# Patient Record
Sex: MALE | Race: WHITE | Employment: OTHER | RURAL
[De-identification: names, ages, dates, MRNs, and addresses within clinical notes are randomized per-mention and may not be internally consistent; named-entity substitution may affect disease eponyms.]

---

## 2017-01-17 ENCOUNTER — TELEPHONE (OUTPATIENT)
Dept: PRIMARY CARE CLINIC | Age: 62
End: 2017-01-17

## 2017-01-17 ENCOUNTER — HOSPITAL ENCOUNTER (OUTPATIENT)
Dept: GENERAL RADIOLOGY | Age: 62
Discharge: HOME | End: 2017-01-17
Attending: NURSE PRACTITIONER | Admitting: NURSE PRACTITIONER

## 2017-01-17 ENCOUNTER — OFFICE VISIT (OUTPATIENT)
Dept: PRIMARY CARE CLINIC | Age: 62
End: 2017-01-17
Payer: COMMERCIAL

## 2017-01-17 VITALS
HEART RATE: 85 BPM | BODY MASS INDEX: 47.47 KG/M2 | OXYGEN SATURATION: 96 % | WEIGHT: 315 LBS | DIASTOLIC BLOOD PRESSURE: 80 MMHG | SYSTOLIC BLOOD PRESSURE: 130 MMHG

## 2017-01-17 DIAGNOSIS — M25.562 PAIN IN LEFT KNEE: ICD-10-CM

## 2017-01-17 DIAGNOSIS — J06.9 UPPER RESPIRATORY TRACT INFECTION, UNSPECIFIED TYPE: ICD-10-CM

## 2017-01-17 DIAGNOSIS — M25.462 SWELLING OF LEFT KNEE JOINT: ICD-10-CM

## 2017-01-17 DIAGNOSIS — F41.9 ANXIETY: ICD-10-CM

## 2017-01-17 DIAGNOSIS — M25.562 LEFT KNEE PAIN, UNSPECIFIED CHRONICITY: ICD-10-CM

## 2017-01-17 DIAGNOSIS — M25.562 LEFT KNEE PAIN, UNSPECIFIED CHRONICITY: Primary | ICD-10-CM

## 2017-01-17 PROCEDURE — 99213 OFFICE O/P EST LOW 20 MIN: CPT | Performed by: NURSE PRACTITIONER

## 2017-01-17 RX ORDER — AZITHROMYCIN 250 MG/1
TABLET, FILM COATED ORAL
Qty: 1 PACKET | Refills: 0 | Status: SHIPPED | OUTPATIENT
Start: 2017-01-17 | End: 2017-01-24 | Stop reason: ALTCHOICE

## 2017-01-17 RX ORDER — ALPRAZOLAM 1 MG/1
1 TABLET ORAL 3 TIMES DAILY PRN
Qty: 90 TABLET | Refills: 0 | Status: SHIPPED | OUTPATIENT
Start: 2017-01-17 | End: 2017-02-17 | Stop reason: SDUPTHER

## 2017-01-17 ASSESSMENT — ENCOUNTER SYMPTOMS
GASTROINTESTINAL NEGATIVE: 1
EYES NEGATIVE: 1

## 2017-01-19 ENCOUNTER — TELEPHONE (OUTPATIENT)
Dept: PRIMARY CARE CLINIC | Age: 62
End: 2017-01-19

## 2017-01-20 ENCOUNTER — TELEPHONE (OUTPATIENT)
Dept: PRIMARY CARE CLINIC | Age: 62
End: 2017-01-20

## 2017-01-24 ENCOUNTER — OFFICE VISIT (OUTPATIENT)
Dept: PRIMARY CARE CLINIC | Age: 62
End: 2017-01-24
Payer: COMMERCIAL

## 2017-01-24 VITALS
HEIGHT: 72 IN | DIASTOLIC BLOOD PRESSURE: 80 MMHG | SYSTOLIC BLOOD PRESSURE: 142 MMHG | WEIGHT: 315 LBS | HEART RATE: 79 BPM | RESPIRATION RATE: 20 BRPM | BODY MASS INDEX: 42.66 KG/M2 | OXYGEN SATURATION: 97 %

## 2017-01-24 DIAGNOSIS — N28.9 RENAL INSUFFICIENCY: ICD-10-CM

## 2017-01-24 DIAGNOSIS — E11.9 TYPE 2 DIABETES MELLITUS WITHOUT COMPLICATION, WITH LONG-TERM CURRENT USE OF INSULIN (HCC): ICD-10-CM

## 2017-01-24 DIAGNOSIS — I10 ESSENTIAL HYPERTENSION: ICD-10-CM

## 2017-01-24 DIAGNOSIS — M25.562 LEFT KNEE PAIN, UNSPECIFIED CHRONICITY: Primary | ICD-10-CM

## 2017-01-24 DIAGNOSIS — Z79.4 TYPE 2 DIABETES MELLITUS WITHOUT COMPLICATION, WITH LONG-TERM CURRENT USE OF INSULIN (HCC): ICD-10-CM

## 2017-01-24 PROCEDURE — 99213 OFFICE O/P EST LOW 20 MIN: CPT | Performed by: NURSE PRACTITIONER

## 2017-01-24 RX ORDER — AMLODIPINE BESYLATE 5 MG/1
TABLET ORAL
Qty: 60 TABLET | Refills: 5 | Status: SHIPPED | OUTPATIENT
Start: 2017-01-24 | End: 2017-01-26 | Stop reason: SDUPTHER

## 2017-01-24 RX ORDER — GABAPENTIN 800 MG/1
TABLET ORAL
Refills: 0 | COMMUNITY
Start: 2016-12-30 | End: 2017-09-08 | Stop reason: ALTCHOICE

## 2017-01-24 ASSESSMENT — ENCOUNTER SYMPTOMS
ABDOMINAL PAIN: 0
VOMITING: 0
NAUSEA: 0
WHEEZING: 0
BACK PAIN: 0
SINUS PRESSURE: 0
EYE DISCHARGE: 0
COUGH: 0
SHORTNESS OF BREATH: 0

## 2017-01-26 RX ORDER — AMLODIPINE BESYLATE 10 MG/1
TABLET ORAL
Qty: 30 TABLET | Refills: 5 | Status: SHIPPED | OUTPATIENT
Start: 2017-01-26 | End: 2017-06-09 | Stop reason: SDUPTHER

## 2017-01-30 ASSESSMENT — ENCOUNTER SYMPTOMS
EYE PAIN: 0
COUGH: 1
ABDOMINAL PAIN: 0
SHORTNESS OF BREATH: 0
SORE THROAT: 0
NAUSEA: 0
VOMITING: 0
SINUS PRESSURE: 1

## 2017-02-03 RX ORDER — OMEPRAZOLE 40 MG/1
CAPSULE, DELAYED RELEASE ORAL
Qty: 30 CAPSULE | Refills: 5 | Status: SHIPPED | OUTPATIENT
Start: 2017-02-03 | End: 2017-11-02 | Stop reason: SDUPTHER

## 2017-02-17 RX ORDER — ALPRAZOLAM 1 MG/1
1 TABLET ORAL 3 TIMES DAILY PRN
Qty: 90 TABLET | Refills: 0 | Status: SHIPPED | OUTPATIENT
Start: 2017-02-17 | End: 2017-03-17 | Stop reason: SDUPTHER

## 2017-02-23 ENCOUNTER — TELEPHONE (OUTPATIENT)
Dept: PRIMARY CARE CLINIC | Age: 62
End: 2017-02-23

## 2017-03-02 DIAGNOSIS — E03.9 HYPOTHYROIDISM: ICD-10-CM

## 2017-03-02 RX ORDER — LEVOTHYROXINE SODIUM 0.07 MG/1
TABLET ORAL
Qty: 30 TABLET | Refills: 5 | Status: SHIPPED | OUTPATIENT
Start: 2017-03-02 | End: 2017-11-02 | Stop reason: SDUPTHER

## 2017-03-09 ENCOUNTER — HOSPITAL ENCOUNTER (OUTPATIENT)
Dept: OTHER | Age: 62
Discharge: HOME | End: 2017-03-09
Attending: NURSE PRACTITIONER | Admitting: NURSE PRACTITIONER

## 2017-03-09 ENCOUNTER — OFFICE VISIT (OUTPATIENT)
Dept: PRIMARY CARE CLINIC | Age: 62
End: 2017-03-09
Payer: COMMERCIAL

## 2017-03-09 VITALS
DIASTOLIC BLOOD PRESSURE: 80 MMHG | WEIGHT: 315 LBS | BODY MASS INDEX: 47.5 KG/M2 | SYSTOLIC BLOOD PRESSURE: 140 MMHG | OXYGEN SATURATION: 96 % | HEART RATE: 94 BPM

## 2017-03-09 DIAGNOSIS — M10.9 GOUT, UNSPECIFIED CAUSE, UNSPECIFIED CHRONICITY, UNSPECIFIED SITE: ICD-10-CM

## 2017-03-09 DIAGNOSIS — Z79.4 TYPE 2 DIABETES MELLITUS WITHOUT COMPLICATION, WITH LONG-TERM CURRENT USE OF INSULIN (HCC): ICD-10-CM

## 2017-03-09 DIAGNOSIS — E11.9 TYPE 2 DIABETES MELLITUS WITHOUT COMPLICATION, WITH LONG-TERM CURRENT USE OF INSULIN (HCC): ICD-10-CM

## 2017-03-09 DIAGNOSIS — M25.562 LEFT KNEE PAIN, UNSPECIFIED CHRONICITY: ICD-10-CM

## 2017-03-09 DIAGNOSIS — M10.9 GOUT, UNSPECIFIED CAUSE, UNSPECIFIED CHRONICITY, UNSPECIFIED SITE: Primary | ICD-10-CM

## 2017-03-09 DIAGNOSIS — I10 ESSENTIAL HYPERTENSION: ICD-10-CM

## 2017-03-09 LAB
A/G RATIO: 2 (ref 0.8–2)
ALBUMIN SERPL-MCNC: 4.5 G/DL (ref 3.4–4.8)
ALP BLD-CCNC: 120 U/L (ref 25–100)
ALT SERPL-CCNC: 19 U/L (ref 4–36)
ANION GAP SERPL CALCULATED.3IONS-SCNC: 11 MMOL/L (ref 3–16)
AST SERPL-CCNC: 17 U/L (ref 8–33)
BILIRUB SERPL-MCNC: 0.3 MG/DL (ref 0.3–1.2)
BUN BLDV-MCNC: 16 MG/DL (ref 6–20)
CALCIUM SERPL-MCNC: 9.3 MG/DL (ref 8.5–10.5)
CHLORIDE BLD-SCNC: 102 MMOL/L (ref 98–107)
CO2: 26 MMOL/L (ref 20–30)
CREAT SERPL-MCNC: 1.3 MG/DL (ref 0.4–1.2)
GFR AFRICAN AMERICAN: >59
GFR NON-AFRICAN AMERICAN: 56
GLOBULIN: 2.3 G/DL
GLUCOSE BLD-MCNC: 155 MG/DL (ref 74–106)
HBA1C MFR BLD: 8.5 %
POTASSIUM SERPL-SCNC: 4.6 MMOL/L (ref 3.4–5.1)
SODIUM BLD-SCNC: 139 MMOL/L (ref 136–145)
TOTAL PROTEIN: 6.8 G/DL (ref 6.4–8.3)
URIC ACID, SERUM: 5.5 MG/DL (ref 3.7–8.6)

## 2017-03-09 PROCEDURE — 99213 OFFICE O/P EST LOW 20 MIN: CPT | Performed by: NURSE PRACTITIONER

## 2017-03-09 RX ORDER — ALLOPURINOL 300 MG/1
300 TABLET ORAL DAILY
Qty: 30 TABLET | Refills: 5 | Status: CANCELLED | OUTPATIENT
Start: 2017-03-09 | End: 2018-03-09

## 2017-03-09 RX ORDER — LISINOPRIL 40 MG/1
40 TABLET ORAL DAILY
Qty: 30 TABLET | Refills: 5 | Status: SHIPPED | OUTPATIENT
Start: 2017-03-09 | End: 2017-11-02 | Stop reason: SDUPTHER

## 2017-03-09 ASSESSMENT — ENCOUNTER SYMPTOMS
RESPIRATORY NEGATIVE: 1
COUGH: 0
VOMITING: 0
GASTROINTESTINAL NEGATIVE: 1
SHORTNESS OF BREATH: 0
ABDOMINAL PAIN: 0
SORE THROAT: 0
NAUSEA: 0
EYES NEGATIVE: 1
EYE PAIN: 0

## 2017-03-10 ENCOUNTER — TELEPHONE (OUTPATIENT)
Dept: PRIMARY CARE CLINIC | Age: 62
End: 2017-03-10

## 2017-03-10 RX ORDER — ALLOPURINOL 100 MG/1
100 TABLET ORAL DAILY
Qty: 30 TABLET | Refills: 5 | Status: SHIPPED | OUTPATIENT
Start: 2017-03-10 | End: 2017-11-02 | Stop reason: SDUPTHER

## 2017-03-17 RX ORDER — ALPRAZOLAM 1 MG/1
1 TABLET ORAL 3 TIMES DAILY PRN
Qty: 90 TABLET | Refills: 0 | Status: SHIPPED | OUTPATIENT
Start: 2017-03-17 | End: 2017-04-18 | Stop reason: SDUPTHER

## 2017-04-01 ENCOUNTER — OFFICE VISIT (OUTPATIENT)
Dept: PRIMARY CARE CLINIC | Age: 62
End: 2017-04-01
Payer: COMMERCIAL

## 2017-04-01 VITALS
BODY MASS INDEX: 47.47 KG/M2 | WEIGHT: 315 LBS | SYSTOLIC BLOOD PRESSURE: 110 MMHG | HEART RATE: 88 BPM | OXYGEN SATURATION: 97 % | DIASTOLIC BLOOD PRESSURE: 80 MMHG | TEMPERATURE: 97.6 F

## 2017-04-01 DIAGNOSIS — R50.81 FEVER IN OTHER DISEASES: ICD-10-CM

## 2017-04-01 DIAGNOSIS — R05.9 COUGH: Primary | ICD-10-CM

## 2017-04-01 DIAGNOSIS — R52 BODY ACHES: ICD-10-CM

## 2017-04-01 DIAGNOSIS — J06.9 UPPER RESPIRATORY TRACT INFECTION, UNSPECIFIED TYPE: ICD-10-CM

## 2017-04-01 LAB
INFLUENZA A ANTIBODY: NORMAL
INFLUENZA B ANTIBODY: NORMAL

## 2017-04-01 PROCEDURE — 99213 OFFICE O/P EST LOW 20 MIN: CPT | Performed by: NURSE PRACTITIONER

## 2017-04-01 PROCEDURE — 87804 INFLUENZA ASSAY W/OPTIC: CPT | Performed by: NURSE PRACTITIONER

## 2017-04-01 RX ORDER — AZITHROMYCIN 250 MG/1
TABLET, FILM COATED ORAL
Qty: 1 PACKET | Refills: 0 | Status: SHIPPED | OUTPATIENT
Start: 2017-04-01 | End: 2017-05-16 | Stop reason: ALTCHOICE

## 2017-04-01 RX ORDER — BROMPHENIRAMINE MALEATE, PSEUDOEPHEDRINE HYDROCHLORIDE, AND DEXTROMETHORPHAN HYDROBROMIDE 2; 30; 10 MG/5ML; MG/5ML; MG/5ML
SYRUP ORAL
Qty: 120 ML | Refills: 1 | Status: SHIPPED | OUTPATIENT
Start: 2017-04-01 | End: 2017-04-01 | Stop reason: CLARIF

## 2017-04-01 RX ORDER — BROMPHENIRAMINE MALEATE, PSEUDOEPHEDRINE HYDROCHLORIDE, AND DEXTROMETHORPHAN HYDROBROMIDE 2; 30; 10 MG/5ML; MG/5ML; MG/5ML
SYRUP ORAL
Qty: 120 ML | Refills: 1 | Status: SHIPPED | OUTPATIENT
Start: 2017-04-01 | End: 2017-06-09 | Stop reason: ALTCHOICE

## 2017-04-01 RX ORDER — AZITHROMYCIN 250 MG/1
TABLET, FILM COATED ORAL
Qty: 1 PACKET | Refills: 0 | Status: SHIPPED | OUTPATIENT
Start: 2017-04-01 | End: 2017-04-01 | Stop reason: CLARIF

## 2017-04-01 ASSESSMENT — ENCOUNTER SYMPTOMS
EYES NEGATIVE: 1
COUGH: 1
GASTROINTESTINAL NEGATIVE: 1

## 2017-04-18 RX ORDER — ALPRAZOLAM 1 MG/1
1 TABLET ORAL 3 TIMES DAILY PRN
Qty: 90 TABLET | Refills: 0 | Status: SHIPPED | OUTPATIENT
Start: 2017-04-18 | End: 2017-05-18 | Stop reason: SDUPTHER

## 2017-05-18 RX ORDER — ALPRAZOLAM 1 MG/1
1 TABLET ORAL 3 TIMES DAILY PRN
Qty: 90 TABLET | Refills: 0 | Status: SHIPPED | OUTPATIENT
Start: 2017-05-18 | End: 2017-06-13 | Stop reason: SDUPTHER

## 2017-06-09 ENCOUNTER — OFFICE VISIT (OUTPATIENT)
Dept: PRIMARY CARE CLINIC | Age: 62
End: 2017-06-09
Payer: COMMERCIAL

## 2017-06-09 ENCOUNTER — HOSPITAL ENCOUNTER (OUTPATIENT)
Dept: OTHER | Age: 62
Discharge: HOME | End: 2017-06-09
Attending: NURSE PRACTITIONER | Admitting: NURSE PRACTITIONER

## 2017-06-09 VITALS
OXYGEN SATURATION: 97 % | DIASTOLIC BLOOD PRESSURE: 70 MMHG | BODY MASS INDEX: 45.62 KG/M2 | WEIGHT: 315 LBS | SYSTOLIC BLOOD PRESSURE: 120 MMHG | HEART RATE: 89 BPM

## 2017-06-09 DIAGNOSIS — Z12.11 SCREENING FOR COLON CANCER: ICD-10-CM

## 2017-06-09 DIAGNOSIS — I10 ESSENTIAL HYPERTENSION: ICD-10-CM

## 2017-06-09 DIAGNOSIS — E11.9 TYPE 2 DIABETES MELLITUS WITHOUT COMPLICATION, WITH LONG-TERM CURRENT USE OF INSULIN (HCC): ICD-10-CM

## 2017-06-09 DIAGNOSIS — E78.00 HYPERCHOLESTEREMIA: ICD-10-CM

## 2017-06-09 DIAGNOSIS — Z79.4 TYPE 2 DIABETES MELLITUS WITHOUT COMPLICATION, WITH LONG-TERM CURRENT USE OF INSULIN (HCC): ICD-10-CM

## 2017-06-09 DIAGNOSIS — I10 ESSENTIAL HYPERTENSION: Primary | ICD-10-CM

## 2017-06-09 DIAGNOSIS — F41.9 ANXIETY: ICD-10-CM

## 2017-06-09 LAB
A/G RATIO: 1.9 (ref 0.8–2)
ALBUMIN SERPL-MCNC: 4.5 G/DL (ref 3.4–4.8)
ALP BLD-CCNC: 105 U/L (ref 25–100)
ALT SERPL-CCNC: 24 U/L (ref 4–36)
ANION GAP SERPL CALCULATED.3IONS-SCNC: 15 MMOL/L (ref 3–16)
AST SERPL-CCNC: 19 U/L (ref 8–33)
BILIRUB SERPL-MCNC: 0.3 MG/DL (ref 0.3–1.2)
BUN BLDV-MCNC: 15 MG/DL (ref 6–20)
CALCIUM SERPL-MCNC: 9.4 MG/DL (ref 8.5–10.5)
CHLORIDE BLD-SCNC: 103 MMOL/L (ref 98–107)
CHOLESTEROL, TOTAL: 160 MG/DL (ref 0–200)
CO2: 23 MMOL/L (ref 20–30)
CREAT SERPL-MCNC: 1.2 MG/DL (ref 0.4–1.2)
GFR AFRICAN AMERICAN: >59
GFR NON-AFRICAN AMERICAN: >59
GLOBULIN: 2.4 G/DL
GLUCOSE BLD-MCNC: 168 MG/DL (ref 74–106)
HBA1C MFR BLD: 7.7 %
HDLC SERPL-MCNC: 33 MG/DL (ref 40–60)
LDL CHOLESTEROL CALCULATED: 101 MG/DL
POTASSIUM SERPL-SCNC: 4.7 MMOL/L (ref 3.4–5.1)
SODIUM BLD-SCNC: 141 MMOL/L (ref 136–145)
TOTAL PROTEIN: 6.9 G/DL (ref 6.4–8.3)
TRIGL SERPL-MCNC: 129 MG/DL (ref 0–249)
VLDLC SERPL CALC-MCNC: 26 MG/DL

## 2017-06-09 PROCEDURE — 99213 OFFICE O/P EST LOW 20 MIN: CPT | Performed by: NURSE PRACTITIONER

## 2017-06-09 RX ORDER — AMLODIPINE BESYLATE 10 MG/1
TABLET ORAL
Qty: 30 TABLET | Refills: 5 | Status: SHIPPED | OUTPATIENT
Start: 2017-06-09 | End: 2017-12-05 | Stop reason: SDUPTHER

## 2017-06-09 RX ORDER — PRAVASTATIN SODIUM 40 MG
TABLET ORAL
Qty: 30 TABLET | Refills: 5 | Status: SHIPPED | OUTPATIENT
Start: 2017-06-09 | End: 2017-12-05 | Stop reason: SDUPTHER

## 2017-06-09 RX ORDER — GLIPIZIDE 10 MG/1
TABLET ORAL
Qty: 120 TABLET | Refills: 5 | Status: SHIPPED | OUTPATIENT
Start: 2017-06-09 | End: 2017-12-05 | Stop reason: SDUPTHER

## 2017-06-09 ASSESSMENT — PATIENT HEALTH QUESTIONNAIRE - PHQ9
2. FEELING DOWN, DEPRESSED OR HOPELESS: 0
SUM OF ALL RESPONSES TO PHQ9 QUESTIONS 1 & 2: 0
1. LITTLE INTEREST OR PLEASURE IN DOING THINGS: 0
SUM OF ALL RESPONSES TO PHQ QUESTIONS 1-9: 0

## 2017-06-09 ASSESSMENT — ENCOUNTER SYMPTOMS
EYE PAIN: 0
VOMITING: 0
ABDOMINAL PAIN: 0
SORE THROAT: 0
NAUSEA: 0
COUGH: 0
SHORTNESS OF BREATH: 0

## 2017-06-13 ENCOUNTER — TELEPHONE (OUTPATIENT)
Dept: PRIMARY CARE CLINIC | Age: 62
End: 2017-06-13

## 2017-06-13 RX ORDER — ALPRAZOLAM 1 MG/1
1 TABLET ORAL 3 TIMES DAILY PRN
Qty: 90 TABLET | Refills: 0 | Status: SHIPPED | OUTPATIENT
Start: 2017-06-13 | End: 2017-07-18 | Stop reason: SDUPTHER

## 2017-06-13 RX ORDER — INSULIN GLARGINE 100 [IU]/ML
60 INJECTION, SOLUTION SUBCUTANEOUS NIGHTLY
Qty: 10 ML | Refills: 5
Start: 2017-06-13 | End: 2017-12-05 | Stop reason: SDUPTHER

## 2017-06-25 ASSESSMENT — ENCOUNTER SYMPTOMS: BACK PAIN: 1

## 2017-06-28 ENCOUNTER — OFFICE VISIT (OUTPATIENT)
Dept: PRIMARY CARE CLINIC | Age: 62
End: 2017-06-28
Payer: COMMERCIAL

## 2017-06-28 VITALS
OXYGEN SATURATION: 94 % | TEMPERATURE: 100.7 F | DIASTOLIC BLOOD PRESSURE: 80 MMHG | HEART RATE: 114 BPM | SYSTOLIC BLOOD PRESSURE: 140 MMHG

## 2017-06-28 DIAGNOSIS — R53.1 WEAKNESS: ICD-10-CM

## 2017-06-28 DIAGNOSIS — R50.9 FEVER, UNSPECIFIED FEVER CAUSE: ICD-10-CM

## 2017-06-28 DIAGNOSIS — R41.0 CONFUSION: Primary | ICD-10-CM

## 2017-06-28 DIAGNOSIS — R41.82 ALTERED MENTAL STATUS, UNSPECIFIED ALTERED MENTAL STATUS TYPE: ICD-10-CM

## 2017-06-28 LAB
BILIRUBIN, POC: ABNORMAL
BLOOD URINE, POC: ABNORMAL
CLARITY, POC: CLEAR
COLOR, POC: ABNORMAL
GLUCOSE URINE, POC: ABNORMAL
KETONES, POC: ABNORMAL
LEUKOCYTE EST, POC: ABNORMAL
NITRITE, POC: ABNORMAL
PH, POC: 6
PROTEIN, POC: ABNORMAL
SPECIFIC GRAVITY, POC: 1.01
UROBILINOGEN, POC: 0.2

## 2017-06-28 PROCEDURE — 99213 OFFICE O/P EST LOW 20 MIN: CPT | Performed by: NURSE PRACTITIONER

## 2017-06-28 PROCEDURE — 81002 URINALYSIS NONAUTO W/O SCOPE: CPT | Performed by: NURSE PRACTITIONER

## 2017-06-28 ASSESSMENT — ENCOUNTER SYMPTOMS
NAUSEA: 0
COUGH: 0
VOMITING: 0
EYE PAIN: 0
SHORTNESS OF BREATH: 0
ABDOMINAL PAIN: 0
SORE THROAT: 0

## 2017-06-29 PROBLEM — E11.9 TYPE 2 DIABETES MELLITUS (HCC): Status: ACTIVE | Noted: 2017-06-29

## 2017-06-29 PROBLEM — N18.30 CHRONIC KIDNEY DISEASE, STAGE III (MODERATE) (HCC): Status: ACTIVE | Noted: 2017-06-29

## 2017-06-29 PROBLEM — R41.82 MENTAL STATUS CHANGE: Status: ACTIVE | Noted: 2017-06-29

## 2017-06-29 PROBLEM — D72.829 LEUKOCYTOSIS: Status: ACTIVE | Noted: 2017-06-29

## 2017-06-29 PROBLEM — R50.9 FEVER: Status: ACTIVE | Noted: 2017-06-29

## 2017-07-05 ENCOUNTER — CARE COORDINATION (OUTPATIENT)
Dept: CARE COORDINATION | Age: 62
End: 2017-07-05

## 2017-07-12 ENCOUNTER — OFFICE VISIT (OUTPATIENT)
Dept: PRIMARY CARE CLINIC | Age: 62
End: 2017-07-12
Payer: COMMERCIAL

## 2017-07-12 ENCOUNTER — HOSPITAL ENCOUNTER (OUTPATIENT)
Dept: OTHER | Age: 62
Discharge: HOME | End: 2017-07-12
Attending: NURSE PRACTITIONER | Admitting: NURSE PRACTITIONER

## 2017-07-12 VITALS
WEIGHT: 315 LBS | DIASTOLIC BLOOD PRESSURE: 70 MMHG | OXYGEN SATURATION: 97 % | SYSTOLIC BLOOD PRESSURE: 130 MMHG | HEART RATE: 82 BPM | BODY MASS INDEX: 45.33 KG/M2

## 2017-07-12 DIAGNOSIS — J18.9 PNEUMONIA OF BOTH LOWER LOBES DUE TO INFECTIOUS ORGANISM: ICD-10-CM

## 2017-07-12 DIAGNOSIS — J18.9 PNEUMONIA OF BOTH LOWER LOBES DUE TO INFECTIOUS ORGANISM: Primary | ICD-10-CM

## 2017-07-12 DIAGNOSIS — N18.30 CHRONIC KIDNEY DISEASE, STAGE III (MODERATE) (HCC): ICD-10-CM

## 2017-07-12 LAB
A/G RATIO: 1.6 (ref 0.8–2)
ALBUMIN SERPL-MCNC: 4.7 G/DL (ref 3.4–4.8)
ALP BLD-CCNC: 128 U/L (ref 25–100)
ALT SERPL-CCNC: 40 U/L (ref 4–36)
ANION GAP SERPL CALCULATED.3IONS-SCNC: 15 MMOL/L (ref 3–16)
AST SERPL-CCNC: 19 U/L (ref 8–33)
BASOPHILS ABSOLUTE: 0.1 K/UL (ref 0–0.1)
BASOPHILS RELATIVE PERCENT: 0.9 %
BILIRUB SERPL-MCNC: 0.4 MG/DL (ref 0.3–1.2)
BUN BLDV-MCNC: 24 MG/DL (ref 6–20)
CALCIUM SERPL-MCNC: 10 MG/DL (ref 8.5–10.5)
CHLORIDE BLD-SCNC: 102 MMOL/L (ref 98–107)
CO2: 23 MMOL/L (ref 20–30)
CREAT SERPL-MCNC: 1.4 MG/DL (ref 0.4–1.2)
EOSINOPHILS ABSOLUTE: 0.7 K/UL (ref 0–0.4)
EOSINOPHILS RELATIVE PERCENT: 4.6 %
GFR AFRICAN AMERICAN: >59
GFR NON-AFRICAN AMERICAN: 51
GLOBULIN: 3 G/DL
GLUCOSE BLD-MCNC: 203 MG/DL (ref 74–106)
HCT VFR BLD CALC: 44.7 % (ref 40–54)
HEMOGLOBIN: 15.1 G/DL (ref 13–18)
LYMPHOCYTES ABSOLUTE: 4.3 K/UL (ref 1.5–4)
LYMPHOCYTES RELATIVE PERCENT: 28.3 % (ref 20–40)
MCH RBC QN AUTO: 29.2 PG (ref 27–32)
MCHC RBC AUTO-ENTMCNC: 33.8 G/DL (ref 31–35)
MCV RBC AUTO: 86.3 FL (ref 80–100)
MONOCYTES ABSOLUTE: 0.9 K/UL (ref 0.2–0.8)
MONOCYTES RELATIVE PERCENT: 6.1 % (ref 3–10)
NEUTROPHILS ABSOLUTE: 9.1 K/UL (ref 2–7.5)
NEUTROPHILS RELATIVE PERCENT: 60.1 %
PDW BLD-RTO: 13.2 % (ref 11–16)
PLATELET # BLD: 368 K/UL (ref 150–400)
PMV BLD AUTO: 10.1 FL (ref 6–10)
POTASSIUM SERPL-SCNC: 4.8 MMOL/L (ref 3.4–5.1)
RBC # BLD: 5.18 M/UL (ref 4.5–6)
SODIUM BLD-SCNC: 140 MMOL/L (ref 136–145)
TOTAL PROTEIN: 7.7 G/DL (ref 6.4–8.3)
WBC # BLD: 15 K/UL (ref 4–11)

## 2017-07-12 PROCEDURE — 99214 OFFICE O/P EST MOD 30 MIN: CPT | Performed by: NURSE PRACTITIONER

## 2017-07-12 ASSESSMENT — ENCOUNTER SYMPTOMS
SHORTNESS OF BREATH: 0
NAUSEA: 0
VOMITING: 0
EYE PAIN: 0
COUGH: 0
ABDOMINAL PAIN: 0
SORE THROAT: 0

## 2017-07-13 ENCOUNTER — TELEPHONE (OUTPATIENT)
Dept: PRIMARY CARE CLINIC | Age: 62
End: 2017-07-13

## 2017-07-13 DIAGNOSIS — D72.828 OTHER ELEVATED WHITE BLOOD CELL (WBC) COUNT: Primary | ICD-10-CM

## 2017-07-13 DIAGNOSIS — E11.22 TYPE 2 DIABETES MELLITUS WITH STAGE 3 CHRONIC KIDNEY DISEASE, WITHOUT LONG-TERM CURRENT USE OF INSULIN (HCC): ICD-10-CM

## 2017-07-13 DIAGNOSIS — N18.30 TYPE 2 DIABETES MELLITUS WITH STAGE 3 CHRONIC KIDNEY DISEASE, WITHOUT LONG-TERM CURRENT USE OF INSULIN (HCC): ICD-10-CM

## 2017-07-14 ENCOUNTER — NURSE ONLY (OUTPATIENT)
Dept: PRIMARY CARE CLINIC | Age: 62
End: 2017-07-14
Payer: COMMERCIAL

## 2017-07-14 ENCOUNTER — TELEPHONE (OUTPATIENT)
Dept: PRIMARY CARE CLINIC | Age: 62
End: 2017-07-14

## 2017-07-14 DIAGNOSIS — Z12.11 SCREENING FOR COLON CANCER: ICD-10-CM

## 2017-07-14 LAB
CONTROL: POSITIVE
HEMOCCULT STL QL: POSITIVE

## 2017-07-14 PROCEDURE — 82274 ASSAY TEST FOR BLOOD FECAL: CPT | Performed by: NURSE PRACTITIONER

## 2017-07-18 ENCOUNTER — HOSPITAL ENCOUNTER (OUTPATIENT)
Dept: OTHER | Age: 62
Discharge: HOME | End: 2017-07-18
Attending: NURSE PRACTITIONER | Admitting: NURSE PRACTITIONER

## 2017-07-19 RX ORDER — ALPRAZOLAM 1 MG/1
1 TABLET ORAL 3 TIMES DAILY PRN
Qty: 90 TABLET | Refills: 0 | Status: SHIPPED | OUTPATIENT
Start: 2017-07-19 | End: 2017-09-08 | Stop reason: ALTCHOICE

## 2017-09-08 ENCOUNTER — OFFICE VISIT (OUTPATIENT)
Dept: PRIMARY CARE CLINIC | Age: 62
End: 2017-09-08
Payer: COMMERCIAL

## 2017-09-08 VITALS
SYSTOLIC BLOOD PRESSURE: 164 MMHG | HEART RATE: 93 BPM | DIASTOLIC BLOOD PRESSURE: 90 MMHG | OXYGEN SATURATION: 98 % | BODY MASS INDEX: 44.77 KG/M2 | WEIGHT: 315 LBS

## 2017-09-08 DIAGNOSIS — R60.9 EDEMA, UNSPECIFIED TYPE: ICD-10-CM

## 2017-09-08 DIAGNOSIS — E11.9 TYPE 2 DIABETES MELLITUS WITHOUT COMPLICATION, WITHOUT LONG-TERM CURRENT USE OF INSULIN (HCC): Primary | ICD-10-CM

## 2017-09-08 DIAGNOSIS — G62.9 PERIPHERAL POLYNEUROPATHY: ICD-10-CM

## 2017-09-08 DIAGNOSIS — I10 ESSENTIAL HYPERTENSION: ICD-10-CM

## 2017-09-08 DIAGNOSIS — N18.30 CHRONIC KIDNEY DISEASE, STAGE III (MODERATE) (HCC): ICD-10-CM

## 2017-09-08 LAB — HBA1C MFR BLD: 8.6 %

## 2017-09-08 PROCEDURE — 83036 HEMOGLOBIN GLYCOSYLATED A1C: CPT | Performed by: NURSE PRACTITIONER

## 2017-09-08 PROCEDURE — 99214 OFFICE O/P EST MOD 30 MIN: CPT | Performed by: NURSE PRACTITIONER

## 2017-09-08 RX ORDER — CARVEDILOL 12.5 MG/1
12.5 TABLET ORAL 2 TIMES DAILY WITH MEALS
Qty: 60 TABLET | Refills: 5 | Status: SHIPPED | OUTPATIENT
Start: 2017-09-08 | End: 2017-09-13 | Stop reason: SDUPTHER

## 2017-09-08 RX ORDER — METOLAZONE 2.5 MG/1
2.5 TABLET ORAL EVERY OTHER DAY
Qty: 15 TABLET | Refills: 5 | Status: SHIPPED | OUTPATIENT
Start: 2017-09-08 | End: 2017-09-13 | Stop reason: SDUPTHER

## 2017-09-08 RX ORDER — AMITRIPTYLINE HYDROCHLORIDE 25 MG/1
25 TABLET, FILM COATED ORAL NIGHTLY
Qty: 30 TABLET | Refills: 5 | Status: SHIPPED | OUTPATIENT
Start: 2017-09-08 | End: 2017-09-13 | Stop reason: SDUPTHER

## 2017-09-08 ASSESSMENT — ENCOUNTER SYMPTOMS
EYE PAIN: 0
NAUSEA: 0
COUGH: 0
SORE THROAT: 0
VOMITING: 0
SHORTNESS OF BREATH: 0
ABDOMINAL PAIN: 0

## 2017-09-13 RX ORDER — METOLAZONE 2.5 MG/1
2.5 TABLET ORAL EVERY OTHER DAY
Qty: 15 TABLET | Refills: 5 | Status: SHIPPED | OUTPATIENT
Start: 2017-09-13 | End: 2019-05-07 | Stop reason: SDUPTHER

## 2017-09-13 RX ORDER — AMITRIPTYLINE HYDROCHLORIDE 25 MG/1
25 TABLET, FILM COATED ORAL NIGHTLY
Qty: 30 TABLET | Refills: 5 | Status: SHIPPED | OUTPATIENT
Start: 2017-09-13 | End: 2017-10-05 | Stop reason: SDUPTHER

## 2017-09-13 RX ORDER — CARVEDILOL 12.5 MG/1
12.5 TABLET ORAL 2 TIMES DAILY WITH MEALS
Qty: 60 TABLET | Refills: 5 | Status: SHIPPED | OUTPATIENT
Start: 2017-09-13 | End: 2019-08-08 | Stop reason: SDUPTHER

## 2017-10-05 ENCOUNTER — OFFICE VISIT (OUTPATIENT)
Dept: PRIMARY CARE CLINIC | Age: 62
End: 2017-10-05
Payer: COMMERCIAL

## 2017-10-05 ENCOUNTER — TELEPHONE (OUTPATIENT)
Dept: PRIMARY CARE CLINIC | Age: 62
End: 2017-10-05

## 2017-10-05 ENCOUNTER — HOSPITAL ENCOUNTER (OUTPATIENT)
Dept: OTHER | Age: 62
Discharge: HOME | End: 2017-10-05
Attending: NURSE PRACTITIONER | Admitting: NURSE PRACTITIONER

## 2017-10-05 VITALS
DIASTOLIC BLOOD PRESSURE: 70 MMHG | BODY MASS INDEX: 42.66 KG/M2 | HEART RATE: 62 BPM | OXYGEN SATURATION: 98 % | SYSTOLIC BLOOD PRESSURE: 124 MMHG | WEIGHT: 315 LBS | HEIGHT: 72 IN

## 2017-10-05 DIAGNOSIS — I10 ESSENTIAL HYPERTENSION: ICD-10-CM

## 2017-10-05 DIAGNOSIS — R79.89 ELEVATED SERUM CREATININE: ICD-10-CM

## 2017-10-05 DIAGNOSIS — R60.9 EDEMA, UNSPECIFIED TYPE: ICD-10-CM

## 2017-10-05 DIAGNOSIS — R79.89 ELEVATED SERUM CREATININE: Primary | ICD-10-CM

## 2017-10-05 LAB
ANION GAP SERPL CALCULATED.3IONS-SCNC: 13 MMOL/L (ref 3–16)
BUN BLDV-MCNC: 30 MG/DL (ref 6–20)
CALCIUM SERPL-MCNC: 9.5 MG/DL (ref 8.5–10.5)
CHLORIDE BLD-SCNC: 99 MMOL/L (ref 98–107)
CO2: 28 MMOL/L (ref 20–30)
CREAT SERPL-MCNC: 1.1 MG/DL (ref 0.4–1.2)
GFR AFRICAN AMERICAN: >59
GFR NON-AFRICAN AMERICAN: >59
GLUCOSE BLD-MCNC: 159 MG/DL (ref 74–106)
POTASSIUM SERPL-SCNC: 3.9 MMOL/L (ref 3.4–5.1)
SODIUM BLD-SCNC: 140 MMOL/L (ref 136–145)

## 2017-10-05 PROCEDURE — 99213 OFFICE O/P EST LOW 20 MIN: CPT | Performed by: NURSE PRACTITIONER

## 2017-10-05 RX ORDER — LEVOFLOXACIN 500 MG/1
500 TABLET, FILM COATED ORAL DAILY
Qty: 10 TABLET | Refills: 0 | Status: SHIPPED | OUTPATIENT
Start: 2017-10-05 | End: 2017-10-15

## 2017-10-05 RX ORDER — AMITRIPTYLINE HYDROCHLORIDE 50 MG/1
50-100 TABLET, FILM COATED ORAL NIGHTLY
Qty: 60 TABLET | Refills: 5 | Status: SHIPPED | OUTPATIENT
Start: 2017-10-05 | End: 2018-04-04 | Stop reason: SDUPTHER

## 2017-10-05 RX ORDER — MELOXICAM 7.5 MG/1
TABLET ORAL
Refills: 0 | COMMUNITY
Start: 2017-08-02 | End: 2019-05-07 | Stop reason: SDUPTHER

## 2017-10-05 ASSESSMENT — ENCOUNTER SYMPTOMS
SHORTNESS OF BREATH: 0
EYE ITCHING: 0
ABDOMINAL PAIN: 0
NAUSEA: 0
EYE DISCHARGE: 0
CONSTIPATION: 0
RHINORRHEA: 0
COUGH: 1
VOMITING: 0
EYE REDNESS: 0
DIARRHEA: 0
SORE THROAT: 0

## 2017-10-05 NOTE — TELEPHONE ENCOUNTER
----- Message from TANESHA Chinchilla sent at 10/5/2017  2:24 PM EDT -----  Please inform lab looks great.

## 2017-10-05 NOTE — PATIENT INSTRUCTIONS
dispose of used patches by folding them in half so that the sticky sides meet, and then flushing them down a toilet. They should not be placed in the household trash where children or pets can find them. · If you have any questions, ask your provider or pharmacist for more information. · Be sure to keep all appointments for provider visits or tests. We are committed to providing you with the best care possible. In order to help us achieve these goals please remember to bring all medications, herbal products, and over the counter supplements with you to each visit. If your provider has ordered testing for you, please be sure to follow up with our office if you have not received results within 7 days after the testing took place. *If you receive a survey after visiting one of our offices, please take time to share your experience concerning your physician office visit. These surveys are confidential and no health information about you is shared. We are eager to improve for you and we are counting on your feedback to help make that happen. ips to Help You Stop Smoking       Cigarette smoking is a preventable cause of death in the [place]. If you have thought about quitting but haven't been able to, here are some reasons why you should and some ways to do it. Here's Why   Quitting smoking now can decrease your risk of getting smoking-related illnesses like:   Heart disease   Stroke   Several types of cancer, including:   Lung   Mouth   Esophagus   Larynx   Bladder   Pancreas   Kidney   Chronic lung diseases:   Bronchitis   Emphysema   Asthma   Cataracts   Macular degeneration   Thyroid conditions   Hearing loss   Erectile dysfunction   Dementia   Osteoporosis   Here's How   Once you've decided to quit smoking, set your target quit date a few weeks away.  In the time leading up to your quit day, try some of these ideas offered by the [address] [place] [address] [place] to help you successfully quit smoking. For the best results, work with your doctor. Together, you can test your lung function and compare the results to those of a nonsmoking person. The results can be given to you as your lung age. Finding out your lung age right after having the test done may help you to stop smoking. Your doctor can also discuss with you all of your options and refer you to smoking-cessation support groups. You may wish to use nicotine replacement (gum, patches, inhaler) or one of the prescription medications that have been shown to increase quit rates and prolong abstinence from smoking. But whatever you and your doctor decide on these matters, it will still be you who decides when an how to quit. Here are some techniques:   Switch Brands   Switch to a brand you find distasteful. Change to a brand that is low in tar and nicotine a couple of weeks before your target quit date. This will help change your smoking behavior. However, do not smoke more cigarettes, inhale them more often or more deeply, or place your fingertips over the holes in the filters. All of these actions will increase your nicotine intake, and the idea is to get your body used to functioning without nicotine. Cut Down the Number of Cigarettes You Smoke   Smoke only half of each cigarette. Each day, postpone the lighting of your first cigarette by one hour. Decide you'll only smoke during odd or even hours of the day. Decide beforehand how many cigarettes you'll smoke during the day. For each additional cigarette, give a dollar to your favorite cem. Change your eating habits to help you cut down. For example, drink milk, which many people consider incompatible with smoking. End meals or snacks with something that won't lead to a cigarette. Reach for a glass of juice instead of a cigarette for a \"pick-me-up. \"   Remember: Cutting down can help you quit, but it's not a substitute for quitting.  If yourself or someone else. Estimate the cost in terms of packs of cigarettes, and put the money aside to buy these presents. Keep very busy on the big day. Go to the movies, exercise, take long walks, or go bike riding. Remind your family and friends that this is your quit date, and ask them to help you over the rough spots of the first couple of days and weeks. Buy yourself a treat or do something special to celebrate. Telephone and Internet Support   Telephone, web-, and computer-based programs can offer you the support that you need to quit and to stay smoke-free. You can find many programs online, like the American [employer] Association's [place] from Smoking . Immediately After Quitting   Develop a clean, fresh, nonsmoking environment around yourselfat work and at home. Buy yourself flowersyou may be surprised how much you can enjoy their scent now. The first few days after you quit, spend as much free time as possible in places where smoking isn't allowed, such as [address] [place] [address], museums, theaters, department stores, and churches. Drink large quantities of water and fruit juice (but avoid sodas that contain caffeine). Try to avoid alcohol, coffee, and other beverages that you associate with cigarette smoking. Strike up conversation instead of a match for a cigarette. If you miss the sensation of having a cigarette in your hand, play with something elsea pencil, a paper clip, a marble. If you miss having something in your mouth, try toothpicks or a fake cigarette.

## 2017-10-05 NOTE — MR AVS SNAPSHOT
people who are more muscular. Even a small weight loss (between 5 and 10 percent of your current weight) by decreasing your calorie intake and becoming more physically active will help lower your risk of developing or worsening diseases associated with obesity. Learn more at: [employer]          Instructions    · Keep a list of your medicines with you. List all of the prescription medicines, nonprescription medicines, supplements, natural remedies, and vitamins that you take. Tell your healthcare providers who treat you about all of the products you are taking. Your provider can provide you with a form to keep track of them. Just ask. · Follow the directions that come with your medicine, including information about food or alcohol. Make sure you know how and when to take your medicine. Do not take more or less than you are supposed to take. · Keep all medicines out of the reach of children. · Store medicines according to the directions on the label. · Monitor yourself. Learn to know how your body reacts to your new medicine and keep track of how it makes you feel before attempting (If your provider has allowed you to do so) to drive or go to work. · Seek emergency medical attention if you think you have used too much of this medicine. An overdose of any prescription medicine can be fatal. Overdose symptoms may include extreme drowsiness, muscle weakness, confusion, cold and clammy skin, pinpoint pupils, shallow breathing, slow heart rate, fainting, or coma. · Don't share prescription medicines with others, even when they seem to have the same symptoms. What may be good for you may be harmful to others. · If you are no longer taking a prescribed medication and you have pills left please take your pills out of their original containers.  Mix crushed pills with an undesirable substance, such as cat litter or used coffee grounds. Put the mixture into a disposable container with a lid, such as an empty margarine tub, or into a sealable bag. Cover up or remove any of your personal information on the empty containers by covering it with black permanent marker or duct tape. Place the sealed container with the mixture, and the empty drug containers, in the trash. · If you use a medication that is in the form of a patch, dispose of used patches by folding them in half so that the sticky sides meet, and then flushing them down a toilet. They should not be placed in the household trash where children or pets can find them. · If you have any questions, ask your provider or pharmacist for more information. · Be sure to keep all appointments for provider visits or tests. We are committed to providing you with the best care possible. In order to help us achieve these goals please remember to bring all medications, herbal products, and over the counter supplements with you to each visit. If your provider has ordered testing for you, please be sure to follow up with our office if you have not received results within 7 days after the testing took place. *If you receive a survey after visiting one of our offices, please take time to share your experience concerning your physician office visit. These surveys are confidential and no health information about you is shared. We are eager to improve for you and we are counting on your feedback to help make that happen. ips to Help You Stop Smoking       Cigarette smoking is a preventable cause of death in the [place]. If you have thought about quitting but haven't been able to, here are some reasons why you should and some ways to do it.    Here's Why   Quitting smoking now can decrease your risk of getting smoking-related illnesses like:   Heart disease   Stroke   Several types of cancer, including:   Lung   Mouth   Esophagus   Larynx   Bladder   Pancreas   Kidney

## 2017-10-10 NOTE — PROGRESS NOTES
SUBJECTIVE:    Patient ID: Tammy Stratton is a 57 y.o. male. Medical history Review  Past Medical, Family, and Social History reviewed and does not contribute to the patient presenting condition    Health Maintenance Due   Topic Date Due    Hepatitis C screen  1955    Diabetic foot exam  04/03/1963    Diabetic retinal exam  04/03/1963    HIV screen  04/03/1968    DTaP/Tdap/Td vaccine (1 - Tdap) 04/03/1972    Pneumococcal med risk (1 of 1 - PPSV23) 04/03/1972    Flu vaccine (1) 09/01/2017        HPI:   Chief Complaint   Patient presents with    Diabetes     1 month follow up       Patient's medications, allergies, past medical, surgical, social and family histories were reviewed and updated as appropriate. Review of Systems Reviewed and acurate. See MA note. OBJECTIVE:  /70 (Site: Left Arm, Position: Sitting, Cuff Size: Large Adult)   Pulse 62   Ht 6' (1.829 m)   Wt (!) 315 lb (142.9 kg)   SpO2 98%   BMI 42.72 kg/m²    Physical Exam   Constitutional: He is oriented to person, place, and time. He appears well-developed and well-nourished. No distress. HENT:   Head: Normocephalic. Right Ear: Tympanic membrane normal.   Left Ear: Tympanic membrane normal.   Mouth/Throat: No oropharyngeal exudate. Eyes: Lids are normal.   Neck: Neck supple. Cardiovascular: Normal rate, regular rhythm and normal heart sounds. Pulmonary/Chest: Effort normal and breath sounds normal.   Abdominal: Soft. Bowel sounds are normal. He exhibits no distension. There is no tenderness. Musculoskeletal: He exhibits no edema. Lymphadenopathy:     He has no cervical adenopathy. Neurological: He is alert and oriented to person, place, and time. Skin: Skin is warm and dry. Psychiatric: He has a normal mood and affect. Vitals reviewed.       Results in Past 30 Days  Result Component Current Result Ref Range Previous Result Ref Range   BUN 30 (H) (10/5/2017) 6 - 20 mg/dL Not in Time Range Calcium 9.5 (10/5/2017) 8.5 - 10.5 mg/dL Not in Time Range    Chloride 99 (10/5/2017) 98 - 107 mmol/L Not in Time Range    CO2 28 (10/5/2017) 20 - 30 mmol/L Not in Time Range    CREATININE 1.1 (10/5/2017) 0.4 - 1.2 mg/dL Not in Time Range    GFR  >59 (10/5/2017) >59 Not in Time Range    GFR Non- >59 (10/5/2017) >59 Not in Time Range    Glucose 159 (H) (10/5/2017) 74 - 106 mg/dL Not in Time Range    Potassium 3.9 (10/5/2017) 3.4 - 5.1 mmol/L Not in Time Range    Sodium 140 (10/5/2017) 136 - 145 mmol/L Not in Time Range        Hemoglobin A1C (%)   Date Value   09/08/2017 8.6     Microscopic Examination (no units)   Date Value   06/28/2017 YES     LDL Calculated (mg/dL)   Date Value   06/09/2017 101         Lab Results   Component Value Date    WBC 15.0 07/12/2017    NEUTROABS 9.1 07/12/2017    HGB 15.1 07/12/2017    HCT 44.7 07/12/2017    MCV 86.3 07/12/2017     07/12/2017       Lab Results   Component Value Date    TSH 1.98 09/09/2016       Prior to Visit Medications    Medication Sig Taking?  Authorizing Provider   meloxicam (MOBIC) 7.5 MG tablet  Yes Historical Provider, MD   levofloxacin (LEVAQUIN) 500 MG tablet Take 1 tablet by mouth daily for 10 days Yes TANESHA Horn   amitriptyline (ELAVIL) 50 MG tablet Take 1-2 tablets by mouth nightly For sleep Yes TANESHA Horn   metolazone (ZAROXOLYN) 2.5 MG tablet Take 1 tablet by mouth every other day For swelling Yes TANESHA Horn   carvedilol (COREG) 12.5 MG tablet Take 1 tablet by mouth 2 times daily (with meals) Yes TANESHA Horn   Needles & Syringes MISC 1 each by Does not apply route daily u-100 insulin syringe Yes TANESHA Horn   LANTUS 100 UNIT/ML injection vial Inject 50 Units into the skin nightly Yes TANESHA Horn   FREESTYLE LANCETS MISC TEST TWO TIMES A DAY Yes TANESHA Horn   FREESTYLE LITE strip TEST BLOOD SUGAR TWO TIMES A DAY Yes TANESHA Horn   insulin glargine (LANTUS) 100 UNIT/ML injection vial Inject 60 Units into the skin nightly Yes TANESHA Webb   amLODIPine (NORVASC) 10 MG tablet TAKE 1 TABLET BY MOUTH Once a day Yes TANESHA Webb   saxagliptin (ONGLYZA) 5 MG TABS tablet Take 1 tablet by mouth daily For sugar Yes TANESHA Webb   pravastatin (PRAVACHOL) 40 MG tablet TAKE 1 TABLET BY MOUTH EVERY EVENING Yes TANESHA Webb   glipiZIDE (GLUCOTROL) 10 MG tablet TAKE 2 TABLETS BY MOUTH TWO TIMES A DAY Yes TANESHA Webb   allopurinol (ZYLOPRIM) 100 MG tablet Take 1 tablet by mouth daily Yes TANESHA Webb   lisinopril (PRINIVIL;ZESTRIL) 40 MG tablet Take 1 tablet by mouth daily Dosage increased Yes TANESHA Webb   levothyroxine (SYNTHROID) 75 MCG tablet TAKE 1 TABLET BY MOUTH ONCE DAILY. Yes TANESHA Webb   omeprazole (PRILOSEC) 40 MG delayed release capsule Take 1 capsule by mouth daily. Yes TANESHA Webb   diclofenac sodium 1 % GEL Apply 4 g topically 4 times daily Yes TANESHA Webb   Respiratory Therapy Supplies PREMA by Does not apply route. Yes Ru Miller MD   Blood Glucose Monitoring Suppl (FREESTYLE LITE) PREMA by Does not apply route. Yes Shasta Calderon MD   glucose blood VI test strips (FREESTYLE LITE) strip As needed. Dx: DM2  TANESHA Webb       ASSESSMENT:  1. Elevated serum creatinine    2. Essential hypertension    3. Edema, unspecified type          PLAN:    Orders Placed This Encounter   Medications    levofloxacin (LEVAQUIN) 500 MG tablet     Sig: Take 1 tablet by mouth daily for 10 days     Dispense:  10 tablet     Refill:  0    amitriptyline (ELAVIL) 50 MG tablet     Sig: Take 1-2 tablets by mouth nightly For sleep     Dispense:  60 tablet     Refill:  5     Orders Placed This Encounter   Procedures    BASIC METABOLIC PANEL     Patient Instructions   · Keep a list of your medicines with you.  List all of the prescription medicines, nonprescription medicines, toilet. They should not be placed in the household trash where children or pets can find them. · If you have any questions, ask your provider or pharmacist for more information. · Be sure to keep all appointments for provider visits or tests. We are committed to providing you with the best care possible. In order to help us achieve these goals please remember to bring all medications, herbal products, and over the counter supplements with you to each visit. If your provider has ordered testing for you, please be sure to follow up with our office if you have not received results within 7 days after the testing took place. *If you receive a survey after visiting one of our offices, please take time to share your experience concerning your physician office visit. These surveys are confidential and no health information about you is shared. We are eager to improve for you and we are counting on your feedback to help make that happen. ips to Help You Stop Smoking       Cigarette smoking is a preventable cause of death in the [place]. If you have thought about quitting but haven't been able to, here are some reasons why you should and some ways to do it. Here's Why   Quitting smoking now can decrease your risk of getting smoking-related illnesses like:   Heart disease   Stroke   Several types of cancer, including:   Lung   Mouth   Esophagus   Larynx   Bladder   Pancreas   Kidney   Chronic lung diseases:   Bronchitis   Emphysema   Asthma   Cataracts   Macular degeneration   Thyroid conditions   Hearing loss   Erectile dysfunction   Dementia   Osteoporosis   Here's How   Once you've decided to quit smoking, set your target quit date a few weeks away. In the time leading up to your quit day, try some of these ideas offered by the [address] to help you successfully quit smoking. For the best results, work with your doctor.  Together, you can presents. Keep very busy on the big day. Go to the movies, exercise, take long walks, or go bike riding. Remind your family and friends that this is your quit date, and ask them to help you over the rough spots of the first couple of days and weeks. Buy yourself a treat or do something special to celebrate. Telephone and Internet Support   Telephone, web-, and computer-based programs can offer you the support that you need to quit and to stay smoke-free. You can find many programs online, like the American [employer] Association's [place] from Smoking . Immediately After Quitting   Develop a clean, fresh, nonsmoking environment around yourselfat work and at home. Buy yourself flowersyou may be surprised how much you can enjoy their scent now. The first few days after you quit, spend as much free time as possible in places where smoking isn't allowed, such as [address] [place] [address], museums, theaters, department stores, and churches. Drink large quantities of water and fruit juice (but avoid sodas that contain caffeine). Try to avoid alcohol, coffee, and other beverages that you associate with cigarette smoking. Strike up conversation instead of a match for a cigarette. If you miss the sensation of having a cigarette in your hand, play with something elsea pencil, a paper clip, a marble. If you miss having something in your mouth, try toothpicks or a fake cigarette. Return in about 2 months (around 12/5/2017).

## 2017-12-05 ENCOUNTER — OFFICE VISIT (OUTPATIENT)
Dept: PRIMARY CARE CLINIC | Age: 62
End: 2017-12-05
Payer: COMMERCIAL

## 2017-12-05 VITALS
WEIGHT: 315 LBS | BODY MASS INDEX: 43.67 KG/M2 | OXYGEN SATURATION: 98 % | HEART RATE: 99 BPM | SYSTOLIC BLOOD PRESSURE: 150 MMHG | DIASTOLIC BLOOD PRESSURE: 100 MMHG

## 2017-12-05 DIAGNOSIS — R07.9 CHEST PAIN, UNSPECIFIED TYPE: ICD-10-CM

## 2017-12-05 DIAGNOSIS — I10 ESSENTIAL HYPERTENSION: ICD-10-CM

## 2017-12-05 DIAGNOSIS — R05.9 COUGH: ICD-10-CM

## 2017-12-05 DIAGNOSIS — N18.30 CHRONIC KIDNEY DISEASE, STAGE III (MODERATE) (HCC): ICD-10-CM

## 2017-12-05 DIAGNOSIS — E03.9 HYPOTHYROIDISM, UNSPECIFIED TYPE: ICD-10-CM

## 2017-12-05 DIAGNOSIS — E11.9 TYPE 2 DIABETES MELLITUS WITHOUT COMPLICATION, UNSPECIFIED LONG TERM INSULIN USE STATUS: Primary | ICD-10-CM

## 2017-12-05 LAB — HBA1C MFR BLD: 9 %

## 2017-12-05 PROCEDURE — 99214 OFFICE O/P EST MOD 30 MIN: CPT | Performed by: NURSE PRACTITIONER

## 2017-12-05 PROCEDURE — 83036 HEMOGLOBIN GLYCOSYLATED A1C: CPT | Performed by: NURSE PRACTITIONER

## 2017-12-05 RX ORDER — PRAVASTATIN SODIUM 40 MG
TABLET ORAL
Qty: 30 TABLET | Refills: 5 | Status: SHIPPED | OUTPATIENT
Start: 2017-12-05 | End: 2018-03-08 | Stop reason: SDUPTHER

## 2017-12-05 RX ORDER — AMLODIPINE BESYLATE 10 MG/1
TABLET ORAL
Qty: 30 TABLET | Refills: 5 | Status: SHIPPED | OUTPATIENT
Start: 2017-12-05 | End: 2018-03-06 | Stop reason: ALTCHOICE

## 2017-12-05 RX ORDER — INSULIN GLARGINE 100 [IU]/ML
65 INJECTION, SOLUTION SUBCUTANEOUS NIGHTLY
Qty: 10 ML | Refills: 5 | Status: SHIPPED | OUTPATIENT
Start: 2017-12-05 | End: 2018-03-08 | Stop reason: SDUPTHER

## 2017-12-05 RX ORDER — GLIPIZIDE 10 MG/1
TABLET ORAL
Qty: 120 TABLET | Refills: 5 | Status: SHIPPED | OUTPATIENT
Start: 2017-12-05 | End: 2019-05-07 | Stop reason: SDUPTHER

## 2017-12-05 ASSESSMENT — ENCOUNTER SYMPTOMS
SORE THROAT: 0
VOMITING: 0
COUGH: 1
SHORTNESS OF BREATH: 0
NAUSEA: 0
EYE PAIN: 0
ABDOMINAL PAIN: 0

## 2017-12-05 NOTE — PROGRESS NOTES
SUBJECTIVE:    Patient ID: Kg Moseley is a 57 y.o. male. Medical history Review  Past Medical, Family, and Social History reviewed and does contribute to the patient presenting condition    Health Maintenance Due   Topic Date Due    Hepatitis C screen  1955    Diabetic foot exam  04/03/1963    Diabetic retinal exam  04/03/1963    HIV screen  04/03/1968    DTaP/Tdap/Td vaccine (1 - Tdap) 04/03/1972    Pneumococcal med risk (1 of 1 - PPSV23) 04/03/1972    Flu vaccine (1) 09/01/2017    Diabetic hemoglobin A1C test  12/08/2017        HPI:   Chief Complaint   Patient presents with    Diabetes     Patient here today for a follow up with DM. He states he has a dry cough, which is worse at night and in the heat.  Cough       Patient's medications, allergies, past medical, surgical, social and family histories were reviewed and updated as appropriate. Review of Systems Reviewed and acurate. See MA note. OBJECTIVE:  BP (!) 150/100 (Site: Right Arm, Position: Sitting, Cuff Size: Large Adult)   Pulse 99   Wt (!) 322 lb (146.1 kg)   SpO2 98%   BMI 43.67 kg/m²    Physical Exam   Constitutional: He is oriented to person, place, and time. He appears well-developed and well-nourished. No distress. HENT:   Head: Normocephalic. Right Ear: Tympanic membrane normal.   Left Ear: Tympanic membrane normal.   Mouth/Throat: No oropharyngeal exudate. Eyes: Lids are normal.   Neck: Neck supple. Cardiovascular: Normal rate, regular rhythm and normal heart sounds. Pulmonary/Chest: Effort normal and breath sounds normal.   Abdominal: Soft. Bowel sounds are normal. He exhibits no distension. There is no tenderness. Musculoskeletal: He exhibits no edema. Lymphadenopathy:     He has no cervical adenopathy. Neurological: He is alert and oriented to person, place, and time. Skin: Skin is warm and dry. Psychiatric: He has a normal mood and affect. Vitals reviewed.       No results found for requested labs within last 30 days. Hemoglobin A1C (%)   Date Value   12/05/2017 9.0     Microscopic Examination (no units)   Date Value   06/28/2017 YES     LDL Calculated (mg/dL)   Date Value   06/09/2017 101         Lab Results   Component Value Date    WBC 15.0 07/12/2017    NEUTROABS 9.1 07/12/2017    HGB 15.1 07/12/2017    HCT 44.7 07/12/2017    MCV 86.3 07/12/2017     07/12/2017       Lab Results   Component Value Date    TSH 1.98 09/09/2016       Prior to Visit Medications    Medication Sig Taking? Authorizing Provider   amLODIPine (NORVASC) 10 MG tablet TAKE 1 TABLET BY MOUTH Once a day Yes TANESHA Dejesus   saxagliptin (ONGLYZA) 5 MG TABS tablet Take 1 tablet by mouth daily For sugar Yes TANESHA Dejesus   pravastatin (PRAVACHOL) 40 MG tablet TAKE 1 TABLET BY MOUTH EVERY EVENING Yes TANESHA Dejesus   glipiZIDE (GLUCOTROL) 10 MG tablet TAKE 2 TABLETS BY MOUTH TWO TIMES A DAY Yes TANESHA Dejesus   insulin glargine (LANTUS) 100 UNIT/ML injection vial Inject 65 Units into the skin nightly Yes TANESHA Dejesus   diclofenac sodium 1 % GEL Apply 4 g topically 4 times daily Yes TANESHA Dejesus   omeprazole (PRILOSEC) 40 MG delayed release capsule Take 1 capsule by mouth daily. Yes TANESHA Dejesus   levothyroxine (SYNTHROID) 75 MCG tablet TAKE 1 TABLET BY MOUTH ONCE DAILY.  Yes TANESHA Dejesus   allopurinol (ZYLOPRIM) 100 MG tablet Take 1 tablet by mouth daily Yes TANESHA Dejesus   lisinopril (PRINIVIL;ZESTRIL) 40 MG tablet TAKE 1 TABLET BY MOUTH DAILY DOSAGE INCREASED Yes TANESHA Dejesus   meloxicam (MOBIC) 7.5 MG tablet  Yes Historical Provider, MD   amitriptyline (ELAVIL) 50 MG tablet Take 1-2 tablets by mouth nightly For sleep Yes TANESHA Dejesus   metolazone (ZAROXOLYN) 2.5 MG tablet Take 1 tablet by mouth every other day For swelling Yes TANESHA Dejesus   carvedilol (COREG) 12.5 MG tablet Take 1 tablet by mouth 2 times daily (with

## 2017-12-05 NOTE — PROGRESS NOTES
Have you seen any other physician or provider since your last visit? no    Have you had any other diagnostic tests since your last visit? no    Have you changed or stopped any medications since your last visit including any over-the-counter medicines, vitamins, or herbal medicines? no     Are you taking all your prescribed medications? Yes  If NO, why? -  N/A    BP Readings from Last 2 Encounters:   10/05/17 124/70   09/08/17 (!) 164/90     Hemoglobin A1C (%)   Date Value   09/08/2017 8.6     Microscopic Examination (no units)   Date Value   06/28/2017 YES     LDL Calculated (mg/dL)   Date Value   06/09/2017 101              Tobacco use:  Patient  reports that he has never smoked. He has never used smokeless tobacco.    If Smoker - Cessation materials given? No    Is Daily aspirin on medication list?:  No    Diabetic retinal exam done? No   If yes, document in Health Maintenance. Monofilament placed on counter? No    Shoes and socks removed? No    BP taken with correct size cuff? Yes   Repeated if > 130/80 No     Microalbumin performed if applicable? No    REVIEW OF SYSTEMS:  Review of Systems   Constitutional: Negative for chills and fever. HENT: Negative for ear pain and sore throat. Eyes: Negative for pain and visual disturbance. Respiratory: Positive for cough. Negative for shortness of breath. Cardiovascular: Negative for chest pain, palpitations and leg swelling. Gastrointestinal: Negative for abdominal pain, nausea and vomiting. Genitourinary: Negative for dysuria and hematuria. Musculoskeletal: Negative for joint swelling. Skin: Negative for rash. Neurological: Negative for dizziness and weakness. Psychiatric/Behavioral: Negative for sleep disturbance.

## 2018-02-01 ENCOUNTER — HOSPITAL ENCOUNTER (OUTPATIENT)
Dept: OTHER | Age: 63
Discharge: HOME | End: 2018-02-01
Attending: INTERNAL MEDICINE | Admitting: INTERNAL MEDICINE

## 2018-02-01 ENCOUNTER — OFFICE VISIT (OUTPATIENT)
Dept: CARDIOLOGY | Facility: CLINIC | Age: 63
End: 2018-02-01

## 2018-02-01 VITALS
DIASTOLIC BLOOD PRESSURE: 104 MMHG | WEIGHT: 315 LBS | HEART RATE: 74 BPM | SYSTOLIC BLOOD PRESSURE: 146 MMHG | BODY MASS INDEX: 42.66 KG/M2 | HEIGHT: 72 IN

## 2018-02-01 DIAGNOSIS — I10 ESSENTIAL HYPERTENSION: Primary | ICD-10-CM

## 2018-02-01 DIAGNOSIS — R60.0 LOCALIZED EDEMA: ICD-10-CM

## 2018-02-01 DIAGNOSIS — E78.2 MIXED HYPERLIPIDEMIA: ICD-10-CM

## 2018-02-01 DIAGNOSIS — G47.33 OSA (OBSTRUCTIVE SLEEP APNEA): ICD-10-CM

## 2018-02-01 PROCEDURE — 93000 ELECTROCARDIOGRAM COMPLETE: CPT | Performed by: NURSE PRACTITIONER

## 2018-02-01 PROCEDURE — 99204 OFFICE O/P NEW MOD 45 MIN: CPT | Performed by: NURSE PRACTITIONER

## 2018-02-01 RX ORDER — LEVOTHYROXINE SODIUM 0.07 MG/1
75 TABLET ORAL DAILY
Refills: 5 | COMMUNITY
Start: 2017-12-30

## 2018-02-01 RX ORDER — TERAZOSIN 5 MG/1
5 CAPSULE ORAL NIGHTLY
Qty: 30 CAPSULE | Refills: 11 | Status: SHIPPED | OUTPATIENT
Start: 2018-02-01 | End: 2018-04-19 | Stop reason: DRUGHIGH

## 2018-02-01 RX ORDER — OMEPRAZOLE 40 MG/1
40 CAPSULE, DELAYED RELEASE ORAL DAILY
Refills: 5 | COMMUNITY
Start: 2017-12-30

## 2018-02-01 RX ORDER — GLIPIZIDE 10 MG/1
2 TABLET ORAL 2 TIMES DAILY
Refills: 5 | COMMUNITY
Start: 2017-12-30

## 2018-02-01 RX ORDER — PRAVASTATIN SODIUM 40 MG
TABLET ORAL NIGHTLY
Refills: 5 | COMMUNITY
Start: 2017-12-30 | End: 2018-04-19

## 2018-02-01 RX ORDER — AMLODIPINE BESYLATE 10 MG/1
TABLET ORAL DAILY
Refills: 5 | COMMUNITY
Start: 2017-12-30 | End: 2018-02-01 | Stop reason: SINTOL

## 2018-02-01 RX ORDER — LISINOPRIL 40 MG/1
40 TABLET ORAL DAILY
Refills: 5 | COMMUNITY
Start: 2017-12-30

## 2018-02-01 RX ORDER — SAXAGLIPTIN 5 MG/1
TABLET, FILM COATED ORAL DAILY
Refills: 5 | COMMUNITY
Start: 2017-12-30 | End: 2018-04-19

## 2018-02-01 RX ORDER — AMITRIPTYLINE HYDROCHLORIDE 50 MG/1
50 TABLET, FILM COATED ORAL NIGHTLY
Refills: 5 | COMMUNITY
Start: 2017-12-30 | End: 2018-04-19

## 2018-02-01 RX ORDER — CARVEDILOL 12.5 MG/1
12.5 TABLET ORAL 2 TIMES DAILY
Refills: 5 | COMMUNITY
Start: 2017-12-30

## 2018-02-01 RX ORDER — ALLOPURINOL 100 MG/1
100 TABLET ORAL DAILY
Refills: 5 | COMMUNITY
Start: 2017-12-30

## 2018-02-01 RX ORDER — INSULIN GLARGINE 100 [IU]/ML
40-75 INJECTION, SOLUTION SUBCUTANEOUS DAILY PRN
Refills: 5 | COMMUNITY
Start: 2017-12-30

## 2018-02-01 NOTE — PROGRESS NOTES
Encounter Date:02/01/2018    Location: [facility]    Frances MorrisMANDEEP gilbert    Patient ID: Thomas Briceño is a 62 y.o. male; resident of [place], [place]    1955  Subjective:      Chief Complaint/Reason for visit:    Chief Complaint   Patient presents with   • Chest Pain   • Irregular Heart Beat   • Shortness of Breath   • Dizziness   • Edema     both legs       Problem List:  1.  Exertional Dyspnea with fatigue and intermittent chest discomfort   A.  Echocardiogram 11/12/04:  No valvular abnormalities with normal EF   B.  Reportedly normal stress test 2005, Dr Johns' office  2.  Hypertension  3.  Hyperlipidemia  4.  Diabetes  5.  Obesity  6.  Family history of premature coronary artery disease  7.  Shock of sleep apnea/no CPAP  8.  Osteoarthritis  9.  Gout  10.  Surgical history: None    HPI:  Patient is a pleasant 62-year-old white male with the above-noted medical history who presents today for further evaluation and to reestablish care regarding his hypertension.  Since he was last seen, newly 10 years ago, he continues to have the same complaints as he did that time.  His blood pressure is still difficult to manage and he is not wearing a CPAP mask for his sleep apnea.  He also has dyspnea on exertion and two-pillow orthopnea.  He denies having any significant chest discomfort with physical activities and states that he is able to manage his daily routine at his own pace.  He states that he does not have any regular routine exercise and does very little activity.  He states that he is under great stress with health issues of his wife and is dealing with some difficult issues regarding his children at this time.  He states that when he gets anxious from these things he will notice some intermittent palpitations and worsening shortness of breath and these symptoms are self-limiting.  He states that he does try to monitor his blood pressures at home but has a difficult time finding finding a cuff that will fit his  arm.  His systolic and diastolic today are definitely higher than he should be therefore medication adjustments will be made accordingly.  He denies having any current chest pain, fatigue dizziness and syncope.  He does have mild bilateral lower extremity edema and this could be related to his amlodipine.  He admits to having little additional salt added to his food but does eat a lot of processed meals.  We discussed in detail the importance of avoiding things with sodium content to help prevent further exacerbations of his edema.  In lieu of his sleep apnea, we would like to gain better control of his hypertension first.  He did play needs to pursue reevaluation with the sleep study and mask fitting as there are many new options for mask that may be more tolerable for him.  We discussed the risks of having uncontrolled sleep apnea and he verbalized understanding.    Cardiac ROS:  Positive for shortness of breath, dyspnea on exertion, orthopnea, edema, palpitations, snoring/ BARRINGTON.  Negative for chest pain, PND, fatigue, dizziness, pre-syncope/ syncope,    Cardiac Risk Factors: advanced age (older than 55 for men, 65 for women), dyslipidemia, hypertension, male gender, obesity (BMI >= 30 kg/m2), sedentary lifestyle and smoking/ tobacco exposure  Social History     Social History   • Marital status:      Spouse name: N/A   • Number of children: N/A   • Years of education: N/A     Occupational History   • Not on file.     Social History Main Topics   • Smoking status: Never Smoker   • Smokeless tobacco: Never Used   • Alcohol use No   • Drug use: No   • Sexual activity: Defer     Other Topics Concern   • Not on file     Social History Narrative   • No narrative on file       family history includes Breast cancer in his sister; Colon cancer in his father; Heart attack in his brother and brother; Hypertension in his father.     has a past medical history of Acid reflux; Anxiety; Arthritis; Chronic kidney disease,  stage 3 (moderate); Depression; Diabetes; Gout; Hyperlipidemia; Hypertension; Hypothyroidism; and Kidney disorder.    No Known Allergies      Current Outpatient Prescriptions:   •  allopurinol (ZYLOPRIM) 100 MG tablet, Daily., Disp: , Rfl: 5  •  amitriptyline (ELAVIL) 50 MG tablet, Every Night., Disp: , Rfl: 5  •  carvedilol (COREG) 12.5 MG tablet, 2 (Two) Times a Day., Disp: , Rfl: 5  •  diclofenac (VOLTAREN) 1 % gel gel, As Needed., Disp: , Rfl: 5  •  glipiZIDE (GLUCOTROL) 10 MG tablet, 2 tablets 2 (Two) Times a Day., Disp: , Rfl: 5  •  LANTUS 100 UNIT/ML injection, 65 Units Daily., Disp: , Rfl: 5  •  levothyroxine (SYNTHROID, LEVOTHROID) 75 MCG tablet, Daily., Disp: , Rfl: 5  •  lisinopril (PRINIVIL,ZESTRIL) 40 MG tablet, Daily., Disp: , Rfl: 5  •  omeprazole (priLOSEC) 40 MG capsule, Daily., Disp: , Rfl: 5  •  ONGLYZA 5 MG tablet, Daily., Disp: , Rfl: 5  •  pravastatin (PRAVACHOL) 40 MG tablet, Every Night., Disp: , Rfl: 5  •  terazosin (HYTRIN) 5 MG capsule, Take 1 capsule by mouth Every Night., Disp: 30 capsule, Rfl: 11    Review of Systems   Constitution: Positive for weakness and weight gain.   HENT: Positive for hearing loss and tinnitus.    Eyes: Negative.         Wears glasses   Cardiovascular: Positive for dyspnea on exertion, leg swelling, orthopnea (2 pillow- chronic) and palpitations. Negative for chest pain, irregular heartbeat, near-syncope, paroxysmal nocturnal dyspnea and syncope.   Respiratory: Positive for cough, shortness of breath, sleep disturbances due to breathing and snoring (no CPAP). Negative for hemoptysis, sputum production and wheezing.    Endocrine: Negative.    Hematologic/Lymphatic: Negative.    Skin: Negative.    Musculoskeletal: Negative.    Gastrointestinal: Positive for heartburn.   Genitourinary: Positive for bladder incontinence and frequency.   Neurological: Positive for headaches.   Psychiatric/Behavioral: Positive for depression. The patient is nervous/anxious.     Allergic/Immunologic: Negative.        Vitals:    02/01/18 1420   BP: (!) 146/104   Pulse: 74          Objective:       Physical Exam   Constitutional: He is oriented to person, place, and time. He appears well-developed and well-nourished.   Morbidly obese   HENT:   Head: Normocephalic and atraumatic.   Eyes: Pupils are equal, round, and reactive to light. Right eye exhibits no discharge. Left eye exhibits no discharge.   Neck: Normal range of motion. Neck supple. No JVD present.   Cardiovascular: Normal rate, regular rhythm, normal heart sounds and intact distal pulses.  Exam reveals no gallop and no friction rub.    No murmur heard.  Pulmonary/Chest: Effort normal and breath sounds normal. He has no wheezes.   Abdominal: Soft. Bowel sounds are normal. There is no tenderness.   Musculoskeletal: Normal range of motion. He exhibits edema.   Neurological: He is alert and oriented to person, place, and time.   Skin: Skin is warm and dry.   Psychiatric: He has a normal mood and affect. His behavior is normal.       Data Review:     ECG 12 Lead  Date/Time: 2/1/2018 1:50 PM  Performed by: DANICA CAN  Authorized by: DANICA CAN   Comparison: not compared with previous ECG   Previous ECG: no previous ECG available  Rhythm: sinus rhythm  Rate: normal  BPM: 74  QRS axis: normal  Clinical impression: normal ECG        Assessment:      ICD-10-CM ICD-9-CM   1. Essential hypertension I10 401.9   2. Mixed hyperlipidemia E78.2 272.2   3. Localized edema R60.0 782.3   4. BARRINGTON (obstructive sleep apnea) G47.33 327.23       Plan:  We will work on obtaining better control of his hypertension; Stop Amlodipine and Start Terazosin 5mg daily  We'll further address plan of care when he returns in 2 months.  He definitely needs a reevaluation for his sleep apnea with mask fitting.   F/up in 2 months or sooner if needed.        Scribed for Sophia Caballero MD by MANDEEP Woo. 2/6/2018  2:07 PM     LAKHWINDER Caballero MD  personally performed the services described in this documentation as scribed by the above individual in my presence, and it is both accurate and complete.    Sophia Caballero MD, FACC

## 2018-02-06 PROBLEM — E78.2 MIXED HYPERLIPIDEMIA: Status: ACTIVE | Noted: 2018-02-06

## 2018-02-06 PROBLEM — G47.33 OSA (OBSTRUCTIVE SLEEP APNEA): Status: ACTIVE | Noted: 2018-02-06

## 2018-02-06 PROBLEM — I10 ESSENTIAL HYPERTENSION: Status: ACTIVE | Noted: 2018-02-06

## 2018-02-06 PROBLEM — R60.0 LOCALIZED EDEMA: Status: ACTIVE | Noted: 2018-02-06

## 2018-03-06 ENCOUNTER — OFFICE VISIT (OUTPATIENT)
Dept: PRIMARY CARE CLINIC | Age: 63
End: 2018-03-06
Payer: COMMERCIAL

## 2018-03-06 ENCOUNTER — HOSPITAL ENCOUNTER (OUTPATIENT)
Dept: OTHER | Age: 63
Discharge: HOME | End: 2018-03-06
Attending: NURSE PRACTITIONER | Admitting: NURSE PRACTITIONER

## 2018-03-06 VITALS
BODY MASS INDEX: 43.97 KG/M2 | WEIGHT: 315 LBS | HEART RATE: 90 BPM | SYSTOLIC BLOOD PRESSURE: 130 MMHG | OXYGEN SATURATION: 97 % | DIASTOLIC BLOOD PRESSURE: 70 MMHG

## 2018-03-06 DIAGNOSIS — E78.00 HYPERCHOLESTEREMIA: ICD-10-CM

## 2018-03-06 DIAGNOSIS — E11.9 TYPE 2 DIABETES MELLITUS WITHOUT COMPLICATION, UNSPECIFIED LONG TERM INSULIN USE STATUS: ICD-10-CM

## 2018-03-06 DIAGNOSIS — E03.9 HYPOTHYROIDISM, UNSPECIFIED TYPE: ICD-10-CM

## 2018-03-06 DIAGNOSIS — R35.1 NOCTURIA: ICD-10-CM

## 2018-03-06 DIAGNOSIS — E11.9 TYPE 2 DIABETES MELLITUS WITHOUT COMPLICATION, UNSPECIFIED LONG TERM INSULIN USE STATUS: Primary | ICD-10-CM

## 2018-03-06 DIAGNOSIS — G47.30 SLEEP APNEA, UNSPECIFIED TYPE: ICD-10-CM

## 2018-03-06 DIAGNOSIS — I10 ESSENTIAL HYPERTENSION: ICD-10-CM

## 2018-03-06 LAB
A/G RATIO: 2 (ref 0.8–2)
ALBUMIN SERPL-MCNC: 4.6 G/DL (ref 3.4–4.8)
ALP BLD-CCNC: 128 U/L (ref 25–100)
ALT SERPL-CCNC: 26 U/L (ref 4–36)
ANION GAP SERPL CALCULATED.3IONS-SCNC: 15 MMOL/L (ref 3–16)
AST SERPL-CCNC: 14 U/L (ref 8–33)
BASOPHILS ABSOLUTE: 0.1 K/UL (ref 0–0.1)
BASOPHILS RELATIVE PERCENT: 1.3 %
BILIRUB SERPL-MCNC: 0.4 MG/DL (ref 0.3–1.2)
BUN BLDV-MCNC: 11 MG/DL (ref 6–20)
CALCIUM SERPL-MCNC: 9.1 MG/DL (ref 8.5–10.5)
CHLORIDE BLD-SCNC: 100 MMOL/L (ref 98–107)
CHOLESTEROL, TOTAL: 222 MG/DL (ref 0–200)
CO2: 22 MMOL/L (ref 20–30)
CREAT SERPL-MCNC: 1.2 MG/DL (ref 0.4–1.2)
EOSINOPHILS ABSOLUTE: 2.1 K/UL (ref 0–0.4)
EOSINOPHILS RELATIVE PERCENT: 20.9 %
GFR AFRICAN AMERICAN: >59
GFR NON-AFRICAN AMERICAN: >59
GLOBULIN: 2.3 G/DL
GLUCOSE BLD-MCNC: 312 MG/DL (ref 74–106)
HBA1C MFR BLD: 11.9 %
HCT VFR BLD CALC: 47.6 % (ref 40–54)
HDLC SERPL-MCNC: 32 MG/DL (ref 40–60)
HEMOGLOBIN: 15.9 G/DL (ref 13–18)
IMMATURE GRANULOCYTES #: 0 K/UL
IMMATURE GRANULOCYTES %: 0.2 % (ref 0–5)
LDL CHOLESTEROL CALCULATED: 147 MG/DL
LYMPHOCYTES ABSOLUTE: 2.6 K/UL (ref 1.5–4)
LYMPHOCYTES RELATIVE PERCENT: 26.5 %
MCH RBC QN AUTO: 29.4 PG (ref 27–32)
MCHC RBC AUTO-ENTMCNC: 33.4 G/DL (ref 31–35)
MCV RBC AUTO: 88 FL (ref 80–100)
MONOCYTES ABSOLUTE: 0.5 K/UL (ref 0.2–0.8)
MONOCYTES RELATIVE PERCENT: 4.6 %
NEUTROPHILS ABSOLUTE: 4.6 K/UL (ref 2–7.5)
NEUTROPHILS RELATIVE PERCENT: 46.5 %
PDW BLD-RTO: 12.5 % (ref 11–16)
PLATELET # BLD: 247 K/UL (ref 150–400)
PMV BLD AUTO: 11.1 FL (ref 6–10)
POTASSIUM SERPL-SCNC: 4.8 MMOL/L (ref 3.4–5.1)
PROSTATE SPECIFIC ANTIGEN: 3.12 NG/ML (ref 0–4)
RBC # BLD: 5.41 M/UL (ref 4.5–6)
SODIUM BLD-SCNC: 137 MMOL/L (ref 136–145)
TOTAL PROTEIN: 6.9 G/DL (ref 6.4–8.3)
TRIGL SERPL-MCNC: 216 MG/DL (ref 0–249)
TSH SERPL DL<=0.05 MIU/L-ACNC: 2.86 UIU/ML (ref 0.35–5.5)
VLDLC SERPL CALC-MCNC: 43 MG/DL
WBC # BLD: 9.9 K/UL (ref 4–11)

## 2018-03-06 PROCEDURE — 99214 OFFICE O/P EST MOD 30 MIN: CPT | Performed by: NURSE PRACTITIONER

## 2018-03-06 RX ORDER — TERAZOSIN 5 MG/1
CAPSULE ORAL
Refills: 11 | COMMUNITY
Start: 2018-02-02 | End: 2019-05-07 | Stop reason: SDUPTHER

## 2018-03-06 ASSESSMENT — ENCOUNTER SYMPTOMS
EYE PAIN: 0
NAUSEA: 0
ABDOMINAL PAIN: 0
SORE THROAT: 0
SHORTNESS OF BREATH: 0
VOMITING: 0
COUGH: 0

## 2018-03-08 ENCOUNTER — TELEPHONE (OUTPATIENT)
Dept: PRIMARY CARE CLINIC | Age: 63
End: 2018-03-08

## 2018-03-08 RX ORDER — INSULIN GLARGINE 100 [IU]/ML
75 INJECTION, SOLUTION SUBCUTANEOUS NIGHTLY
Qty: 10 ML | Refills: 5 | Status: SHIPPED | OUTPATIENT
Start: 2018-03-08 | End: 2019-05-07 | Stop reason: SDUPTHER

## 2018-03-08 RX ORDER — PRAVASTATIN SODIUM 80 MG/1
TABLET ORAL
Qty: 30 TABLET | Refills: 3 | Status: SHIPPED | OUTPATIENT
Start: 2018-03-08 | End: 2019-05-07 | Stop reason: SDUPTHER

## 2018-03-15 ENCOUNTER — TELEPHONE (OUTPATIENT)
Dept: PRIMARY CARE CLINIC | Age: 63
End: 2018-03-15

## 2018-03-21 ENCOUNTER — TELEPHONE (OUTPATIENT)
Dept: PRIMARY CARE CLINIC | Age: 63
End: 2018-03-21

## 2018-04-04 RX ORDER — CARVEDILOL 12.5 MG/1
12.5 TABLET ORAL 2 TIMES DAILY WITH MEALS
Qty: 60 TABLET | Refills: 5 | Status: SHIPPED | OUTPATIENT
Start: 2018-04-04 | End: 2019-05-07 | Stop reason: SDUPTHER

## 2018-04-04 RX ORDER — AMITRIPTYLINE HYDROCHLORIDE 50 MG/1
TABLET, FILM COATED ORAL
Qty: 60 TABLET | Refills: 5 | Status: SHIPPED | OUTPATIENT
Start: 2018-04-04 | End: 2019-05-07 | Stop reason: SDUPTHER

## 2018-04-12 ENCOUNTER — TELEPHONE (OUTPATIENT)
Dept: PRIMARY CARE CLINIC | Age: 63
End: 2018-04-12

## 2018-04-18 NOTE — PROGRESS NOTES
Thomas Briceño  1955  [phone]      04/19/2018    MANDEEP Amaya    Chief Complaint   Patient presents with   • Chest Pain   • Shortness of Breath   • Hypertension     Problem List:  1. Exertional dyspnea  With fatigue and intermittent chest discomfort  a. Echocardiogram 11/12/2004: No valvular abnormalities with normal EF  b. Reportedly normal stress test 2005, Dr. Johns' office  2. Hypertension  3. Hyperlipidemia  4. Diabetes  5. Obesity  6. Family history of premature coronary artery disease  7. Shock of sleep apnea/CPAP  8. Osteoarthritis  9. Surgical history: None    No Known Allergies    Current Medications:      Current Outpatient Prescriptions:   •  allopurinol (ZYLOPRIM) 100 MG tablet, Take 100 mg by mouth Daily., Disp: , Rfl: 5  •  carvedilol (COREG) 12.5 MG tablet, Take 12.5 mg by mouth 2 (Two) Times a Day., Disp: , Rfl: 5  •  diclofenac (VOLTAREN) 1 % gel gel, Apply 4 g topically As Needed., Disp: , Rfl: 5  •  glipiZIDE (GLUCOTROL) 10 MG tablet, Take 2 tablets by mouth 2 (Two) Times a Day., Disp: , Rfl: 5  •  LANTUS 100 UNIT/ML injection, Inject 75 Units under the skin Every Night., Disp: , Rfl: 5  •  levothyroxine (SYNTHROID, LEVOTHROID) 75 MCG tablet, Take 75 mcg by mouth Daily., Disp: , Rfl: 5  •  lisinopril (PRINIVIL,ZESTRIL) 40 MG tablet, Take 40 mg by mouth Daily., Disp: , Rfl: 5  •  meloxicam (MOBIC) 7.5 MG tablet, Take 7.5 mg by mouth Daily., Disp: , Rfl:   •  metOLazone (ZAROXOLYN) 2.5 MG tablet, Take 2.5 mg by mouth Every Other Day., Disp: , Rfl:   •  omeprazole (priLOSEC) 40 MG capsule, Take 40 mg by mouth Daily., Disp: , Rfl: 5  •  pravastatin (PRAVACHOL) 80 MG tablet, Take 80 mg by mouth Daily., Disp: , Rfl:   •  TRADJENTA 5 MG tablet tablet, Take 5 mg by mouth Daily., Disp: , Rfl: 5  •  terazosin (HYTRIN) 10 MG capsule, Take 1 capsule by mouth Every Night., Disp: 90 capsule, Rfl: 2    HPI    Thomas Briceño presents today for 2 month follow up of exertional dyspnea, hypertension,  "and hyperlipidemia. Since last visit, patient has been complaining of chest pain. He describes the pain as sharp, and occurs and resolve spontaneously. He inquired about discoloration on his feet. Per most recent test, his blood sugar is increased. Patient denies palpitations, edema, PND, orthopnea, dizziness, and syncope. States that he can not walk more than a few feet with out getting short of breath.    The following portions of the patient's history were reviewed and updated as appropriate: allergies, current medications and problem list.    Pertinent positives as listed in the HPI.  All other systems reviewed are negative.    Vitals:    04/19/18 0905 04/19/18 0927   BP: 130/90 150/92   BP Location: Right arm Right arm   Patient Position: Sitting Sitting   Pulse: 68    Weight: (!) 147 kg (325 lb)    Height: 182.9 cm (72\")        Physical Exam:  General: Alert and oriented to person, place, and time.  Neck: Jugular venous pressure is within normal limits. Carotids have normal upstrokes without bruits.   Cardiovascular: Regular rate and rhythm without murmur gallop or rub.  Lungs: Clear without rales or wheezes. Equal expansion is noted.   Extremities: Show no edema. 1+ pulses.  Feet warm  Skin: warm and dry.  Neurologic: nonfocal    Diagnostic Data:    Procedures    Assessment:      ICD-10-CM ICD-9-CM   1. Chest pain, unspecified type R07.9 786.50   2. Dyspnea on exertion R06.09 786.09       Plan:  1. Echocardiogram  2. Schedule chemical stress test and echo to assess chest pain and dyspnea anginal episodes  3. Increase terazosin 5 mg to 10 mg nightly.  4. Continue current medications.  5. F/up in 1 months or sooner if needed.    Scribed for Sophia Caballero MD by Danny Florence. 4/19/2018  9:34 AM     I Sophia Caballero MD personally performed the services described in this documentation as scribed by the above individual in my presence, and it is both accurate and complete.    Sophia Caballero MD, " FACC

## 2018-04-19 ENCOUNTER — OFFICE VISIT (OUTPATIENT)
Dept: CARDIOLOGY | Facility: CLINIC | Age: 63
End: 2018-04-19

## 2018-04-19 VITALS
HEIGHT: 72 IN | DIASTOLIC BLOOD PRESSURE: 92 MMHG | HEART RATE: 68 BPM | SYSTOLIC BLOOD PRESSURE: 150 MMHG | WEIGHT: 315 LBS | BODY MASS INDEX: 42.66 KG/M2

## 2018-04-19 DIAGNOSIS — R06.09 DYSPNEA ON EXERTION: ICD-10-CM

## 2018-04-19 DIAGNOSIS — R07.9 CHEST PAIN, UNSPECIFIED TYPE: Primary | ICD-10-CM

## 2018-04-19 PROCEDURE — 99213 OFFICE O/P EST LOW 20 MIN: CPT | Performed by: INTERNAL MEDICINE

## 2018-04-19 RX ORDER — MELOXICAM 7.5 MG/1
7.5 TABLET ORAL DAILY
COMMUNITY

## 2018-04-19 RX ORDER — TERAZOSIN 10 MG/1
10 CAPSULE ORAL NIGHTLY
Qty: 90 CAPSULE | Refills: 2 | Status: SHIPPED | OUTPATIENT
Start: 2018-04-19

## 2018-04-19 RX ORDER — LINAGLIPTIN 5 MG/1
5 TABLET, FILM COATED ORAL DAILY
Refills: 5 | COMMUNITY
Start: 2018-03-21

## 2018-04-19 RX ORDER — PRAVASTATIN SODIUM 80 MG/1
80 TABLET ORAL DAILY
COMMUNITY

## 2018-04-19 RX ORDER — METOLAZONE 2.5 MG/1
2.5 TABLET ORAL EVERY OTHER DAY
COMMUNITY

## 2018-04-24 ENCOUNTER — OFFICE VISIT (OUTPATIENT)
Dept: PRIMARY CARE CLINIC | Age: 63
End: 2018-04-24
Payer: COMMERCIAL

## 2018-04-24 ENCOUNTER — OUTSIDE FACILITY SERVICE (OUTPATIENT)
Dept: CARDIOLOGY | Facility: CLINIC | Age: 63
End: 2018-04-24

## 2018-04-24 ENCOUNTER — TELEPHONE (OUTPATIENT)
Dept: PRIMARY CARE CLINIC | Age: 63
End: 2018-04-24

## 2018-04-24 ENCOUNTER — HOSPITAL ENCOUNTER (OUTPATIENT)
Dept: NON INVASIVE DIAGNOSTICS | Age: 63
Discharge: HOME | End: 2018-04-24

## 2018-04-24 VITALS
BODY MASS INDEX: 44.35 KG/M2 | SYSTOLIC BLOOD PRESSURE: 124 MMHG | WEIGHT: 315 LBS | DIASTOLIC BLOOD PRESSURE: 80 MMHG | OXYGEN SATURATION: 98 % | HEART RATE: 76 BPM

## 2018-04-24 DIAGNOSIS — G62.9 PERIPHERAL POLYNEUROPATHY: ICD-10-CM

## 2018-04-24 DIAGNOSIS — E11.9 TYPE 2 DIABETES MELLITUS WITHOUT COMPLICATION, UNSPECIFIED LONG TERM INSULIN USE STATUS: ICD-10-CM

## 2018-04-24 DIAGNOSIS — K42.9 UMBILICAL HERNIA WITHOUT OBSTRUCTION AND WITHOUT GANGRENE: Primary | ICD-10-CM

## 2018-04-24 LAB
LV EF: 58 %
LVEF MODALITY: NORMAL

## 2018-04-24 PROCEDURE — 99213 OFFICE O/P EST LOW 20 MIN: CPT | Performed by: NURSE PRACTITIONER

## 2018-04-24 PROCEDURE — 93306 TTE W/DOPPLER COMPLETE: CPT | Performed by: INTERNAL MEDICINE

## 2018-04-24 ASSESSMENT — ENCOUNTER SYMPTOMS
NAUSEA: 0
EYE PAIN: 0
SORE THROAT: 0
VOMITING: 0
COUGH: 0
SHORTNESS OF BREATH: 0

## 2018-04-26 ENCOUNTER — HOSPITAL ENCOUNTER (OUTPATIENT)
Dept: NUCLEAR MEDICINE | Age: 63
Discharge: HOME | End: 2018-04-26
Admitting: INTERNAL MEDICINE

## 2018-04-26 LAB
LV EF: 66 %
LVEF MODALITY: NORMAL

## 2018-04-30 ASSESSMENT — ENCOUNTER SYMPTOMS: ABDOMINAL PAIN: 1

## 2018-05-07 ENCOUNTER — OFFICE VISIT (OUTPATIENT)
Dept: UROLOGY | Facility: CLINIC | Age: 63
End: 2018-05-07

## 2018-05-07 VITALS
DIASTOLIC BLOOD PRESSURE: 70 MMHG | SYSTOLIC BLOOD PRESSURE: 148 MMHG | BODY MASS INDEX: 42.66 KG/M2 | TEMPERATURE: 98.5 F | RESPIRATION RATE: 18 BRPM | HEIGHT: 72 IN | WEIGHT: 315 LBS | HEART RATE: 61 BPM | OXYGEN SATURATION: 94 %

## 2018-05-07 DIAGNOSIS — N40.1 BPH WITH URINARY OBSTRUCTION: ICD-10-CM

## 2018-05-07 DIAGNOSIS — N13.8 BPH WITH URINARY OBSTRUCTION: ICD-10-CM

## 2018-05-07 DIAGNOSIS — E03.9 HYPOTHYROIDISM: ICD-10-CM

## 2018-05-07 DIAGNOSIS — G47.33 OBSTRUCTIVE SLEEP APNEA: Primary | ICD-10-CM

## 2018-05-07 PROCEDURE — 76857 US EXAM PELVIC LIMITED: CPT | Performed by: UROLOGY

## 2018-05-07 PROCEDURE — 99203 OFFICE O/P NEW LOW 30 MIN: CPT | Performed by: UROLOGY

## 2018-05-07 RX ORDER — LISINOPRIL 40 MG/1
40 TABLET ORAL DAILY
Qty: 30 TABLET | Refills: 5 | Status: SHIPPED | OUTPATIENT
Start: 2018-05-07 | End: 2019-05-07 | Stop reason: SDUPTHER

## 2018-05-07 RX ORDER — DESMOPRESSIN ACETATE 0.2 MG/1
0.2 TABLET ORAL NIGHTLY
Qty: 30 TABLET | Refills: 2 | Status: SHIPPED | OUTPATIENT
Start: 2018-05-07 | End: 2019-05-07

## 2018-05-07 RX ORDER — FINASTERIDE 5 MG/1
5 TABLET, FILM COATED ORAL DAILY
Qty: 30 TABLET | Refills: 2 | Status: SHIPPED | OUTPATIENT
Start: 2018-05-07 | End: 2019-05-07

## 2018-05-07 RX ORDER — OMEPRAZOLE 40 MG/1
CAPSULE, DELAYED RELEASE ORAL
Qty: 30 CAPSULE | Refills: 5 | Status: SHIPPED | OUTPATIENT
Start: 2018-05-07 | End: 2019-05-07 | Stop reason: SDUPTHER

## 2018-05-07 RX ORDER — ALLOPURINOL 100 MG/1
100 TABLET ORAL DAILY
Qty: 30 TABLET | Refills: 5 | Status: SHIPPED | OUTPATIENT
Start: 2018-05-07 | End: 2019-05-07 | Stop reason: SDUPTHER

## 2018-05-07 RX ORDER — LEVOTHYROXINE SODIUM 0.07 MG/1
TABLET ORAL
Qty: 30 TABLET | Refills: 5 | Status: SHIPPED | OUTPATIENT
Start: 2018-05-07 | End: 2019-05-07 | Stop reason: SDUPTHER

## 2018-05-07 NOTE — PROGRESS NOTES
Chief Complaint  Nocturia ([facility] referred for nocturia.)        MARIANNE Briceño is a 63 y.o. male who is referred for evaluation with a chief concern of nocturia ×3-5 times per night.  He has known obstructive sleep apnea has been fitted with CPAP device admits that he uses it inconsistently.  He is also morbidly obese and has problems with fluid retention which are both causes of nocturia unrelated to his prostate.  He minimizes difficulty voiding during the day although sometimes he does have hesitancy.  He has recently Hytrin he was taking from 5-10 mg but has noticed no improvement in his voiding.    Vitals:    05/07/18 1409   BP: 148/70   Pulse: 61   Resp: 18   Temp: 98.5 °F (36.9 °C)   SpO2: 94%       Past Medical History  Past Medical History:   Diagnosis Date   • Acid reflux    • Anxiety    • Arthritis    • Chronic kidney disease, stage 3 (moderate)    • Depression    • Diabetes    • Gout    • Hyperlipidemia    • Hypertension    • Hypothyroidism    • Kidney disorder        Past Surgical History  No past surgical history on file.    Medications  has a current medication list which includes the following prescription(s): allopurinol, carvedilol, diclofenac, glipizide, lantus, levothyroxine, lisinopril, meloxicam, omeprazole, pravastatin, terazosin, tradjenta, and metolazone.      Allergies  Allergies   Allergen Reactions   • Atenolol Other (See Comments)     dizzy       Social History  Social History     Social History Narrative   • No narrative on file       Family History  He has no family history of bladder or kidney cancer  He has no family history of kidney stones      AUA Symptom Score:    20  Review of Systems  Review of Systems  Positive for feeling poorly tired discharge from his eyes itchy eyes past heart rate swollen ankles shortness of breath wheezing coughing dyspnea with exertion dyspnea with lying flat limb swelling lambert pain swollen joints painful joints muscle pain frequency  heartburn limb weakness sleep disturbance anxiety chronic pain negative and other categories.  Physical Exam  Physical Exam   Constitutional: He is oriented to person, place, and time. He appears well-developed and well-nourished.   HENT:   Head: Normocephalic and atraumatic.   Neck: Normal range of motion.   Pulmonary/Chest: Effort normal. No respiratory distress.   Abdominal: Soft. He exhibits no distension and no mass. There is no tenderness. No hernia.   Genitourinary: Rectum normal, prostate normal and penis normal.   Musculoskeletal: Normal range of motion. He exhibits edema.   Lymphadenopathy:     He has no cervical adenopathy.   Neurological: He is alert and oriented to person, place, and time.   Skin: Skin is warm and dry.   Psychiatric: He has a normal mood and affect. His behavior is normal.   Vitals reviewed.      Labs Recent and today in the office:  No results found for this or any previous visit.      Assessment & Plan  #1 nocturia: He has 4 ounces of urine in the bladder 4 hours after he voided so he doesn't seem to be having trouble emptying.  He does have a 49 g prostate so might benefit from Proscar.  I feel his main problem is obstructive sleep apnea and he's encouraged to wear the mask on a consistent basis.  He admits his wife wakes him up frequently when he doesn't wear the mask because he is not breathing.  He'll need to return in 2 weeks with BMP to rule out hyponatremia.  #2 BPH with obstruction: Currently seems to be emptying the bladder fairly well but he'll be started on Proscar to shrink his enlarged prostate.  He is already taking maximum strength Hytrin for his alpha-blocker.

## 2018-05-07 NOTE — PROGRESS NOTES
[facility]- UROLOGY  [address], [address]  [place], [place] [address]  [phone]      05/07/2018    Thomas Briceño  1955        Pelvic Ultrasound with PVR    A transabdominal pelvic ultrasound was performed using a 3.5 MHz transducer of a B-K Villafana through the suprapubic area.     The purpose of the study was to investigate  nocturia.  There was minimal bladder wall thickening noted.  There were no intravesical filling defects seen.  The post void residual of 137 ml was noted and he had not voided in 4 hours.  Prostate was homogeneous and was noted to be 49.2 grams.  There was a protrusion of the prostate into the bladder.  Ultrasound images will be scanned into the chart for reference.       CPT code 84012        Performed by Brett Berg MD

## 2018-05-07 NOTE — ADDENDUM NOTE
ANASTASIIATCB and schedule a follow up appt Addended by: MAXIMILIAN WEI on: 5/7/2018 02:46 PM     Modules accepted: Orders

## 2018-05-10 ENCOUNTER — OFFICE VISIT (OUTPATIENT)
Dept: SURGERY | Facility: CLINIC | Age: 63
End: 2018-05-10

## 2018-05-10 VITALS
OXYGEN SATURATION: 97 % | HEIGHT: 72 IN | DIASTOLIC BLOOD PRESSURE: 103 MMHG | SYSTOLIC BLOOD PRESSURE: 170 MMHG | WEIGHT: 315 LBS | TEMPERATURE: 97.6 F | HEART RATE: 70 BPM | BODY MASS INDEX: 42.66 KG/M2

## 2018-05-10 DIAGNOSIS — K42.9 UMBILICAL HERNIA WITHOUT OBSTRUCTION AND WITHOUT GANGRENE: Primary | ICD-10-CM

## 2018-05-10 PROCEDURE — 99203 OFFICE O/P NEW LOW 30 MIN: CPT | Performed by: SURGERY

## 2018-05-10 RX ORDER — AMITRIPTYLINE HYDROCHLORIDE 50 MG/1
50 TABLET, FILM COATED ORAL NIGHTLY PRN
COMMUNITY
Start: 2018-05-07

## 2018-05-10 NOTE — PROGRESS NOTES
Patient: Thomas Briceño    YOB: 1955    Date: 05/10/2018    Primary Care Provider: MANDEEP Amaya    Reason for Consultation: Hernia    Chief Complaint   Patient presents with   • Mass     Umbilical mass       Subjective .     History of present illness:  I saw the patient in the office today as a consultation for evaluation and treatment of a mass at the umbilicus that he has had for several months.  He describes it as a small knot.  He denies increase in size.  He denies pain from it.  No change in bowel habits and no significant abdominal pain.    The following portions of the patient's history were reviewed and updated as appropriate: allergies, current medications, past family history, past medical history, past social history, past surgical history and problem list.    Review of Systems   Constitutional: Negative for chills, fever and unexpected weight change.   HENT: Negative for trouble swallowing and voice change.    Eyes: Negative for visual disturbance.   Respiratory: Negative for apnea, cough, chest tightness, shortness of breath and wheezing.    Cardiovascular: Negative for chest pain, palpitations and leg swelling.   Gastrointestinal: Positive for abdominal distention. Negative for abdominal pain, anal bleeding, blood in stool, constipation, diarrhea, nausea, rectal pain and vomiting.   Endocrine: Negative for cold intolerance and heat intolerance.   Genitourinary: Negative for difficulty urinating, dysuria, flank pain, scrotal swelling and testicular pain.   Musculoskeletal: Negative for back pain, gait problem and joint swelling.   Skin: Negative for color change, rash and wound.   Neurological: Negative for dizziness, syncope, speech difficulty, weakness, numbness and headaches.   Hematological: Negative for adenopathy. Does not bruise/bleed easily.   Psychiatric/Behavioral: Negative for confusion. The patient is not nervous/anxious.        History:  Past Medical History:    Diagnosis Date   • Acid reflux    • Anxiety    • Arthritis    • Chronic kidney disease, stage 3 (moderate)    • Depression    • Diabetes    • Gout    • Hyperlipidemia    • Hypertension    • Hypothyroidism    • Kidney disorder        History reviewed. No pertinent surgical history.    Family History   Problem Relation Age of Onset   • Hypertension Father    • Colon cancer Father    • Breast cancer Sister    • Heart attack Brother    • Heart attack Brother        Social History   Substance Use Topics   • Smoking status: Never Smoker   • Smokeless tobacco: Never Used   • Alcohol use No       Allergies:  Allergies   Allergen Reactions   • Atenolol Other (See Comments)     dizzy       Medications:    Current Outpatient Prescriptions:   •  allopurinol (ZYLOPRIM) 100 MG tablet, Take 100 mg by mouth Daily., Disp: , Rfl: 5  •  amitriptyline (ELAVIL) 50 MG tablet, , Disp: , Rfl:   •  carvedilol (COREG) 12.5 MG tablet, Take 12.5 mg by mouth 2 (Two) Times a Day., Disp: , Rfl: 5  •  desmopressin (DDAVP) 0.2 MG tablet, Take 1 tablet by mouth Every Night., Disp: 30 tablet, Rfl: 2  •  diclofenac (VOLTAREN) 1 % gel gel, Apply 4 g topically As Needed., Disp: , Rfl: 5  •  finasteride (PROSCAR) 5 MG tablet, Take 1 tablet by mouth Daily., Disp: 30 tablet, Rfl: 2  •  glipiZIDE (GLUCOTROL) 10 MG tablet, Take 2 tablets by mouth 2 (Two) Times a Day., Disp: , Rfl: 5  •  LANTUS 100 UNIT/ML injection, Inject 75 Units under the skin Every Night., Disp: , Rfl: 5  •  levothyroxine (SYNTHROID, LEVOTHROID) 75 MCG tablet, Take 75 mcg by mouth Daily., Disp: , Rfl: 5  •  lisinopril (PRINIVIL,ZESTRIL) 40 MG tablet, Take 40 mg by mouth Daily., Disp: , Rfl: 5  •  meloxicam (MOBIC) 7.5 MG tablet, Take 7.5 mg by mouth Daily., Disp: , Rfl:   •  metOLazone (ZAROXOLYN) 2.5 MG tablet, Take 2.5 mg by mouth Every Other Day., Disp: , Rfl:   •  omeprazole (priLOSEC) 40 MG capsule, Take 40 mg by mouth Daily., Disp: , Rfl: 5  •  pravastatin (PRAVACHOL) 80 MG  tablet, Take 80 mg by mouth Daily., Disp: , Rfl:   •  terazosin (HYTRIN) 10 MG capsule, Take 1 capsule by mouth Every Night., Disp: 90 capsule, Rfl: 2  •  TRADJENTA 5 MG tablet tablet, Take 5 mg by mouth Daily., Disp: , Rfl: 5    Objective     Vital Signs:   Temp:  [97.6 °F (36.4 °C)] 97.6 °F (36.4 °C)  Heart Rate:  [70] 70  BP: (170)/(103) 170/103    Physical Exam:   General Appearance:    Alert, cooperative, in no acute distress   Head:    Normocephalic, without obvious abnormality, atraumatic   Eyes:            Lids and lashes normal, conjunctivae and sclerae normal, no   icterus, no pallor, corneas clear, PERRLA   Ears:    Ears appear intact with no abnormalities noted   Throat:   No oral lesions, no thrush, oral mucosa moist   Neck:   No adenopathy, supple, trachea midline, no thyromegaly, no   carotid bruit, no JVD   Lungs:     Clear to auscultation,respirations regular, even and                  unlabored    Heart:    Regular rhythm and normal rate, normal S1 and S2, no            murmur   Abdomen:     no masses, no organomegaly, soft non-tender, non-distended, no guarding, there is evidence of a Small 1.5 cm umbilical hernia, reducible and nontender.    Extremities:   Moves all extremities well, no edema, no cyanosis, no             redness   Pulses:   Pulses palpable and equal bilaterally   Skin:   No bleeding, bruising or rash   Lymph nodes:   No palpable adenopathy   Neurologic:   Cranial nerves 2 - 12 grossly intact, sensation intact        Results Review:   I reviewed the patient's new clinical results.    Review of Systems was reviewed and confirmed as accurate today.    Assessment/Plan :    1. Umbilical hernia without obstruction and without gangrene        Hernia small, has preperitoneal fat only and no bowel. Patient reassured, no need for surgery at this time. If hernia increases in size or becomes symptomatic, he will follow up to schedule repair.     I discussed the patients findings and my  recommendations with patient.    Electronically signed by Luz Hurd MD  05/10/18    Scribed for Luz Hurd MD by Michelle Milligan. 5/10/2018  9:59 AM

## 2018-05-12 ENCOUNTER — RESULTS ENCOUNTER (OUTPATIENT)
Dept: UROLOGY | Facility: CLINIC | Age: 63
End: 2018-05-12

## 2018-05-12 DIAGNOSIS — G47.33 OBSTRUCTIVE SLEEP APNEA: ICD-10-CM

## 2018-05-17 ENCOUNTER — OFFICE VISIT (OUTPATIENT)
Dept: CARDIOLOGY | Facility: CLINIC | Age: 63
End: 2018-05-17

## 2018-05-17 VITALS
WEIGHT: 315 LBS | HEIGHT: 72 IN | BODY MASS INDEX: 42.66 KG/M2 | HEART RATE: 64 BPM | SYSTOLIC BLOOD PRESSURE: 144 MMHG | DIASTOLIC BLOOD PRESSURE: 90 MMHG

## 2018-05-17 DIAGNOSIS — I10 ESSENTIAL HYPERTENSION: Primary | ICD-10-CM

## 2018-05-17 DIAGNOSIS — E78.2 MIXED HYPERLIPIDEMIA: ICD-10-CM

## 2018-05-17 DIAGNOSIS — R06.02 EXERTIONAL SHORTNESS OF BREATH: ICD-10-CM

## 2018-05-17 PROCEDURE — 99213 OFFICE O/P EST LOW 20 MIN: CPT | Performed by: NURSE PRACTITIONER

## 2018-05-17 NOTE — PROGRESS NOTES
Thomas Briceño  1955  [phone]      05/17/2018    MANDEEP Amaya    Chief Complaint   Patient presents with   • Chest Pain   • Shortness of Breath   • Hypertension       Problem List:  1. Exertional dyspnea  With fatigue and intermittent chest discomfort  a. Echocardiogram 11/12/2004: No valvular abnormalities with normal EF  b. Reportedly normal stress test 2005, Dr. Johns' office  c. Nuclear stress test 4/26/2018: EF 66%. Small area of severely decreased radiotracer. No ischemia.   2. Hypertension  3. Hyperlipidemia  4. Diabetes  5. Obesity  6. Family history of premature coronary artery disease  7. Shock of sleep apnea/CPAP  8. Osteoarthritis  9. Surgical history: None    Allergies   Allergen Reactions   • Atenolol Other (See Comments)     dizzy       Current Medications:      Current Outpatient Prescriptions:   •  allopurinol (ZYLOPRIM) 100 MG tablet, Take 100 mg by mouth Daily., Disp: , Rfl: 5  •  amitriptyline (ELAVIL) 50 MG tablet, Take 50 mg by mouth At Night As Needed. 2 tabs at night, Disp: , Rfl:   •  carvedilol (COREG) 12.5 MG tablet, Take 12.5 mg by mouth 2 (Two) Times a Day., Disp: , Rfl: 5  •  desmopressin (DDAVP) 0.2 MG tablet, Take 1 tablet by mouth Every Night., Disp: 30 tablet, Rfl: 2  •  diclofenac (VOLTAREN) 1 % gel gel, Apply 4 g topically As Needed., Disp: , Rfl: 5  •  finasteride (PROSCAR) 5 MG tablet, Take 1 tablet by mouth Daily., Disp: 30 tablet, Rfl: 2  •  glipiZIDE (GLUCOTROL) 10 MG tablet, Take 2 tablets by mouth 2 (Two) Times a Day., Disp: , Rfl: 5  •  LANTUS 100 UNIT/ML injection, Inject 40-75 Units under the skin Daily As Needed., Disp: , Rfl: 5  •  levothyroxine (SYNTHROID, LEVOTHROID) 75 MCG tablet, Take 75 mcg by mouth Daily., Disp: , Rfl: 5  •  lisinopril (PRINIVIL,ZESTRIL) 40 MG tablet, Take 40 mg by mouth Daily., Disp: , Rfl: 5  •  meloxicam (MOBIC) 7.5 MG tablet, Take 7.5 mg by mouth Daily., Disp: , Rfl:   •  metOLazone (ZAROXOLYN) 2.5 MG tablet, Take 2.5 mg by  "mouth Every Other Day., Disp: , Rfl:   •  omeprazole (priLOSEC) 40 MG capsule, Take 40 mg by mouth Daily., Disp: , Rfl: 5  •  pravastatin (PRAVACHOL) 80 MG tablet, Take 80 mg by mouth Daily., Disp: , Rfl:   •  terazosin (HYTRIN) 10 MG capsule, Take 1 capsule by mouth Every Night., Disp: 90 capsule, Rfl: 2  •  TRADJENTA 5 MG tablet tablet, Take 5 mg by mouth Daily., Disp: , Rfl: 5    HPI    Thomas Briceño is a pleasant 63 year old male who presents today for 1 month follow up of shortness of breath. Since last visit, patient has undergone cardiolite stress testing that showed no evidence of ischemia.  He still complains of shortness of breath with exertion.  It seems that this may be more deconditioning issue considering that his Cardiolite was normal.  We discussed short goals of walking short distances and working his way back up.  He states that there has been a great deal of family stress that he is wearing heavy on him as well.  His blood pressures tend to run within the 130s to 140s range consistently.  However there are times where his systolic does get up to 170s during the stressful situations.  We discussed him taking an additional half tablet of his lisinopril if his systolic should be greater than 160.  He denies having any current chest pain, edema, fatigue, palpitations, dizziness and syncope.     The following portions of the patient's history were reviewed and updated as appropriate: allergies, current medications and problem list.    Pertinent positives as listed in the HPI.  All other systems reviewed are negative.    Vitals:    05/17/18 1006   BP: 144/90   BP Location: Right arm   Patient Position: Sitting   Pulse: 64   Weight: (!) 148 kg (326 lb 12.8 oz)   Height: 182.9 cm (72\")       Physical Exam:  GENERAL: well-developed, well-nourished; in no acute distress.   NECK:  There is no jugular venous distention at 30°.  Carotid upstrokes are 2+ and  symmetrical without bruits.   LUNGS: Clear to " auscultation bilaterally without wheezing, rhonchi, or rales noted.   CARDIOVASCULAR: The heart has a regular rate with a normal S1 and S2. There is no murmur, gallop, rub, or click appreciated. The PMI is nondisplaced.   ABDOMEN: Soft and nontender  NEUROLOGICAL: Nonfocal; Alert and oriented  PERIPHERAL VASCULAR:  Posterior tibial and dorsalis pedis pulses are 2+ and symmetrical. There is no peripheral edema.   MUSCULOSKELETAL:  Normal ROM  SKIN:  Warm and dry  PSYCHIATRIC: normal mood and affect; behavior appropriate    Diagnostic Data:    Procedures    Assessment:      ICD-10-CM ICD-9-CM   1. Essential hypertension I10 401.9   2. Mixed hyperlipidemia E78.2 272.2   3. Exertional shortness of breath- likely due to deconditioning R06.02 786.05       Plan:  1. Exercise regularly  2. Can take an additional half tablet of lisinopril as needed for systolic greater than 160  3. Continue current medications.  4. F/up in 12 months or sooner if needed.    Seen independently by MANDEEP Woo on . 5/20/2018  12:15 PM

## 2018-05-20 PROBLEM — R06.02 EXERTIONAL SHORTNESS OF BREATH: Status: ACTIVE | Noted: 2018-05-20

## 2018-05-23 LAB
BUN SERPL-MCNC: 15 MG/DL (ref 7–20)
BUN/CREAT SERPL: 13.6 (ref 6.3–21.9)
CALCIUM SERPL-MCNC: 9.3 MG/DL (ref 8.4–10.2)
CHLORIDE SERPL-SCNC: 103 MMOL/L (ref 98–107)
CO2 SERPL-SCNC: 24 MMOL/L (ref 26–30)
CREAT SERPL-MCNC: 1.1 MG/DL (ref 0.6–1.3)
GFR SERPLBLD CREATININE-BSD FMLA CKD-EPI: 67 ML/MIN/1.73
GFR SERPLBLD CREATININE-BSD FMLA CKD-EPI: 81 ML/MIN/1.73
GLUCOSE SERPL-MCNC: 234 MG/DL (ref 74–98)
POTASSIUM SERPL-SCNC: 4.4 MMOL/L (ref 3.5–5.1)
SODIUM SERPL-SCNC: 139 MMOL/L (ref 137–145)

## 2018-05-24 ENCOUNTER — OFFICE VISIT (OUTPATIENT)
Dept: UROLOGY | Facility: CLINIC | Age: 63
End: 2018-05-24

## 2018-05-24 VITALS
OXYGEN SATURATION: 98 % | HEART RATE: 69 BPM | DIASTOLIC BLOOD PRESSURE: 74 MMHG | HEIGHT: 72 IN | SYSTOLIC BLOOD PRESSURE: 143 MMHG | BODY MASS INDEX: 42.66 KG/M2 | WEIGHT: 315 LBS | TEMPERATURE: 98.1 F

## 2018-05-24 DIAGNOSIS — N28.9 RENAL INSUFFICIENCY: Primary | ICD-10-CM

## 2018-05-24 PROCEDURE — 99213 OFFICE O/P EST LOW 20 MIN: CPT | Performed by: UROLOGY

## 2018-05-24 NOTE — PROGRESS NOTES
Chief Complaint  Nocturia (2 week fup with bmp results.); Benign Prostatic Hypertrophy; and Sleep Apnea        HPI  Keyanna is a 63 y.o. male who returns today for follow-up of his nocturia ×5-6 stating that on DDAVP he has nocturia ×0-1.  Obviously this proves that his nocturia is the result of his obstructive sleep apnea and unrelated to the prostate.  He's (a mask but has a hard time tolerating it.  Fortunately recent blood work indicated no hyponatremia so he seems be tolerating the medication okay.    Vitals:    05/24/18 1325   BP: 143/74   Pulse: 69   Temp: 98.1 °F (36.7 °C)   SpO2: 98%       Past Medical History  Past Medical History:   Diagnosis Date   • Acid reflux    • Anxiety    • Arthritis    • Chronic kidney disease, stage 3 (moderate)    • Depression    • Diabetes    • Exertional shortness of breath 5/20/2018   • Gout    • Hyperlipidemia    • Hypertension    • Hypothyroidism    • Kidney disorder        Past Surgical History  No past surgical history on file.    Medications  has a current medication list which includes the following prescription(s): allopurinol, amitriptyline, carvedilol, desmopressin, diclofenac, finasteride, glipizide, lantus, levothyroxine, lisinopril, meloxicam, metolazone, omeprazole, pravastatin, terazosin, and tradjenta.      Allergies  Allergies   Allergen Reactions   • Atenolol Other (See Comments)     dizzy       Social History  Social History     Social History Narrative   • No narrative on file       Family History  He has no family history of bladder or kidney cancer  He has no family history of kidney stones      AUA Symptom Score:      Review of Systems  Review of Systems   Constitutional: Negative.    Genitourinary: Negative.    All other systems reviewed and are negative.      Physical Exam  Physical Exam    Labs Recent and today in the office:  Results for orders placed or performed in visit on 05/24/18   Basic Metabolic Panel   Result Value Ref Range    Glucose 234 (H)  74 - 98 mg/dL    BUN 15 7 - 20 mg/dL    Creatinine 1.10 0.60 - 1.30 mg/dL    eGFR Non African Am 67 >60 mL/min/1.73    eGFR African Am 81 >60 mL/min/1.73    BUN/Creatinine Ratio 13.6 6.3 - 21.9    Sodium 139 137 - 145 mmol/L    Potassium 4.4 3.5 - 5.1 mmol/L    Chloride 103 98 - 107 mmol/L    Total CO2 24.0 (L) 26.0 - 30.0 mmol/L    Calcium 9.3 8.4 - 10.2 mg/dL         Assessment & Plan  #1 nocturia: This is a associated with obstructive sleep apnea and has responded to DDAVP.  He is informed that it is critical he wear his CPAP device and lose some weight.

## 2019-01-16 ENCOUNTER — TELEPHONE (OUTPATIENT)
Dept: PRIMARY CARE CLINIC | Age: 64
End: 2019-01-16

## 2019-05-03 ENCOUNTER — TELEPHONE (OUTPATIENT)
Dept: PRIMARY CARE CLINIC | Age: 64
End: 2019-05-03

## 2019-05-03 NOTE — TELEPHONE ENCOUNTER
Called Yaritza concerning patients medication and him not having any insurance. She will look over list and let us know what we can get on 340B.

## 2019-05-07 DIAGNOSIS — E03.9 HYPOTHYROIDISM: ICD-10-CM

## 2019-05-07 RX ORDER — AMITRIPTYLINE HYDROCHLORIDE 50 MG/1
TABLET, FILM COATED ORAL
Qty: 100 TABLET | Refills: 3 | Status: SHIPPED | OUTPATIENT
Start: 2019-05-07 | End: 2019-08-08 | Stop reason: SDUPTHER

## 2019-05-07 RX ORDER — LEVOTHYROXINE SODIUM 0.07 MG/1
TABLET ORAL
Qty: 100 TABLET | Refills: 3 | Status: SHIPPED | OUTPATIENT
Start: 2019-05-07 | End: 2020-07-02

## 2019-05-07 RX ORDER — LISINOPRIL 40 MG/1
40 TABLET ORAL DAILY
Qty: 100 TABLET | Refills: 3 | Status: SHIPPED | OUTPATIENT
Start: 2019-05-07 | End: 2020-02-27

## 2019-05-07 RX ORDER — GLIPIZIDE 10 MG/1
TABLET ORAL
Qty: 400 TABLET | Refills: 3 | Status: SHIPPED | OUTPATIENT
Start: 2019-05-07 | End: 2020-02-27

## 2019-05-07 RX ORDER — ALLOPURINOL 100 MG/1
100 TABLET ORAL DAILY
Qty: 100 TABLET | Refills: 3 | Status: SHIPPED | OUTPATIENT
Start: 2019-05-07 | End: 2021-01-05 | Stop reason: SDUPTHER

## 2019-05-07 RX ORDER — METOLAZONE 2.5 MG/1
2.5 TABLET ORAL EVERY OTHER DAY
Qty: 100 TABLET | Refills: 3 | Status: SHIPPED | OUTPATIENT
Start: 2019-05-07 | End: 2021-01-05 | Stop reason: SDUPTHER

## 2019-05-07 RX ORDER — MELOXICAM 7.5 MG/1
7.5 TABLET ORAL DAILY
Qty: 100 TABLET | Refills: 3 | Status: SHIPPED | OUTPATIENT
Start: 2019-05-07 | End: 2020-05-18

## 2019-05-07 RX ORDER — INSULIN GLARGINE 100 [IU]/ML
75 INJECTION, SOLUTION SUBCUTANEOUS NIGHTLY
Qty: 5 VIAL | Refills: 3 | Status: SHIPPED | OUTPATIENT
Start: 2019-05-07 | End: 2019-05-09

## 2019-05-07 RX ORDER — OMEPRAZOLE 40 MG/1
CAPSULE, DELAYED RELEASE ORAL
Qty: 90 CAPSULE | Refills: 3 | Status: SHIPPED | OUTPATIENT
Start: 2019-05-07 | End: 2020-06-25 | Stop reason: SDUPTHER

## 2019-05-07 RX ORDER — TERAZOSIN 5 MG/1
5 CAPSULE ORAL NIGHTLY
Qty: 100 CAPSULE | Refills: 3 | Status: SHIPPED | OUTPATIENT
Start: 2019-05-07 | End: 2020-06-25 | Stop reason: SDUPTHER

## 2019-05-07 RX ORDER — CARVEDILOL 12.5 MG/1
12.5 TABLET ORAL 2 TIMES DAILY WITH MEALS
Qty: 200 TABLET | Refills: 3 | Status: SHIPPED | OUTPATIENT
Start: 2019-05-07 | End: 2020-02-27

## 2019-05-07 RX ORDER — PRAVASTATIN SODIUM 80 MG/1
TABLET ORAL
Qty: 90 TABLET | Refills: 3 | Status: SHIPPED | OUTPATIENT
Start: 2019-05-07 | End: 2020-02-20

## 2019-08-08 ENCOUNTER — OFFICE VISIT (OUTPATIENT)
Dept: PRIMARY CARE CLINIC | Age: 64
End: 2019-08-08

## 2019-08-08 VITALS
SYSTOLIC BLOOD PRESSURE: 140 MMHG | DIASTOLIC BLOOD PRESSURE: 80 MMHG | BODY MASS INDEX: 42.66 KG/M2 | OXYGEN SATURATION: 97 % | HEIGHT: 72 IN | HEART RATE: 77 BPM | WEIGHT: 315 LBS

## 2019-08-08 DIAGNOSIS — E03.9 HYPOTHYROIDISM, UNSPECIFIED TYPE: ICD-10-CM

## 2019-08-08 DIAGNOSIS — N18.30 CHRONIC KIDNEY DISEASE, STAGE III (MODERATE) (HCC): ICD-10-CM

## 2019-08-08 DIAGNOSIS — N18.30 TYPE 2 DIABETES MELLITUS WITH STAGE 3 CHRONIC KIDNEY DISEASE, WITHOUT LONG-TERM CURRENT USE OF INSULIN (HCC): Primary | ICD-10-CM

## 2019-08-08 DIAGNOSIS — I10 ESSENTIAL HYPERTENSION: ICD-10-CM

## 2019-08-08 DIAGNOSIS — E11.22 TYPE 2 DIABETES MELLITUS WITH STAGE 3 CHRONIC KIDNEY DISEASE, WITHOUT LONG-TERM CURRENT USE OF INSULIN (HCC): Primary | ICD-10-CM

## 2019-08-08 PROCEDURE — 99214 OFFICE O/P EST MOD 30 MIN: CPT | Performed by: NURSE PRACTITIONER

## 2019-08-08 RX ORDER — AMITRIPTYLINE HYDROCHLORIDE 50 MG/1
TABLET, FILM COATED ORAL
Qty: 100 TABLET | Refills: 3 | Status: SHIPPED | OUTPATIENT
Start: 2019-08-08 | End: 2020-02-06

## 2019-08-08 ASSESSMENT — ENCOUNTER SYMPTOMS
SHORTNESS OF BREATH: 0
NAUSEA: 0
EYE PAIN: 0
ABDOMINAL PAIN: 0
SORE THROAT: 0
COUGH: 0
VOMITING: 0

## 2019-08-08 ASSESSMENT — PATIENT HEALTH QUESTIONNAIRE - PHQ9
1. LITTLE INTEREST OR PLEASURE IN DOING THINGS: 0
SUM OF ALL RESPONSES TO PHQ QUESTIONS 1-9: 0
2. FEELING DOWN, DEPRESSED OR HOPELESS: 0
SUM OF ALL RESPONSES TO PHQ QUESTIONS 1-9: 0
SUM OF ALL RESPONSES TO PHQ9 QUESTIONS 1 & 2: 0

## 2019-08-21 NOTE — PROGRESS NOTES
Have you seen any other physician or provider since your last visit? no    Have you had any other diagnostic tests since your last visit? no    Have you changed or stopped any medications since your last visit including any over-the-counter medicines, vitamins, or herbal medicines? no     Are you taking all your prescribed medications? Yes  If NO, why? -  N/A      REVIEW OF SYSTEMS:  Review of Systems   Constitutional: Negative for chills and fever. HENT: Negative for ear pain and sore throat. Eyes: Negative for pain and visual disturbance. Respiratory: Negative for cough and shortness of breath. Cardiovascular: Negative for chest pain, palpitations and leg swelling. Gastrointestinal: Negative for abdominal pain, nausea and vomiting. Genitourinary: Negative for dysuria and hematuria. Musculoskeletal: Negative for joint swelling. Skin: Negative for rash. Neurological: Negative for dizziness and weakness. Psychiatric/Behavioral: Negative for sleep disturbance.
EVENING 340B Yes TANESHA Kilgore   terazosin (HYTRIN) 5 MG capsule Take 1 capsule by mouth nightly 340B Yes TANESHA Kilgore   glipiZIDE (GLUCOTROL) 10 MG tablet TAKE 2 TABLETS BY MOUTH TWO TIMES A DAY 340B Yes TANESHA Kilgore   diclofenac sodium 1 % GEL Apply 4 g topically 4 times daily 340B Yes TANESHA Kilgore   meloxicam (MOBIC) 7.5 MG tablet Take 1 tablet by mouth daily 340B Yes TANESHA Kilgore   metolazone (ZAROXOLYN) 2.5 MG tablet Take 1 tablet by mouth every other day For swelling 340B Yes TANESHA Kilgore   Needles & Syringes MISC 1 each by Does not apply route daily u-100 insulin syringe Yes TANESHA Kilgore   FREESTYLE LANCETS MISC TEST TWO TIMES A DAY Yes TANESHA Kilgore   FREESTYLE LITE strip TEST BLOOD SUGAR TWO TIMES A DAY Yes TANESHA Kilgore   Blood Glucose Monitoring Suppl (FREESTYLE LITE) PREMA by Does not apply route. Yes Brennen Diaz MD   Respiratory Therapy Supplies PREMA by Does not apply route. Casey Mark MD   glucose blood VI test strips (FREESTYLE LITE) strip As needed. Dx: DM2  TANESHA Kilgore       ASSESSMENT:  1. Type 2 diabetes mellitus with stage 3 chronic kidney disease, without long-term current use of insulin ([facility] [address]    2. Hypothyroidism, unspecified type    3. Chronic kidney disease, stage III (moderate) ([facility])    4. Essential hypertension          PLAN:    Orders Placed This Encounter   Medications    amitriptyline (ELAVIL) 50 MG tablet     Sig: TAKE 1 TO 2 TABLETS BY MOUTH NIGHTLY FOR SLEEP 340B     Dispense:  100 tablet     Refill:  3     Orders Placed This Encounter   Procedures    LIPID PANEL    COMPREHENSIVE METABOLIC PANEL    TSH without Reflex    HEMOGLOBIN A1C     Patient Instructions   Amitriptyline sent to [facility] to take at bedtime for sleep and pain. Return in about 3 months (around 11/8/2019). Return for fasting labs.

## 2019-11-25 RX ORDER — INSULIN GLARGINE 100 [IU]/ML
INJECTION, SOLUTION SUBCUTANEOUS
Qty: 15 ML | Refills: 3 | Status: SHIPPED | OUTPATIENT
Start: 2019-11-25 | End: 2020-02-07

## 2019-11-26 RX ORDER — PEN NEEDLE, DIABETIC
NEEDLE, DISPOSABLE MISCELLANEOUS
Qty: 100 EACH | Refills: 5 | Status: SHIPPED | OUTPATIENT
Start: 2019-11-26 | End: 2020-09-09

## 2020-02-06 ENCOUNTER — OFFICE VISIT (OUTPATIENT)
Dept: PRIMARY CARE CLINIC | Age: 65
End: 2020-02-06
Payer: MEDICARE

## 2020-02-06 ENCOUNTER — HOSPITAL ENCOUNTER (OUTPATIENT)
Facility: HOSPITAL | Age: 65
Discharge: HOME | End: 2020-02-06

## 2020-02-06 VITALS
DIASTOLIC BLOOD PRESSURE: 100 MMHG | OXYGEN SATURATION: 96 % | HEART RATE: 102 BPM | BODY MASS INDEX: 52.22 KG/M2 | SYSTOLIC BLOOD PRESSURE: 140 MMHG | WEIGHT: 315 LBS

## 2020-02-06 LAB
A/G RATIO: 1.7 (ref 0.8–2)
ALBUMIN SERPL-MCNC: 4.5 G/DL (ref 3.4–4.8)
ALP BLD-CCNC: 96 U/L (ref 25–100)
ALT SERPL-CCNC: 21 U/L (ref 4–36)
ANION GAP SERPL CALCULATED.3IONS-SCNC: 10 MMOL/L (ref 3–16)
AST SERPL-CCNC: 16 U/L (ref 8–33)
BILIRUB SERPL-MCNC: 0.5 MG/DL (ref 0.3–1.2)
BUN BLDV-MCNC: 17 MG/DL (ref 6–20)
CALCIUM SERPL-MCNC: 9.5 MG/DL (ref 8.5–10.5)
CHLORIDE BLD-SCNC: 102 MMOL/L (ref 98–107)
CHOLESTEROL, TOTAL: 144 MG/DL (ref 0–200)
CO2: 25 MMOL/L (ref 20–30)
CREAT SERPL-MCNC: 1.3 MG/DL (ref 0.4–1.2)
GFR AFRICAN AMERICAN: >59
GFR NON-AFRICAN AMERICAN: 55
GLOBULIN: 2.7 G/DL
GLUCOSE BLD-MCNC: 199 MG/DL (ref 74–106)
HBA1C MFR BLD: 8.2 %
HDLC SERPL-MCNC: 34 MG/DL (ref 40–60)
LDL CHOLESTEROL CALCULATED: 81 MG/DL
POTASSIUM SERPL-SCNC: 4.8 MMOL/L (ref 3.4–5.1)
SODIUM BLD-SCNC: 137 MMOL/L (ref 136–145)
TOTAL PROTEIN: 7.2 G/DL (ref 6.4–8.3)
TRIGL SERPL-MCNC: 144 MG/DL (ref 0–249)
TSH SERPL DL<=0.05 MIU/L-ACNC: 3.83 UIU/ML (ref 0.35–5.5)
VLDLC SERPL CALC-MCNC: 29 MG/DL

## 2020-02-06 PROCEDURE — 80061 LIPID PANEL: CPT

## 2020-02-06 PROCEDURE — G8417 CALC BMI ABV UP PARAM F/U: HCPCS | Performed by: NURSE PRACTITIONER

## 2020-02-06 PROCEDURE — 3052F HG A1C>EQUAL 8.0%<EQUAL 9.0%: CPT | Performed by: NURSE PRACTITIONER

## 2020-02-06 PROCEDURE — 84443 ASSAY THYROID STIM HORMONE: CPT

## 2020-02-06 PROCEDURE — 4040F PNEUMOC VAC/ADMIN/RCVD: CPT | Performed by: NURSE PRACTITIONER

## 2020-02-06 PROCEDURE — 1123F ACP DISCUSS/DSCN MKR DOCD: CPT | Performed by: NURSE PRACTITIONER

## 2020-02-06 PROCEDURE — 80053 COMPREHEN METABOLIC PANEL: CPT

## 2020-02-06 PROCEDURE — G8484 FLU IMMUNIZE NO ADMIN: HCPCS | Performed by: NURSE PRACTITIONER

## 2020-02-06 PROCEDURE — G8427 DOCREV CUR MEDS BY ELIG CLIN: HCPCS | Performed by: NURSE PRACTITIONER

## 2020-02-06 PROCEDURE — 83036 HEMOGLOBIN GLYCOSYLATED A1C: CPT

## 2020-02-06 PROCEDURE — 2022F DILAT RTA XM EVC RTNOPTHY: CPT | Performed by: NURSE PRACTITIONER

## 2020-02-06 PROCEDURE — 1036F TOBACCO NON-USER: CPT | Performed by: NURSE PRACTITIONER

## 2020-02-06 PROCEDURE — 3017F COLORECTAL CA SCREEN DOC REV: CPT | Performed by: NURSE PRACTITIONER

## 2020-02-06 PROCEDURE — 99213 OFFICE O/P EST LOW 20 MIN: CPT | Performed by: NURSE PRACTITIONER

## 2020-02-06 RX ORDER — TRAZODONE HYDROCHLORIDE 100 MG/1
100 TABLET ORAL NIGHTLY
Qty: 30 TABLET | Refills: 5 | Status: SHIPPED | OUTPATIENT
Start: 2020-02-06 | End: 2020-12-09

## 2020-02-06 ASSESSMENT — PATIENT HEALTH QUESTIONNAIRE - PHQ9
1. LITTLE INTEREST OR PLEASURE IN DOING THINGS: 0
2. FEELING DOWN, DEPRESSED OR HOPELESS: 0
SUM OF ALL RESPONSES TO PHQ QUESTIONS 1-9: 0
SUM OF ALL RESPONSES TO PHQ9 QUESTIONS 1 & 2: 0
SUM OF ALL RESPONSES TO PHQ QUESTIONS 1-9: 0

## 2020-02-06 ASSESSMENT — ENCOUNTER SYMPTOMS
SHORTNESS OF BREATH: 0
EYE PAIN: 0
SORE THROAT: 0
NAUSEA: 0
COUGH: 0
VOMITING: 0
ABDOMINAL PAIN: 0

## 2020-02-06 NOTE — PROGRESS NOTES
Chief Complaint   Patient presents with    Diabetes     Paient here today for a follow up. He would like to discuss his medications. He is fasting for lab work. Have you seen any other physician or provider since your last visit? no    Have you had any other diagnostic tests since your last visit? no    Have you changed or stopped any medications since your last visit including any over-the-counter medicines, vitamins, or herbal medicines? no     Are you taking all your prescribed medications? Yes  If NO, why? -  N/A    I have recommended that this patient have a flu shot,pneumonia, colonoscopy but he declines at this time. I have discussed the risks and benefits of this examination with him. The patient verbalizes understanding. REVIEW OF SYSTEMS:  Review of Systems   Constitutional: Negative for chills and fever. HENT: Negative for ear pain and sore throat. Eyes: Negative for pain and visual disturbance. Respiratory: Negative for cough and shortness of breath. Cardiovascular: Negative for chest pain, palpitations and leg swelling. Gastrointestinal: Negative for abdominal pain, nausea and vomiting. Genitourinary: Negative for dysuria and hematuria. Musculoskeletal: Negative for joint swelling. Skin: Negative for rash. Neurological: Negative for dizziness and weakness. Psychiatric/Behavioral: Negative for sleep disturbance.

## 2020-02-07 ENCOUNTER — TELEPHONE (OUTPATIENT)
Dept: PRIMARY CARE CLINIC | Age: 65
End: 2020-02-07

## 2020-02-07 NOTE — TELEPHONE ENCOUNTER
----- Message from TANESHA Maguire sent at 2/6/2020  4:43 PM EST -----  Also, I want him to stop by the office next week to have his BP checked.

## 2020-02-07 NOTE — LETTER
[facility]  [address] [place] [place] [address]  Phone: [phone]  Fax: [address], APRN        February 7, 2020    [address] [facility]      Dear Amol Washington:    We have received your recent lab results. Sugar is up. Kidney test is slightly elevated. Increase Lantus to 80u and watch diet. Also, stop by the office next week to have your blood pressure checked. If you have any questions or concerns, please don't hesitate to call.     Sincerely,        TANESHA Hutchins

## 2020-02-17 NOTE — PROGRESS NOTES
SUBJECTIVE:    Patient ID: Ced Valdez is a 75 y.o. male. Medical History Review  Past Medical, Family, and Social History reviewed and does contribute to the patient presenting condition    Health Maintenance Due   Topic Date Due    Hepatitis C screen  1955    Diabetic foot exam  04/03/1963    Diabetic retinal exam  04/03/1963    DTaP/Tdap/Td vaccine (1 - Tdap) 04/03/1964    Hepatitis B vaccine (1 of 3 - Risk 3-dose series) 04/03/1972    Shingles Vaccine (1 of 2) 04/03/2003    Pneumococcal 65+ years Vaccine (1 of 1 - PPSV23) 04/03/2018    Colon Cancer Screen FIT/FOBT  07/14/2018    Flu vaccine (1) 09/01/2019    Annual Wellness Visit (AWV)  02/11/2020       HPI:   Chief Complaint   Patient presents with    Diabetes     Paient here today for a follow up. He would like to discuss his medications. He is fasting for lab work. He has been feeling well, just having trouble sleeping. Patient's medications, allergies, past medical, surgical, social and family histories were reviewed and updated as appropriate. Review of Systems Reviewed and acurate. See MA note. OBJECTIVE:  BP (!) 140/100   Pulse 102   Wt (!) 385 lb (174.6 kg)   SpO2 96%   BMI 52.22 kg/m²    Physical Exam  Vitals signs reviewed. Constitutional:       General: He is not in acute distress. Appearance: He is well-developed. HENT:      Head: Normocephalic. Right Ear: Tympanic membrane normal.      Left Ear: Tympanic membrane normal.      Mouth/Throat:      Pharynx: No oropharyngeal exudate. Eyes:      General: Lids are normal.   Neck:      Musculoskeletal: Neck supple. Cardiovascular:      Rate and Rhythm: Normal rate and regular rhythm. Heart sounds: Normal heart sounds. Pulmonary:      Effort: Pulmonary effort is normal.      Breath sounds: Normal breath sounds. Abdominal:      General: Bowel sounds are normal. There is no distension. Palpations: Abdomen is soft. Tenderness:  There is no abdominal tenderness. Lymphadenopathy:      Cervical: No cervical adenopathy. Skin:     General: Skin is warm and dry. Neurological:      Mental Status: He is alert and oriented to person, place, and time. Results in Past 30 Days  Result Component Current Result Ref Range Previous Result Ref Range   Alb 4.5 (2/6/2020) 3.4 - 4.8 g/dL Not in Time Range    Albumin/Globulin Ratio 1.7 (2/6/2020) 0.8 - 2.0 Not in Time Range    Alkaline Phosphatase 96 (2/6/2020) 25 - 100 U/L Not in Time Range    ALT 21 (2/6/2020) 4 - 36 U/L Not in Time Range    AST 16 (2/6/2020) 8 - 33 U/L Not in Time Range    BUN 17 (2/6/2020) 6 - 20 mg/dL Not in Time Range    Calcium 9.5 (2/6/2020) 8.5 - 10.5 mg/dL Not in Time Range    Chloride 102 (2/6/2020) 98 - 107 mmol/L Not in Time Range    CO2 25 (2/6/2020) 20 - 30 mmol/L Not in Time Range    CREATININE 1.3 (H) (2/6/2020) 0.4 - 1.2 mg/dL Not in Time Range    GFR  >59 (2/6/2020) >59 Not in Time Range    GFR Non- 55 (L) (2/6/2020) >59 Not in Time Range    Globulin 2.7 (2/6/2020) g/dL Not in Time Range    Glucose 199 (H) (2/6/2020) 74 - 106 mg/dL Not in Time Range    Potassium 4.8 (2/6/2020) 3.4 - 5.1 mmol/L Not in Time Range    Sodium 137 (2/6/2020) 136 - 145 mmol/L Not in Time Range    Total Bilirubin 0.5 (2/6/2020) 0.3 - 1.2 mg/dL Not in Time Range    Total Protein 7.2 (2/6/2020) 6.4 - 8.3 g/dL Not in Time Range      Hemoglobin A1C (%)   Date Value   02/06/2020 8.2 (H)     Microscopic Examination (no units)   Date Value   06/28/2017 YES     LDL Calculated (mg/dL)   Date Value   02/06/2020 81       Lab Results   Component Value Date    WBC 9.9 03/06/2018    NEUTROABS 4.6 03/06/2018    HGB 15.9 03/06/2018    HCT 47.6 03/06/2018    MCV 88.0 03/06/2018     03/06/2018     Lab Results   Component Value Date    TSH 3.83 02/06/2020       Prior to Visit Medications    Medication Sig Taking?  Authorizing Provider   traZODone (DESYREL) 100 MG tablet Take 1 tablet by mouth nightly 340B Yes TANESHA Agarwal   UNIFINE PENTIPS 31G X 6 MM MISC USE TO INJECT INSULIN EVERY DAY Yes TANESHA Agarwal   omeprazole (PRILOSEC) 40 MG delayed release capsule Take 1 capsule by mouth daily. 340B Yes TANESHA Agarwal   lisinopril (PRINIVIL;ZESTRIL) 40 MG tablet Take 1 tablet by mouth daily 340B Yes TANESHA Agarwal   allopurinol (ZYLOPRIM) 100 MG tablet Take 1 tablet by mouth daily 340B Yes TANESHA Agarwal   levothyroxine (SYNTHROID) 75 MCG tablet TAKE 1 TABLET BY MOUTH ONCE DAILY. 340B Yes TANESHA Agarwal   linagliptin (TRADJENTA) 5 MG tablet Take 1 tablet by mouth daily 340B Yes TANESHA Agarwal   carvedilol (COREG) 12.5 MG tablet Take 1 tablet by mouth 2 times daily (with meals) 340B Yes TANESHA Agarwal   pravastatin (PRAVACHOL) 80 MG tablet TAKE 1 TABLET BY MOUTH EVERY EVENING 340B Yes TANESHA Agarwal   terazosin (HYTRIN) 5 MG capsule Take 1 capsule by mouth nightly 340B Yes TANESHA Agarwal   glipiZIDE (GLUCOTROL) 10 MG tablet TAKE 2 TABLETS BY MOUTH TWO TIMES A DAY 340B Yes TANESHA Agarwal   diclofenac sodium 1 % GEL Apply 4 g topically 4 times daily 340B Yes TANESHA Agarwal   meloxicam (MOBIC) 7.5 MG tablet Take 1 tablet by mouth daily 340B Yes TANESHA Agarwal   metolazone (ZAROXOLYN) 2.5 MG tablet Take 1 tablet by mouth every other day For swelling 340B Yes TANESHA Agarwal   Needles & Syringes MISC 1 each by Does not apply route daily u-100 insulin syringe Yes TANESHA Agarwal   FREESTYLE LANCETS MISC TEST TWO TIMES A DAY Yes TANESHA Agarwal   FREESTYLE LITE strip TEST BLOOD SUGAR TWO TIMES A DAY Yes TANESHA Agarwal   Respiratory Therapy Supplies PREMA by Does not apply route. Yes Brigitte Clements MD   Blood Glucose Monitoring Suppl (FREESTYLE LITE) PREMA by Does not apply route.  Yes Veena Anderson MD   insulin glargine (LANTUS SOLOSTAR) 100 UNIT/ML injection pen Inject 80 Units into the skin nightly Liseth Peterson, APRN   glucose blood VI test strips (FREESTYLE LITE) strip As needed. Dx: DM2  Liseth Peterson, APRN       ASSESSMENT:  1. Type 2 diabetes mellitus with stage 3 chronic kidney disease, without long-term current use of insulin ([facility] [address]    2. Hypothyroidism    3. Essential hypertension    4. Chronic kidney disease, stage III (moderate) ([facility])    5. Insomnia, unspecified type        PLAN:  Orders Placed This Encounter   Medications    traZODone (DESYREL) 100 MG tablet     Sig: Take 1 tablet by mouth nightly 340B     Dispense:  30 tablet     Refill:  5       Patient Instructions   Switch Amitriptyline to Trazodone for sleep. Return in about 3 months (around 5/6/2020).

## 2020-02-20 RX ORDER — PRAVASTATIN SODIUM 80 MG/1
TABLET ORAL
Qty: 90 TABLET | Refills: 3 | Status: SHIPPED | OUTPATIENT
Start: 2020-02-20 | End: 2021-01-05 | Stop reason: SDUPTHER

## 2020-02-27 RX ORDER — LISINOPRIL 40 MG/1
TABLET ORAL
Qty: 100 TABLET | Refills: 3 | Status: SHIPPED | OUTPATIENT
Start: 2020-02-27 | End: 2021-01-05 | Stop reason: SDUPTHER

## 2020-02-27 RX ORDER — GLIPIZIDE 10 MG/1
TABLET ORAL
Qty: 400 TABLET | Refills: 3 | Status: SHIPPED | OUTPATIENT
Start: 2020-02-27 | End: 2021-01-05 | Stop reason: SDUPTHER

## 2020-02-27 RX ORDER — CARVEDILOL 12.5 MG/1
TABLET ORAL
Qty: 200 TABLET | Refills: 3 | Status: SHIPPED | OUTPATIENT
Start: 2020-02-27 | End: 2020-08-20 | Stop reason: SDUPTHER

## 2020-02-27 RX ORDER — MELOXICAM 7.5 MG/1
TABLET ORAL
Qty: 100 TABLET | Refills: 3 | OUTPATIENT
Start: 2020-02-27

## 2020-02-27 NOTE — TELEPHONE ENCOUNTER
Called patient per [facility] and told his wife Amaya Morris to be sure he knows not to take Maloxicam. She verbally understood.

## 2020-03-27 ENCOUNTER — TELEPHONE (OUTPATIENT)
Dept: PRIMARY CARE CLINIC | Age: 65
End: 2020-03-27

## 2020-03-27 RX ORDER — CEFDINIR 300 MG/1
300 CAPSULE ORAL 2 TIMES DAILY
Qty: 20 CAPSULE | Refills: 0 | Status: SHIPPED | OUTPATIENT
Start: 2020-03-27 | End: 2020-04-06

## 2020-03-27 RX ORDER — BENZONATATE 200 MG/1
200 CAPSULE ORAL 3 TIMES DAILY PRN
Qty: 30 CAPSULE | Refills: 0 | Status: SHIPPED | OUTPATIENT
Start: 2020-03-27 | End: 2020-04-03

## 2020-03-27 NOTE — TELEPHONE ENCOUNTER
Patient called states he has a hacking cough, he is wanting something for it. He wanted a zpack told him they probably wouldn't give him that he would like anything but liquid.

## 2020-05-06 ENCOUNTER — VIRTUAL VISIT (OUTPATIENT)
Dept: PRIMARY CARE CLINIC | Age: 65
End: 2020-05-06
Payer: MEDICARE

## 2020-05-06 PROCEDURE — G2025 DIS SITE TELE SVCS RHC/FQHC: HCPCS | Performed by: NURSE PRACTITIONER

## 2020-05-13 RX ORDER — LINAGLIPTIN 5 MG/1
TABLET, FILM COATED ORAL
Qty: 30 TABLET | Refills: 5 | Status: SHIPPED | OUTPATIENT
Start: 2020-05-13 | End: 2020-05-18

## 2020-05-18 ENCOUNTER — HOSPITAL ENCOUNTER (EMERGENCY)
Facility: HOSPITAL | Age: 65
Discharge: HOME | End: 2020-05-18
Attending: HOSPITALIST

## 2020-05-18 VITALS
DIASTOLIC BLOOD PRESSURE: 105 MMHG | OXYGEN SATURATION: 93 % | RESPIRATION RATE: 18 BRPM | SYSTOLIC BLOOD PRESSURE: 174 MMHG | HEIGHT: 72 IN | HEART RATE: 92 BPM | BODY MASS INDEX: 42.66 KG/M2 | WEIGHT: 315 LBS | TEMPERATURE: 98.2 F

## 2020-05-18 LAB
BILIRUBIN URINE: NEGATIVE
BLOOD, URINE: ABNORMAL
CLARITY: CLEAR
COLOR: YELLOW
EPITHELIAL CELLS, UA: ABNORMAL /HPF (ref 0–5)
GLUCOSE URINE: 500 MG/DL
KETONES, URINE: NEGATIVE MG/DL
LEUKOCYTE ESTERASE, URINE: ABNORMAL
MICROSCOPIC EXAMINATION: YES
MUCUS: ABNORMAL /LPF
NITRITE, URINE: NEGATIVE
PH UA: 5 (ref 5–8)
PROTEIN UA: 100 MG/DL
RBC UA: ABNORMAL /HPF (ref 0–4)
SPECIFIC GRAVITY UA: 1.02 (ref 1–1.03)
URINE REFLEX TO CULTURE: ABNORMAL
URINE TYPE: ABNORMAL
UROBILINOGEN, URINE: 0.2 E.U./DL
WBC UA: ABNORMAL /HPF (ref 0–5)

## 2020-05-18 PROCEDURE — 81001 URINALYSIS AUTO W/SCOPE: CPT

## 2020-05-18 PROCEDURE — 99283 EMERGENCY DEPT VISIT LOW MDM: CPT

## 2020-05-18 RX ORDER — LINAGLIPTIN 5 MG/1
TABLET, FILM COATED ORAL
Qty: 90 TABLET | Refills: 3 | Status: SHIPPED | OUTPATIENT
Start: 2020-05-18 | End: 2021-01-05 | Stop reason: SDUPTHER

## 2020-05-18 RX ORDER — CYCLOBENZAPRINE HCL 10 MG
10 TABLET ORAL 3 TIMES DAILY PRN
Qty: 20 TABLET | Refills: 0 | Status: SHIPPED | OUTPATIENT
Start: 2020-05-18 | End: 2020-05-28

## 2020-05-18 ASSESSMENT — PAIN SCALES - GENERAL: PAINLEVEL_OUTOF10: 3

## 2020-05-18 ASSESSMENT — PAIN DESCRIPTION - PAIN TYPE: TYPE: ACUTE PAIN

## 2020-05-18 ASSESSMENT — PAIN DESCRIPTION - LOCATION: LOCATION: BACK

## 2020-05-18 ASSESSMENT — PAIN DESCRIPTION - ORIENTATION: ORIENTATION: LEFT;RIGHT

## 2020-05-18 NOTE — ED NOTES
Pt given a urinal and call light. Advised on how to call out when finished voiding.       Klaus Talley RN  05/18/20 [id number]

## 2020-05-18 NOTE — ED PROVIDER NOTES
breath, no cough, no wheezing  Gastrointestinal:  No pain, no nausea, no vomiting, no diarrhea  Musculoskeletal:  +low back pain, no joint pain  Skin:  No rash, no easy bruising, +forehead abrasion/laceration  Neurologic:  No speech problems, no headache, no extremity weakness  Psychiatric:  No anxiety  Genitourinary:  No dysuria, no hematuria    Except as noted above the remainder of the review of systems was reviewed and negative. PAST MEDICAL HISTORY     Past Medical History:   Diagnosis Date    Chronic back pain     feet,hands,legs,knees    Chronic kidney disease     Stage III    DDD (degenerative disc disease), lumbar     GERD (gastroesophageal reflux disease)     Hypercholesteremia     Hypertension     Left lumbar radiculopathy     Obesity     Type II or unspecified type diabetes mellitus without mention of complication, not stated as uncontrolled 2011    Unspecified sleep apnea          SURGICAL HISTORY     History reviewed. No pertinent surgical history. CURRENT MEDICATIONS       Previous Medications    ALLOPURINOL (ZYLOPRIM) 100 MG TABLET    Take 1 tablet by mouth daily 340B    BLOOD GLUCOSE MONITORING SUPPL (FREESTYLE LITE) PREMA    by Does not apply route. CARVEDILOL (COREG) 12.5 MG TABLET    TAKE ONE TABLET BY MOUTH TWO TIMES A DAY    DICLOFENAC SODIUM 1 % GEL    Apply 4 g topically 4 times daily 340B    FREESTYLE LANCETS MISC    TEST TWO TIMES A DAY    FREESTYLE LITE STRIP    TEST BLOOD SUGAR TWO TIMES A DAY    GLIPIZIDE (GLUCOTROL) 10 MG TABLET    TAKE TWO TABLETS BY MOUTH TWO TIMES A DAY    GLUCOSE BLOOD VI TEST STRIPS (FREESTYLE LITE) STRIP    As needed. Dx: DM2    INSULIN GLARGINE (LANTUS SOLOSTAR) 100 UNIT/ML INJECTION PEN    Inject 80 Units into the skin nightly    LEVOTHYROXINE (SYNTHROID) 75 MCG TABLET    TAKE 1 TABLET BY MOUTH ONCE DAILY.  340B    LISINOPRIL (PRINIVIL;ZESTRIL) 40 MG TABLET    TAKE 1 TABLET BY MOUTH DAILY    METOLAZONE (ZAROXOLYN) 2.5 MG TABLET    Take 1 None   Social History Narrative    None         PHYSICAL EXAM    (up to 7 for level 4, 8 or more for level 5)     ED Triage Vitals [05/18/20 1328]   BP Temp Temp src Pulse Resp SpO2 Height Weight   (!) 180/110 98.2 °F (36.8 °C) -- 95 18 92 % 6' (1.829 m) (!) 325 lb (147.4 kg)       Physical Exam  General :Patient is awake, alert, oriented, in no acute distress, nontoxic appearing  HEENT: Pupils are equally round and reactive to light, EOMI, conjunctivae clear. Oral mucosa is moist, no exudate. Uvula is midline  Neck: Neck is supple, full range of motion, trachea midline, no midline bony tenderness  Cardiac: Heart regular rate, rhythm, no murmurs, rubs, or gallops  Lungs: Lungs are clear to auscultation, there is no wheezing, rhonchi, or rales. There is no use of accessory muscles. Chest wall: There is no tenderness to palpation over the chest wall or over ribs  Abdomen: Abdomen is soft, nontender, nondistended. There is no firm or pulsatile masses, no rebound rigidity or guarding. Musculoskeletal: 5 out of 5 strength in all 4 extremities. No focal muscle deficits are appreciated  Neuro: Motor intact, sensory intact, level of consciousness is normal, cerebellar function is normal, reflexes are grossly normal. No evidence of incontinence or loss of bowel or bladder function, no saddle anesthesia noted   Dermatology: Skin is warm and dry, small abrasion/superficial laceration to the forehead measuring approximately 2.5 cm bleeding controlled no closure required. Psych: Mentation is grossly normal, cognition is grossly normal. Affect is appropriate. BACK: There is not thoracic or lumbar midline tenderness to palpation or step-offs. Paraspinal tenderness to palpation is  present in the bilateral lumbar region. No overlying rashes. LE strength is 5/5. LE light touch is intact. LE DTR's are 2+ in the patellas and achilles. Straight leg test is negative on the RIGHT, negative on the LEFT.       DIAGNOSTIC RESULTS patient about the upcoming plan, treatment possible disposition which she was agreeable to the time of this dictation. Patient advised that since he did not have any loss of consciousness secondary to the roth of the lawnmower hitting him across the forehead causing a slight superficial laceration/abrasion which does not require any closure with bleeding controlled upon arrival we do not need to CT scan his head. Patient states he is not on any blood thinners. He is alert rate and oriented he has had no signs or symptoms of concussion. Patient complaining of some low back pain but did not have any palpable tenderness on the vertebrae themselves. Advised that since his pain is more paraspinal we do not need to perform any radiograph or CT scan of the low back since he is not have any bony tenderness and he is agreeable to this. Advised that we would check a urine just to make sure that he does not have a large amount or gross hematuria of blood which would prompt us to CT scan his abdomen for possible renal pathology. Patient's agreeable to this plan. He was offered something for pain but states he does not need it he only rates it is a 3 out of 10. Patient's final disposition be determined once his diagnostic studies been performed reviewed. UA trace lysed blood, small leukoesterase, 6-9 white cells, 2-5 epithelial cells and 3-4 red cells. Patient is not have any signs or symptoms of urinary tract infection so we will not treat at this time did not meet criteria for culture. Patient will be discharged home in stable condition. Advised that we would write him a prescription for some Flexeril muscle relaxer for the aches and pain that he is probably going to have tomorrow. Advised to drink plenty of fluids. Advised use heating pad to the area to see if this helps with some of his discomfort which he is going to develop over the next several hours after the incident.   Patient does state his understanding is agreeable this. Advised use Tylenol or Motrin for pain. Patient be discharged home in stable condition. Patient's blood pressure is elevated here in the department but he states that he has been following that and writing it down and sees Dr. José Jiang for this. He states he is actually supposed to contact her tomorrow since she is not in the office today to discuss his blood pressure with her. No medications or intervention was made here since he is a symptomatic with his a blood pressure. The patient advised that he does need to follow-up with his regular family physician within the next 1 to 2 days reevaluation. Patient was also given instructions that if symptoms worsens or new symptoms arise he should return back to the emergency department for further evaluation work-up. CONSULTS:  None    PROCEDURES:  Procedures    CRITICAL CARE TIME    Total Critical Care time was 0 minutes, excluding separately reportable procedures. There was a high probability of clinically significant/life threatening deterioration in the patient's condition which required my urgent intervention. FINAL IMPRESSION      1. Accident caused by powered , initial encounter    2. Acute bilateral low back pain without sciatica    3. Superficial laceration of face          DISPOSITION/PLAN   DISPOSITION        PATIENT REFERRED TO:  Andrew Keller, APRUBALDO  [address]  [phone]    In 2 days      [facility]  [facility] [address]   [facility]  [phone]    As needed, If symptoms worsen      DISCHARGE MEDICATIONS:  New Prescriptions    CYCLOBENZAPRINE (FLEXERIL) 10 MG TABLET    Take 1 tablet by mouth 3 times daily as needed for Muscle spasms       Comment: Please note this report has been produced using speech recognition software and may contain errorsrelated to that system including errors in grammar, punctuation, and spelling, as well as

## 2020-05-18 NOTE — ED NOTES
DC instruction reviewed with Pt , verbalized understanding. Pt  denies any further questions or needs at this time. Prescription(s) and Written instruction given to Pt. No distress noted on DC. Pt refused WC, Pt ambulated without difficulty out of ED, with  belongings.         Lori Baker RN  05/18/20 8638

## 2020-05-25 ASSESSMENT — ENCOUNTER SYMPTOMS
SHORTNESS OF BREATH: 0
NAUSEA: 0
EYE PAIN: 0
VOMITING: 0
SORE THROAT: 0
ABDOMINAL PAIN: 0
COUGH: 0

## 2020-05-26 NOTE — PROGRESS NOTES
MG capsule Take 1 capsule by mouth nightly 340B  TANESHA Quinteros   diclofenac sodium 1 % GEL Apply 4 g topically 4 times daily 340B  TANESHA Quinteros   metolazone (ZAROXOLYN) 2.5 MG tablet Take 1 tablet by mouth every other day For swelling 340B  TANESHA Quinteros   [address] 1 each by Does not apply route daily u-100 insulin syringe  TANESHA Quinteros   FREESTYLE LANCETS MISC TEST TWO TIMES A DAY  TANESHA Quinteros   FREESTYLE LITE strip TEST BLOOD SUGAR TWO TIMES A DAY  TANESHA Quinteros   Respiratory Therapy Supplies PREMA by Does not apply route. Edison Soto MD   glucose blood VI test strips (FREESTYLE LITE) strip As needed. Dx: DM2  TANESHA Quinteros   Blood Glucose Monitoring Suppl (FREESTYLE LITE) PREMA by Does not apply route. Daniela Osuna MD       ASSESSMENT:  1. Type 2 diabetes mellitus with stage 3 chronic kidney disease, without long-term current use of insulin ([facility] [address]    2. Essential hypertension    3. Chronic kidney disease, stage III (moderate) (HCC)    4. Localized edema        PLAN:  Monitor BP QD. Call if >140 consistently. Fasting labs prior to f/u. Return in about 3 months (around 8/6/2020). I affirm this is a Patient Initiated Episode with an Established Patient who has not had a related appointment within my department in the past 7 days or scheduled within the next 24 hours.     Total Time: minutes: 5-10 minutes    Note: not billable if this call serves to triage the patient into an appointment for the relevant concern      Jamil Brown

## 2020-05-27 RX ORDER — INSULIN GLARGINE 100 [IU]/ML
INJECTION, SOLUTION SUBCUTANEOUS
Qty: 30 ML | Refills: 3 | Status: SHIPPED | OUTPATIENT
Start: 2020-05-27 | End: 2020-08-20 | Stop reason: SDUPTHER

## 2020-06-11 RX ORDER — AMITRIPTYLINE HYDROCHLORIDE 50 MG/1
TABLET, FILM COATED ORAL
Qty: 100 TABLET | Refills: 3 | OUTPATIENT
Start: 2020-06-11

## 2020-06-25 RX ORDER — TERAZOSIN 5 MG/1
5 CAPSULE ORAL NIGHTLY
Qty: 100 CAPSULE | Refills: 3 | Status: SHIPPED | OUTPATIENT
Start: 2020-06-25 | End: 2021-01-05 | Stop reason: SDUPTHER

## 2020-06-25 RX ORDER — OMEPRAZOLE 40 MG/1
CAPSULE, DELAYED RELEASE ORAL
Qty: 90 CAPSULE | Refills: 3 | Status: SHIPPED | OUTPATIENT
Start: 2020-06-25 | End: 2021-01-05 | Stop reason: SDUPTHER

## 2020-08-19 NOTE — PATIENT INSTRUCTIONS
· Keep a list of your medicines with you. List all of the prescription medicines, nonprescription medicines, supplements, natural remedies, and vitamins that you take. Tell your healthcare providers who treat you about all of the products you are taking. Your provider can provide you with a form to keep track of them. Just ask. · Follow the directions that come with your medicine, including information about food or alcohol. Make sure you know how and when to take your medicine. Do not take more or less than you are supposed to take. · Keep all medicines out of the reach of children. · Store medicines according to the directions on the label. · Monitor yourself. Learn to know how your body reacts to your new medicine and keep track of how it makes you feel before attempting (If your provider has allowed you to do so) to drive or go to work. · Seek emergency medical attention if you think you have used too much of this medicine. An overdose of any prescription medicine can be fatal. Overdose symptoms may include extreme drowsiness, muscle weakness, confusion, cold and clammy skin, pinpoint pupils, shallow breathing, slow heart rate, fainting, or coma. · Don't share prescription medicines with others, even when they seem to have the same symptoms. What may be good for you may be harmful to others. · If you are no longer taking a prescribed medication and you have pills left please take your pills out of their original containers. Mix crushed pills with an undesirable substance, such as cat litter or used coffee grounds. Put the mixture into a disposable container with a lid, such as an empty margarine tub, or into a sealable bag. Cover up or remove any of your personal information on the empty containers by covering it with black permanent marker or duct tape. Place the sealed container with the mixture, and the empty drug containers, in the trash.    · If you use a medication that is in the form of a patch, dispose of used patches by folding them in half so that the sticky sides meet, and then flushing them down a toilet. They should not be placed in the household trash where children or pets can find them. · If you have any questions, ask your provider or pharmacist for more information. · Be sure to keep all appointments for provider visits or tests. We are committed to providing you with the best care possible. In order to help us achieve these goals please remember to bring all medications, herbal products, and over the counter supplements with you to each visit. If your provider has ordered testing for you, please be sure to follow up with our office if you have not received results within 7 days after the testing took place. *If you receive a survey after visiting one of our offices, please take time to share your experience concerning your physician office visit. These surveys are confidential and no health information about you is shared. We are eager to improve for you and we are counting on your feedback to help make that happen. Thank you for requesting your Continuity of Care Document (CCD) electronically. Please follow the instructions below to securely access your online medical record. [employer] allows you to send messages to your doctor, view your test results, renew your prescriptions, schedule appointments, and more. How Do I Access my CCD? In your Internet browser, go to [email or web address] Enter your user name and password   Click on My medical Record  --> Download Summary --> Enter Password --> Download --> Save or Open Document    Additional Information  If you have questions, please contact your physician practice where you receive care. Remember, [employer] is NOT to be used for urgent needs. For medical emergencies, dial 911. Increase Cavedilol to 25mg twice a day. Change insulin shots to 45u twice a day.

## 2020-08-19 NOTE — PROGRESS NOTES
Chief Complaint   Patient presents with    Diabetes    Gastroesophageal Reflux    Hypertension    Pain       Have you seen any other physician or provider since your last visit yes - [facility]    Have you had any other diagnostic tests since your last visit? yes -     Have you changed or stopped any medications since your last visit? no     Review of Systems   Constitutional: Negative for chills, fatigue and fever. HENT: Negative for congestion, ear pain, rhinorrhea and sore throat. Eyes: Negative for discharge, redness and itching. Respiratory: Negative for cough and shortness of breath. Cardiovascular: Negative for chest pain, palpitations and leg swelling. Gastrointestinal: Negative for abdominal pain, constipation, diarrhea, nausea and vomiting. Endocrine: Negative for cold intolerance and heat intolerance. Genitourinary: Negative for dysuria. Musculoskeletal: Negative for arthralgias and joint swelling. Skin: Negative for rash and wound. Neurological: Negative for weakness and headaches. Hematological: Negative for adenopathy. Psychiatric/Behavioral: Negative for dysphoric mood and sleep disturbance. The patient is not nervous/anxious. Health Maintenance Due This Visit   Colonoscopy Yes- cologuard   Mammogram No   Annual Wellness Visit No   Microalbumin No   HgbA1C No   Diabetic Eye Exam Yes- no eye exam. Will sched to [facility]. [employer] Due This Visit   WEN No   UDS No   Contract No          Pneumococcal Information Sheet, \"Prevnar 13/Pneumovax 23\" given to [facility]. Patient/Legal guardian of [employer] verbalized understanding. Patient Responses    Have you had a pneumonia shot before? No  If so, when? Have you ever had an allergic reaction to a vaccine? No  Do you feel ill or have a fever today? No  Are you currently taking an antibiotic? No    Pneumococcal vaccine given per order. Please see immunization tab.     Risks and benefits explained. Current VIS given. Immunizations Administered     Name Date Dose Route    Pneumococcal Polysaccharide ([id number]) 8/20/2020 0.5 mL Intramuscular    Site: Deltoid- Left    Lot: [id number]    NDC: 5433-6666-03        Pt stayed in exam room x 20 mins after imm.

## 2020-08-20 ENCOUNTER — OFFICE VISIT (OUTPATIENT)
Dept: PRIMARY CARE CLINIC | Age: 65
End: 2020-08-20
Payer: MEDICARE

## 2020-08-20 VITALS
OXYGEN SATURATION: 98 % | WEIGHT: 315 LBS | HEART RATE: 73 BPM | RESPIRATION RATE: 16 BRPM | DIASTOLIC BLOOD PRESSURE: 90 MMHG | HEIGHT: 72 IN | SYSTOLIC BLOOD PRESSURE: 156 MMHG | TEMPERATURE: 99 F | BODY MASS INDEX: 42.66 KG/M2

## 2020-08-20 LAB — HBA1C MFR BLD: 11.3 %

## 2020-08-20 PROCEDURE — 2022F DILAT RTA XM EVC RTNOPTHY: CPT | Performed by: NURSE PRACTITIONER

## 2020-08-20 PROCEDURE — 90732 PPSV23 VACC 2 YRS+ SUBQ/IM: CPT | Performed by: NURSE PRACTITIONER

## 2020-08-20 PROCEDURE — G0009 ADMIN PNEUMOCOCCAL VACCINE: HCPCS | Performed by: NURSE PRACTITIONER

## 2020-08-20 PROCEDURE — 83036 HEMOGLOBIN GLYCOSYLATED A1C: CPT | Performed by: NURSE PRACTITIONER

## 2020-08-20 PROCEDURE — 1123F ACP DISCUSS/DSCN MKR DOCD: CPT | Performed by: NURSE PRACTITIONER

## 2020-08-20 PROCEDURE — 4040F PNEUMOC VAC/ADMIN/RCVD: CPT | Performed by: NURSE PRACTITIONER

## 2020-08-20 PROCEDURE — G8427 DOCREV CUR MEDS BY ELIG CLIN: HCPCS | Performed by: NURSE PRACTITIONER

## 2020-08-20 PROCEDURE — G8417 CALC BMI ABV UP PARAM F/U: HCPCS | Performed by: NURSE PRACTITIONER

## 2020-08-20 PROCEDURE — 99214 OFFICE O/P EST MOD 30 MIN: CPT | Performed by: NURSE PRACTITIONER

## 2020-08-20 PROCEDURE — 3046F HEMOGLOBIN A1C LEVEL >9.0%: CPT | Performed by: NURSE PRACTITIONER

## 2020-08-20 PROCEDURE — 1036F TOBACCO NON-USER: CPT | Performed by: NURSE PRACTITIONER

## 2020-08-20 PROCEDURE — 3017F COLORECTAL CA SCREEN DOC REV: CPT | Performed by: NURSE PRACTITIONER

## 2020-08-20 RX ORDER — CARVEDILOL 25 MG/1
25 TABLET ORAL 2 TIMES DAILY
Qty: 180 TABLET | Refills: 3 | Status: SHIPPED | OUTPATIENT
Start: 2020-08-20 | End: 2021-01-05 | Stop reason: SDUPTHER

## 2020-08-20 RX ORDER — INSULIN GLARGINE 100 [IU]/ML
45 INJECTION, SOLUTION SUBCUTANEOUS 2 TIMES DAILY
Qty: 30 ML | Refills: 3 | Status: SHIPPED | OUTPATIENT
Start: 2020-08-20 | End: 2020-10-27 | Stop reason: DRUGHIGH

## 2020-08-20 ASSESSMENT — ENCOUNTER SYMPTOMS
NAUSEA: 0
EYE DISCHARGE: 0
ABDOMINAL PAIN: 0
CONSTIPATION: 0
EYE ITCHING: 0
VOMITING: 0
SHORTNESS OF BREATH: 0
SORE THROAT: 0
EYE REDNESS: 0
DIARRHEA: 0
RHINORRHEA: 0
COUGH: 0

## 2020-08-20 NOTE — PROGRESS NOTES
SUBJECTIVE:    Patient ID: Maye Armando is a 77 y.o. male. Medical History Review  Past Medical, Family, and Social History reviewed and does contribute to the patient presenting condition    Health Maintenance Due   Topic Date Due    Hepatitis C screen  1955    Diabetic foot exam  04/03/1963    Diabetic retinal exam  04/03/1963    DTaP/Tdap/Td vaccine (1 - Tdap) 04/03/1972    Shingles Vaccine (1 of 2) 04/03/2003    Colon Cancer Screen FIT/FOBT  07/14/2018    Annual Wellness Visit (AWV)  02/11/2020    Flu vaccine (1) 09/01/2020       HPI:   Chief Complaint   Patient presents with    Diabetes    Gastroesophageal Reflux    Hypertension    Pain   His BP has been high. His sugar has been below 200. He is taking 80u of insulin nightly. He has been feeling fine. Patient's medications, allergies, past medical, surgical, social and family histories were reviewed and updated as appropriate. Review of Systems Reviewed and acurate. See MA note. OBJECTIVE:  BP (!) 156/90 (Site: Right Lower Arm, Position: Sitting)   Pulse 73   Temp 99 °F (37.2 °C) (Temporal)   Resp 16   Ht 6' (1.829 m)   Wt (!) 376 lb (170.6 kg)   SpO2 98% Comment: room air  BMI 50.99 kg/m²    Physical Exam  Vitals signs reviewed. Constitutional:       General: He is not in acute distress. Appearance: He is well-developed. HENT:      Head: Normocephalic. Right Ear: Tympanic membrane normal.      Left Ear: Tympanic membrane normal.      Mouth/Throat:      Pharynx: No oropharyngeal exudate. Eyes:      General: Lids are normal.   Neck:      Musculoskeletal: Neck supple. Cardiovascular:      Rate and Rhythm: Normal rate and regular rhythm. Heart sounds: Normal heart sounds. Pulmonary:      Effort: Pulmonary effort is normal.      Breath sounds: Normal breath sounds. Abdominal:      General: Bowel sounds are normal. There is no distension. Palpations: Abdomen is soft. Tenderness:  There is no abdominal tenderness. Lymphadenopathy:      Cervical: No cervical adenopathy. Skin:     General: Skin is warm and dry. Neurological:      Mental Status: He is alert and oriented to person, place, and time. No results found for requested labs within last 30 days. Hemoglobin A1C (%)   Date Value   08/20/2020 11.3     Microscopic Examination (no units)   Date Value   05/18/2020 YES     LDL Calculated (mg/dL)   Date Value   02/06/2020 81       Lab Results   Component Value Date    WBC 9.9 03/06/2018    NEUTROABS 4.6 03/06/2018    HGB 15.9 03/06/2018    HCT 47.6 03/06/2018    MCV 88.0 03/06/2018     03/06/2018     Lab Results   Component Value Date    TSH 3.83 02/06/2020       Prior to Visit Medications    Medication Sig Taking? Authorizing Provider   carvedilol (COREG) 25 MG tablet Take 1 tablet by mouth 2 times daily Yes TANESHA Wilkerson   insulin glargine (LANTUS SOLOSTAR) 100 UNIT/ML injection pen Inject 45 Units into the skin 2 times daily Yes KimballTANESHA Cross   levothyroxine (SYNTHROID) 75 MCG tablet TAKE ONE TABLET BY MOUTH EVERY DAY Yes Ashli TANESHA Montgomery   terazosin (HYTRIN) 5 MG capsule Take 1 capsule by mouth nightly 340B Yes Kimball TANESHA Montgomery   omeprazole (PRILOSEC) 40 MG delayed release capsule Take 1 capsule by mouth daily.  340B Yes Ashli TANESHA Montgomery   TRADJENTA 5 MG tablet TAKE 1 TABLET BY MOUTH DAILY Yes Kimball TANESHA Montgomery   glipiZIDE (GLUCOTROL) 10 MG tablet TAKE TWO TABLETS BY MOUTH TWO TIMES A DAY Yes Ashli TANESHA Montgomery   lisinopril (PRINIVIL;ZESTRIL) 40 MG tablet TAKE 1 TABLET BY MOUTH DAILY Yes Kimball TANESHA Montgomery   pravastatin (PRAVACHOL) 80 MG tablet TAKE 1 TABLET BY MOUTH EVERY EVENING Yes KimballTANESHA Cross   traZODone (DESYREL) 100 MG tablet Take 1 tablet by mouth nightly 340B Yes Ashli TANESHA Montgomery   UNIFINE PENTIPS 31G X 6 MM MISC USE TO INJECT INSULIN EVERY DAY Yes Kimball TANESHA Montgomery   allopurinol (ZYLOPRIM) 100 MG tablet Take 1 tablet by mouth daily 340B Yes TANESHA Brown   diclofenac sodium 1 % GEL Apply 4 g topically 4 times daily 340B Yes TANESHA Brown   metolazone (ZAROXOLYN) 2.5 MG tablet Take 1 tablet by mouth every other day For swelling 340B Yes TANESHA Brown   Needles & Syringes MISC 1 each by Does not apply route daily u-100 insulin syringe Yes [place]TANESHA Khalil   FREESTYLE LANCETS MISC TEST TWO TIMES A DAY Yes [place]ANESHA Khalil   FREESTYLE LITE strip TEST BLOOD SUGAR TWO TIMES A DAY Yes TANESHA Brown   Respiratory Therapy Supplies PREMA by Does not apply route. Yes Myriam Sanford MD   glucose blood VI test strips (FREESTYLE LITE) strip As needed. Dx: DM2 Yes TANESHA Brown   Blood Glucose Monitoring Suppl (FREESTYLE LITE) PREMA by Does not apply route. Yes Benji Bragg MD       ASSESSMENT:  1. Type 2 diabetes mellitus with stage 3 chronic kidney disease, without long-term current use of insulin ([facility] [address]    2. Hypothyroidism, unspecified type    3. Essential hypertension    4. Chronic kidney disease, stage III (moderate) (HCC)    5. Need for pneumococcal vaccination    6. Colon cancer screening        PLAN:  Orders Placed This Encounter   Medications    carvedilol (COREG) 25 MG tablet     Sig: Take 1 tablet by mouth 2 times daily     Dispense:  180 tablet     Refill:  3    insulin glargine (LANTUS SOLOSTAR) 100 UNIT/ML injection pen     Sig: Inject 45 Units into the skin 2 times daily     Dispense:  30 mL     Refill:  3     Orders Placed This Encounter   Procedures    Cologuard    Pneumococcal polysaccharide vaccine 23-valent greater than or equal to 1yo subcutaneous/IM    POCT glycosylated hemoglobin (Hb A1C)     Patient Instructions   · Keep a list of your medicines with you. List all of the prescription medicines, nonprescription medicines, supplements, natural remedies, and vitamins that you take. Tell your healthcare providers who treat you about all of the products you are taking.  Your provider can provide you with a form to keep track of them. Just ask. · Follow the directions that come with your medicine, including information about food or alcohol. Make sure you know how and when to take your medicine. Do not take more or less than you are supposed to take. · Keep all medicines out of the reach of children. · Store medicines according to the directions on the label. · Monitor yourself. Learn to know how your body reacts to your new medicine and keep track of how it makes you feel before attempting (If your provider has allowed you to do so) to drive or go to work. · Seek emergency medical attention if you think you have used too much of this medicine. An overdose of any prescription medicine can be fatal. Overdose symptoms may include extreme drowsiness, muscle weakness, confusion, cold and clammy skin, pinpoint pupils, shallow breathing, slow heart rate, fainting, or coma. · Don't share prescription medicines with others, even when they seem to have the same symptoms. What may be good for you may be harmful to others. · If you are no longer taking a prescribed medication and you have pills left please take your pills out of their original containers. Mix crushed pills with an undesirable substance, such as cat litter or used coffee grounds. Put the mixture into a disposable container with a lid, such as an empty margarine tub, or into a sealable bag. Cover up or remove any of your personal information on the empty containers by covering it with black permanent marker or duct tape. Place the sealed container with the mixture, and the empty drug containers, in the trash. · If you use a medication that is in the form of a patch, dispose of used patches by folding them in half so that the sticky sides meet, and then flushing them down a toilet. They should not be placed in the household trash where children or pets can find them.   · If you have any questions, ask your provider or pharmacist for more information. · Be sure to keep all appointments for provider visits or tests. We are committed to providing you with the best care possible. In order to help us achieve these goals please remember to bring all medications, herbal products, and over the counter supplements with you to each visit. If your provider has ordered testing for you, please be sure to follow up with our office if you have not received results within 7 days after the testing took place. *If you receive a survey after visiting one of our offices, please take time to share your experience concerning your physician office visit. These surveys are confidential and no health information about you is shared. We are eager to improve for you and we are counting on your feedback to help make that happen. Thank you for requesting your Continuity of Care Document (CCD) electronically. Please follow the instructions below to securely access your online medical record. [employer] allows you to send messages to your doctor, view your test results, renew your prescriptions, schedule appointments, and more. How Do I Access my CCD? In your Internet browser, go to [email or web address] Enter your user name and password   Click on My medical Record  --> Download Summary --> Enter Password --> Download --> Save or Open Document    Additional Information  If you have questions, please contact your physician practice where you receive care. Remember, [employer] is NOT to be used for urgent needs. For medical emergencies, dial 911. Increase Cavedilol to 25mg twice a day. Change insulin shots to 45u twice a day. F/U 3 months.

## 2020-09-09 RX ORDER — PEN NEEDLE, DIABETIC
NEEDLE, DISPOSABLE MISCELLANEOUS
Qty: 100 EACH | Refills: 5 | Status: SHIPPED | OUTPATIENT
Start: 2020-09-09 | End: 2021-01-05 | Stop reason: SDUPTHER

## 2020-09-22 RX ORDER — AMITRIPTYLINE HYDROCHLORIDE 50 MG/1
TABLET, FILM COATED ORAL
Qty: 100 TABLET | Refills: 3 | Status: SHIPPED | OUTPATIENT
Start: 2020-09-22 | End: 2021-01-05

## 2020-09-24 ENCOUNTER — OFFICE VISIT (OUTPATIENT)
Dept: PRIMARY CARE CLINIC | Age: 65
End: 2020-09-24
Payer: MEDICARE

## 2020-09-24 VITALS
DIASTOLIC BLOOD PRESSURE: 80 MMHG | SYSTOLIC BLOOD PRESSURE: 130 MMHG | TEMPERATURE: 98.4 F | HEART RATE: 78 BPM | OXYGEN SATURATION: 97 %

## 2020-09-24 LAB
BILIRUBIN, POC: ABNORMAL
BLOOD URINE, POC: ABNORMAL
CLARITY, POC: ABNORMAL
COLOR, POC: ABNORMAL
GLUCOSE URINE, POC: ABNORMAL
KETONES, POC: ABNORMAL
LEUKOCYTE EST, POC: ABNORMAL
NITRITE, POC: ABNORMAL
PH, POC: 5
PROTEIN, POC: ABNORMAL
SPECIFIC GRAVITY, POC: 1.01
UROBILINOGEN, POC: 0.2

## 2020-09-24 PROCEDURE — G0008 ADMIN INFLUENZA VIRUS VAC: HCPCS | Performed by: NURSE PRACTITIONER

## 2020-09-24 PROCEDURE — 90688 IIV4 VACCINE SPLT 0.5 ML IM: CPT | Performed by: NURSE PRACTITIONER

## 2020-09-24 PROCEDURE — 81002 URINALYSIS NONAUTO W/O SCOPE: CPT | Performed by: NURSE PRACTITIONER

## 2020-09-24 PROCEDURE — 1123F ACP DISCUSS/DSCN MKR DOCD: CPT | Performed by: NURSE PRACTITIONER

## 2020-09-24 PROCEDURE — 3017F COLORECTAL CA SCREEN DOC REV: CPT | Performed by: NURSE PRACTITIONER

## 2020-09-24 PROCEDURE — G0438 PPPS, INITIAL VISIT: HCPCS | Performed by: NURSE PRACTITIONER

## 2020-09-24 PROCEDURE — 4040F PNEUMOC VAC/ADMIN/RCVD: CPT | Performed by: NURSE PRACTITIONER

## 2020-09-24 ASSESSMENT — LIFESTYLE VARIABLES: HOW OFTEN DO YOU HAVE A DRINK CONTAINING ALCOHOL: 0

## 2020-09-24 NOTE — PATIENT INSTRUCTIONS
· Keep a list of your medicines with you. List all of the prescription medicines, nonprescription medicines, supplements, natural remedies, and vitamins that you take. Tell your healthcare providers who treat you about all of the products you are taking. Your provider can provide you with a form to keep track of them. Just ask. · Follow the directions that come with your medicine, including information about food or alcohol. Make sure you know how and when to take your medicine. Do not take more or less than you are supposed to take. · Keep all medicines out of the reach of children. · Store medicines according to the directions on the label. · Monitor yourself. Learn to know how your body reacts to your new medicine and keep track of how it makes you feel before attempting (If your provider has allowed you to do so) to drive or go to work. · Seek emergency medical attention if you think you have used too much of this medicine. An overdose of any prescription medicine can be fatal. Overdose symptoms may include extreme drowsiness, muscle weakness, confusion, cold and clammy skin, pinpoint pupils, shallow breathing, slow heart rate, fainting, or coma. · Don't share prescription medicines with others, even when they seem to have the same symptoms. What may be good for you may be harmful to others. · If you are no longer taking a prescribed medication and you have pills left please take your pills out of their original containers. Mix crushed pills with an undesirable substance, such as cat litter or used coffee grounds. Put the mixture into a disposable container with a lid, such as an empty margarine tub, or into a sealable bag. Cover up or remove any of your personal information on the empty containers by covering it with black permanent marker or duct tape. Place the sealed container with the mixture, and the empty drug containers, in the trash.    · If you use a medication that is in the form of a patch, dispose of used patches by folding them in half so that the sticky sides meet, and then flushing them down a toilet. They should not be placed in the household trash where children or pets can find them. · If you have any questions, ask your provider or pharmacist for more information. · Be sure to keep all appointments for provider visits or tests. We are committed to providing you with the best care possible. In order to help us achieve these goals please remember to bring all medications, herbal products, and over the counter supplements with you to each visit. If your provider has ordered testing for you, please be sure to follow up with our office if you have not received results within 7 days after the testing took place. *If you receive a survey after visiting one of our offices, please take time to share your experience concerning your physician office visit. These surveys are confidential and no health information about you is shared. We are eager to improve for you and we are counting on your feedback to help make that happen. Personalized Preventive Plan for Loly Liu - 9/24/2020  Medicare offers a range of preventive health benefits. Some of the tests and screenings are paid in full while other may be subject to a deductible, co-insurance, and/or copay. Some of these benefits include a comprehensive review of your medical history including lifestyle, illnesses that may run in your family, and various assessments and screenings as appropriate. After reviewing your medical record and screening and assessments performed today your provider may have ordered immunizations, labs, imaging, and/or referrals for you. A list of these orders (if applicable) as well as your Preventive Care list are included within your After Visit Summary for your review.     Other Preventive Recommendations:    A preventive eye exam performed by an eye specialist is recommended every 1-2 years to screen for glaucoma; cataracts, macular degeneration, and other eye disorders. A preventive dental visit is recommended every 6 months. Try to get at least 150 minutes of exercise per week or 10,000 steps per day on a pedometer . Order or download the FREE \"Exercise & Physical Activity: Your Everyday Guide\" from [employer]. Call 6-863.965.5301 or search [employer] online. You need 4993-2734 mg of calcium and 3736-8765 IU of vitamin D per day. It is possible to meet your calcium requirement with diet alone, but a vitamin D supplement is usually necessary to meet this goal.  When exposed to the sun, use a sunscreen that protects against both UVA and UVB radiation with an SPF of 30 or greater. Reapply every 2 to 3 hours or after sweating, drying off with a towel, or swimming. Always wear a seat belt when traveling in a car. Always wear a helmet when riding a bicycle or motorcycle.

## 2020-09-24 NOTE — PROGRESS NOTES
Medicare Annual Wellness Visit  Name: Shannan Yanez Date: 2020   MRN: [id number] Sex: Male   Age: 77 y.o. Ethnicity: Non-/Non    : 1955 Race: Kathi Green is here for Medicare AWV  He has been feeling well. Sugar has been pretty good. Screenings for behavioral, psychosocial and functional/safety risks, and cognitive dysfunction are all negative except as indicated below. These results, as well as other patient data from the [address] form, are documented in Flowsheets linked to this Encounter. Allergies   Allergen Reactions    Atenolol Other (See Comments)     dizzy         Prior to Visit Medications    Medication Sig Taking? Authorizing Provider   diclofenac sodium (VOLTAREN) 1 % GEL Apply 4 g topically 4 times daily 340B Yes TANESHA Luna   amitriptyline (ELAVIL) 50 MG tablet TAKE 1 TO 2 TABLETS BY MOUTH NIGHTLY FOR SLEEP 340B Yes TANESHA Luna   UNIFINE PENTIPS 31G X 6 MM MISC USE TO INJECT INSULIN EVERY DAY Yes TANESHA Luna   carvedilol (COREG) 25 MG tablet Take 1 tablet by mouth 2 times daily Yes TANESHA Luna   insulin glargine (LANTUS SOLOSTAR) 100 UNIT/ML injection pen Inject 45 Units into the skin 2 times daily Yes TANESHA Luna   levothyroxine (SYNTHROID) 75 MCG tablet TAKE ONE TABLET BY MOUTH EVERY DAY Yes TANESHA Luna   terazosin (HYTRIN) 5 MG capsule Take 1 capsule by mouth nightly 340B Yes TANESHA Luna   omeprazole (PRILOSEC) 40 MG delayed release capsule Take 1 capsule by mouth daily.  340B Yes TANESHA Luna   TRADJENTA 5 MG tablet TAKE 1 TABLET BY MOUTH DAILY Yes TANESHA Luna   glipiZIDE (GLUCOTROL) 10 MG tablet TAKE TWO TABLETS BY MOUTH TWO TIMES A DAY Yes TANESHA Luna   lisinopril (PRINIVIL;ZESTRIL) 40 MG tablet TAKE 1 TABLET BY MOUTH DAILY Yes TANESHA Luna   pravastatin (PRAVACHOL) 80 MG tablet TAKE 1 TABLET BY MOUTH EVERY EVENING Yes Jsu Peterson TANESHA   traZODone (DESYREL) 100 MG tablet Take 1 tablet by mouth nightly 340B Yes TANESHA Carson   metolazone (ZAROXOLYN) 2.5 MG tablet Take 1 tablet by mouth every other day For swelling 340B Yes TANESHA Carson   Needles & Syringes MISC 1 each by Does not apply route daily u-100 insulin syringe Yes TANESHA Carson   FREESTYLE LANCETS MISC TEST TWO TIMES A DAY Yes TANESHA Carson   Respiratory Therapy Supplies PREMA by Does not apply route. Yes Evette Boykin MD   Blood Glucose Monitoring Suppl (FREESTYLE LITE) PREMA by Does not apply route. Yes Floyd Cummings MD   allopurinol (ZYLOPRIM) 100 MG tablet Take 1 tablet by mouth daily 340B  Patient not taking: Reported on 9/24/2020  TANESHA Carson   FREESTYLE LITE strip TEST BLOOD SUGAR TWO TIMES A DAY  TANESHA Carson   glucose blood VI test strips (FREESTYLE LITE) strip As needed. Dx: DM2  TANESHA Carson         Past Medical History:   Diagnosis Date    Chronic back pain     feet,hands,legs,knees    Chronic kidney disease     Stage III    DDD (degenerative disc disease), lumbar     GERD (gastroesophageal reflux disease)     Hypercholesteremia     Hypertension     Left lumbar radiculopathy     Obesity     Type II or unspecified type diabetes mellitus without mention of complication, not stated as uncontrolled 2011    Unspecified sleep apnea        History reviewed. No pertinent surgical history.       Family History   Problem Relation Age of Onset    Diabetes Mother     Cancer Father         lung       CareTeam (Including outside providers/suppliers regularly involved in providing care):   Patient Care Team:  TANESHA Carson as PCP - General (Nurse Practitioner Family)  TANESHA Carson [facility] - [facility] Empaneled Provider    Wt Readings from Last 3 Encounters:   08/20/20 (!) 376 lb (170.6 kg)   05/18/20 (!) 325 lb (147.4 kg)   02/06/20 (!) 385 lb (174.6 kg)     Vitals:    09/24/20 1528   BP: 130/80   Pulse: 78   Temp: 98.4 °F (36.9 °C)   SpO2: 97%     There is no height or weight on file to calculate BMI. Based upon direct observation of the patient, evaluation of cognition reveals recent and remote memory intact. General Appearance: alert and oriented to person, place and time, well developed and well- nourished, in no acute distress  Skin: warm and dry, no rash or erythema  Eyes: pupils equal, round, and reactive to light, extraocular eye movements intact, conjunctivae normal  ENT: tympanic membrane, external ear and ear canal normal bilaterally, nose without deformity, nasal mucosa and turbinates normal without polyps  Neck: supple and non-tender without mass, no thyromegaly or thyroid nodules, no cervical lymphadenopathy  Pulmonary/Chest: clear to auscultation bilaterally- no wheezes, rales or rhonchi, normal air movement, no respiratory distress  Cardiovascular: normal rate, regular rhythm, normal S1 and S2, no murmurs, rubs, clicks, or gallops, distal pulses intact, no carotid bruits  Abdomen: soft, non-tender, non-distended, normal bowel sounds, no masses or organomegaly  Extremities: no cyanosis, clubbing or edema  Musculoskeletal: normal range of motion, no joint swelling, deformity or tenderness  Neurologic: reflexes normal and symmetric, no cranial nerve deficit, gait, coordination and speech normal    Patient's complete Health Risk Assessment and screening values have been reviewed and are found in Flowsheets. The following problems were reviewed today and where indicated follow up appointments were made and/or referrals ordered. Positive Risk Factor Screenings with Interventions:     General Health:  General  In general, how would you say your health is?: Good  In the past 7 days, have you experienced any of the following?  New or Increased Pain, New or Increased Fatigue, Loneliness, Social Isolation, Stress or Anger?: None of These  Do you get the social and emotional support that you need?: Yes  Do you have a Living Will?: (!) No  General Health Risk Interventions:  · none    Health Habits/Nutrition:  Health Habits/Nutrition  Do you exercise for at least 20 minutes 2-3 times per week?: Yes  Have you lost any weight without trying in the past 3 months?: No  Do you eat fewer than 2 meals per day?: No  Have you seen a dentist within the past year?: (!) No(no teeth)  There is no height or weight on file to calculate BMI. Health Habits/Nutrition Interventions:  · none    Hearing/Vision:  No exam data present  Hearing/Vision  Do you or your family notice any trouble with your hearing?: No  Do you have difficulty driving, watching TV, or doing any of your daily activities because of your eyesight?: No  Have you had an eye exam within the past year?: (!) No  Hearing/Vision Interventions:  · none    Safety:  Safety  Do you have working smoke detectors?: Yes  Have all throw rugs been removed or fastened?: (!) No  Do you have non-slip mats or surfaces in all bathtubs/showers?: Yes  Do all of your stairways have a railing or banister?: Yes  Are your doorways, halls and stairs free of clutter?: Yes  Do you always fasten your seatbelt when you are in a car?: Yes  Safety Interventions:  · Home safety tips provided    ADL:  ADLs  In the past 7 days, did you need help from others to perform any of the following everyday activities? Eating, dressing, grooming, bathing, toileting, or walking/balance?: (!) Dressing  In the past 7 days, did you need help from others to take care of any of the following?  Laundry, housekeeping, banking/finances, shopping, telephone use, food preparation, transportation, or taking medications?: None  ADL Interventions:  · Patient declines any further evaluation/treatment for this issue    Personalized Preventive Plan   Current Health Maintenance Status  Immunization History   Administered Date(s) Administered    Influenza, Quadv, IM, (6 mo and older Fluzone, Flulaval, Fluarix and 3 yrs and older Afluria) 09/24/2020    Pneumococcal Polysaccharide ([id number]) 08/20/2020        Health Maintenance   Topic Date Due    Hepatitis C screen  1955    Diabetic foot exam  04/03/1963    Diabetic retinal exam  04/03/1963    DTaP/Tdap/Td vaccine (1 - Tdap) 04/03/1972    Shingles Vaccine (1 of 2) 04/03/2003    Colon Cancer Screen FIT/FOBT  07/14/2018    Annual Wellness Visit (AWV)  02/11/2020    A1C test (Diabetic or Prediabetic)  11/20/2020    Lipid screen  02/06/2021    TSH testing  02/06/2021    Potassium monitoring  02/06/2021    Creatinine monitoring  02/06/2021    Statin Therapy  02/20/2021    Flu vaccine  Completed    Pneumococcal 65+ years Vaccine  Completed    Hepatitis A vaccine  Aged Out    Hib vaccine  Aged Out    Meningococcal (ACWY) vaccine  Aged Out     Recommendations for [employer] Due: see orders and patient instructions/AVS.  . Recommended screening schedule for the next 5-10 years is provided to the patient in written form: see Patient Instructions/AVS.    Enoch Frank was seen today for medicare awv. Diagnoses and all orders for this visit:    Encounter for abdominal aortic aneurysm (AAA) screening  -     US Abdominal Aorta Limited; Future    Routine general medical examination at a health care facility  -     POCT Urinalysis no Micro    Encounter for hepatitis C screening test for low risk patient  -     HEPATITIS C ANTIBODY; Future    Prostate cancer screening  -     PSA, Prostatic Specific Antigen; Future    Encounter for screening for malignant neoplasm of prostate  -     PSA, Prostatic Specific Antigen; Future    Nocturia   -     PSA, Prostatic Specific Antigen; Future    Need for influenza vaccination  -     INFLUENZA, QUADV, 3 YRS AND OLDER, IM, MDV, 0.5ML (Loyce Art)    Medicare annual wellness visit, initial    Other orders  -     diclofenac sodium (VOLTAREN) 1 % GEL; Apply 4 g topically 4 times daily 340B          F/U 3 months.

## 2020-09-24 NOTE — PROGRESS NOTES
Vaccine Information Sheet, \"Influenza - Inactivated\"  given to Ron Pathak, or parent/legal guardian of  Ron Pathak and verbalized understanding. Patient responses:    Have you ever had a reaction to a flu vaccine? No  Do you have any current illness? No  Have you ever had [place]illian [place] Syndrome? No  Do you have a serious allergy to any of the follow: Neomycin, Polymyxin, Thimerosal, eggs or egg products? No    Flu vaccine given per order. Please see immunization tab. Risks and benefits explained. Current VIS given. Pt stayed in exam room x 20 mins after imm.     Immunizations Administered     Name Date Dose Route    Influenza, Quadv, IM, (6 mo and older Fluzone, Flulaval, Fluarix and 3 yrs and older Afluria) 9/24/2020 0.5 mL Intramuscular    Site: Deltoid- Right    Lot: [id number]    NDC: 40860-215-69

## 2020-09-24 NOTE — PROGRESS NOTES
Chief Complaint   Patient presents with    Medicare AWV       Have you seen any other physician or provider since your last visit ER for  wreck    Have you had any other diagnostic tests since your last visit? no    Have you changed or stopped any medications since your last visit? no     Patient has cologard at home and plans to do it soon.

## 2020-09-29 ENCOUNTER — TELEPHONE (OUTPATIENT)
Dept: PRIMARY CARE CLINIC | Age: 65
End: 2020-09-29

## 2020-09-29 NOTE — TELEPHONE ENCOUNTER
----- Message from TANESHA Davidson sent at 9/28/2020  3:59 AM EDT -----  I referred him for AAA screening. I am afraid his abdomen may be too large. Can you check on this?

## 2020-10-19 ENCOUNTER — HOSPITAL ENCOUNTER (OUTPATIENT)
Facility: HOSPITAL | Age: 65
Discharge: HOME | End: 2020-10-19
Payer: MEDICARE

## 2020-10-19 ENCOUNTER — HOSPITAL ENCOUNTER (OUTPATIENT)
Dept: ULTRASOUND IMAGING | Facility: HOSPITAL | Age: 65
Discharge: HOME | End: 2020-10-19
Payer: MEDICARE

## 2020-10-19 PROCEDURE — 76775 US EXAM ABDO BACK WALL LIM: CPT

## 2020-10-19 PROCEDURE — 36415 COLL VENOUS BLD VENIPUNCTURE: CPT

## 2020-10-22 DIAGNOSIS — Z12.11 COLON CANCER SCREENING: ICD-10-CM

## 2020-10-23 ENCOUNTER — TELEPHONE (OUTPATIENT)
Dept: PRIMARY CARE CLINIC | Age: 65
End: 2020-10-23

## 2020-10-23 RX ORDER — LEVOTHYROXINE SODIUM 88 UG/1
88 TABLET ORAL DAILY
Qty: 90 TABLET | Refills: 1 | Status: SHIPPED | OUTPATIENT
Start: 2020-10-23 | End: 2021-01-05 | Stop reason: SDUPTHER

## 2020-10-23 NOTE — TELEPHONE ENCOUNTER
----- Message from TANESHA Patel sent at 10/21/2020 10:02 PM EDT -----  Sugar is better but still too high. Increase insulin to 50u BID. Call if it continues to run above 150 after 2 weeks. Thyroid is a little underactive. Increase Synthroid to 88mcg QD. Recheck TSH at f/u.

## 2020-10-27 RX ORDER — INSULIN GLARGINE 100 [IU]/ML
50 INJECTION, SOLUTION SUBCUTANEOUS 2 TIMES DAILY
Qty: 30 ML | Refills: 3 | Status: SHIPPED
Start: 2020-10-27 | End: 2020-12-09 | Stop reason: SDUPTHER

## 2020-11-03 PROBLEM — E11.9 TYPE 2 DIABETES MELLITUS (HCC): Status: RESOLVED | Noted: 2017-06-29 | Resolved: 2020-11-03

## 2020-11-22 ENCOUNTER — HOSPITAL ENCOUNTER (EMERGENCY)
Facility: HOSPITAL | Age: 65
Discharge: HOME | End: 2020-11-23
Attending: EMERGENCY MEDICINE
Payer: MEDICARE

## 2020-11-22 VITALS
HEART RATE: 97 BPM | HEIGHT: 72 IN | TEMPERATURE: 99.2 F | WEIGHT: 315 LBS | OXYGEN SATURATION: 94 % | SYSTOLIC BLOOD PRESSURE: 174 MMHG | RESPIRATION RATE: 18 BRPM | DIASTOLIC BLOOD PRESSURE: 106 MMHG | BODY MASS INDEX: 42.66 KG/M2

## 2020-11-22 PROCEDURE — 99282 EMERGENCY DEPT VISIT SF MDM: CPT

## 2020-11-22 RX ORDER — AZITHROMYCIN 250 MG/1
500 TABLET, FILM COATED ORAL DAILY
Status: DISCONTINUED | OUTPATIENT
Start: 2020-11-23 | End: 2020-11-23

## 2020-11-22 RX ORDER — AZITHROMYCIN 250 MG/1
TABLET, FILM COATED ORAL
Status: COMPLETED
Start: 2020-11-22 | End: 2020-11-23

## 2020-11-23 PROCEDURE — U0003 INFECTIOUS AGENT DETECTION BY NUCLEIC ACID (DNA OR RNA); SEVERE ACUTE RESPIRATORY SYNDROME CORONAVIRUS 2 (SARS-COV-2) (CORONAVIRUS DISEASE [COVID-19]), AMPLIFIED PROBE TECHNIQUE, MAKING USE OF HIGH THROUGHPUT TECHNOLOGIES AS DESCRIBED BY CMS-2020-01-R: HCPCS

## 2020-11-23 PROCEDURE — 6370000000 HC RX 637 (ALT 250 FOR IP)

## 2020-11-23 RX ORDER — AZITHROMYCIN 250 MG/1
250 TABLET, FILM COATED ORAL DAILY
Qty: 6 TABLET | Refills: 0 | Status: SHIPPED | OUTPATIENT
Start: 2020-11-23 | End: 2020-11-29

## 2020-11-23 RX ORDER — AZITHROMYCIN 250 MG/1
500 TABLET, FILM COATED ORAL DAILY
Status: DISCONTINUED | OUTPATIENT
Start: 2020-11-23 | End: 2020-11-23 | Stop reason: HOSPADM

## 2020-11-23 RX ADMIN — AZITHROMYCIN 500 MG: 250 TABLET, FILM COATED ORAL at 00:11

## 2020-11-23 RX ADMIN — AZITHROMYCIN MONOHYDRATE 500 MG: 250 TABLET ORAL at 00:11

## 2020-11-23 NOTE — ED PROVIDER NOTES
[address]  eMERGENCY dEPARTMENT eNCOUnter      Pt Name: Janel Yap  MRN: [id number]  YOB: 1955  Date of evaluation: 20/17/8916  Provider: Bryon Morales MD    CHIEF COMPLAINT       Chief Complaint   Patient presents with    Cough         HISTORY OF PRESENT ILLNESS  (Location/Symptom, Timing/Onset, Context/Setting, Quality, Duration,Modifying Factors, Severity.)   Janel Yap is a 77 y.o. male who presents to the emergency department with a cough x 2 days with yellow sputum. No shortness of breath    He is not a smoker. His grand-daughter was in the ER last week and diagnosed with bronchitis. He denies any known COVID exposure but has been to the [facility]. Nursing notes were reviewed. REVIEW OF SYSTEMS    (2-9 systems for level 4, 10 or more for level 5)   ROS:  General:  No fevers, no chills, no weakness  Cardiovascular:  No chest pain, no palpitations  Respiratory:  No shortness of breath, + cough, nowheezing  Gastrointestinal:  No pain, no nausea, no vomiting, no diarrhea  Musculoskeletal:  No muscle pain, no joint pain  Skin:  No rash, no easy bruising  Neurologic:  No speech problems, no headache, no extremity numbness, no extremity  tingling, no extremity weakness  Psychiatric:  No anxiety  Genitourinary:  No dysuria, no hematuria    Except as noted above the remainder of the review of systems was reviewed and negative.        PAST MEDICAL HISTORY     Past Medical History:   Diagnosis Date    Chronic back pain     feet,hands,legs,knees    Chronic kidney disease     Stage III    DDD (degenerative disc disease), lumbar     GERD (gastroesophageal reflux disease)     Hypercholesteremia     Hypertension     Left lumbar radiculopathy     Obesity     Type II or unspecified type diabetes mellitus without mention of complication, not stated as uncontrolled 2011    Unspecified sleep apnea          SURGICAL HISTORY     No Socioeconomic History    Marital status:      Spouse name: Not on file    Number of children: Not on file    Years of education: Not on file    Highest education level: Not on file   Occupational History    Not on file   Social Needs    Financial resource strain: Not on file    Food insecurity     Worry: Not on file     Inability: Not on file    Transportation needs     Medical: Not on file     Non-medical: Not on file   Tobacco Use    Smoking status: Never Smoker    Smokeless tobacco: Never Used   Substance and Sexual Activity    Alcohol use: No    Drug use: Not on file    Sexual activity: Not on file   Lifestyle    Physical activity     Days per week: Not on file     Minutes per session: Not on file    Stress: Not on file   Relationships    Social connections     Talks on phone: Not on file     Gets together: Not on file     Attends Sabianism service: Not on file     Active member of club or organization: Not on file     Attends meetings of clubs or organizations: Not on file     Relationship status: Not on file    Intimate partner violence     Fear of current or ex partner: Not on file     Emotionally abused: Not on file     Physically abused: Not on file     Forced sexual activity: Not on file   Other Topics Concern    Not on file   Social History Narrative    Not on file         PHYSICAL EXAM    (up to 7 for level 4, 8 or more for level 5)     ED Triage Vitals   BP Temp Temp src Pulse Resp SpO2 Height Weight   -- -- -- -- -- -- -- --       Physical Exam  General :Patient is awake, alert, oriented, in no acute distress, nontoxic appearing  HEENT: Pupils are equally round and reactive to light, EOMI. Oral mucosa is moist, no exudate. No pharyngeal erythema. Neck: Neck is supple, full range of motion  Cardiac: Heart regular rate, rhythm, no murmurs, rubs, or gallops  Lungs: Lungs are clear to auscultation, there is no wheezing, rhonchi, or rales. Chest wall:  There is no tenderness to palpation over the chest wall or over ribs  Abdomen: Abdomen is soft, nontender, nondistended. There is no firm or pulsatile masses, no rebound, rigidity or guarding. Musculoskeletal: 5 out of 5 strength in all 4 extremities. No focal muscle deficits are appreciated  Back: No midline or bony tenderness. No CVAT. Neuro: Motor intact, sensory intact, level of consciousness is normal.  Dermatology: Skin is warm and dry  Psych: Mentation is grossly normal,cognition is grossly normal. Affect is appropriate. DIAGNOSTIC RESULTS     EKG: All [facility]'s [facility] who either signs or Co-signs this chart in the absence of a cardiologist.      RADIOLOGY:   Non-plain film images such as CT, Ultrasound and MRI are read by the radiologist. Plain radiographic images are visualized and preliminarily interpreted by the emergency physician with the below findings:      []Radiologist's Report Reviewed:  No orders to display         ED BEDSIDE ULTRASOUND:   Performed by ED Physician - none    LABS:  Labs Reviewed   COVID-19   COVID-19   COVID-19   COVID-19   COVID-19       I have reviewed and interpreted all ofthe currently available lab results from this visit (if applicable):  No results found for this visit on 11/22/20. All other labs were within normal range or not returned as of this dictation. EMERGENCY DEPARTMENT COURSE and DIFFERENTIAL DIAGNOSIS/MDM:   Vitals:  Vitals:    11/22/20 2338   BP: (!) 174/106   Pulse: 97   Resp: 18   Temp: 99.2 °F (37.3 °C)   TempSrc: Oral   SpO2: 94%   Weight: (!) 330 lb (149.7 kg)   Height: 6' (1.829 m)       Patient has elevated BP. He says that he has not taken his BP meds for tonight. He assures us that he will. The patient will follow-up with their PCP in 1-2 days for reevaluation. If the patient or family members have any further concerns or any worsening symptoms they will return to the ED for reevaluation. CONSULTS:  None    PROCEDURES:  Procedures    CRITICAL CARE TIME    Total Critical Care time was 0 minutes, excluding separately reportable procedures. There was a high probability of clinically significant/life threatening deterioration in the patient's condition which required my urgent intervention. FINAL IMPRESSION      1. Cough    2. Encounter for laboratory testing for COVID-19 virus          DISPOSITION/PLAN   DISPOSITION        PATIENT REFERRED TO:  Neris Marrufo APRUBALDO  [facility]  [phone]    Schedule an appointment as soon as possible for a visit in 2 days  As needed      DISCHARGE MEDICATIONS:  New Prescriptions    AZITHROMYCIN (ZITHROMAX) 250 MG TABLET    Take 1 tablet by mouth daily for 6 doses Take 2 tabs on Day #1  Then 1 tab on Days #2-5       Comment: Please note this report has been produced using speech recognition software and may contain errorsrelated to that system including errors in grammar, punctuation, and spelling, as well as words and phrases that may be inappropriate. If there are any questions or concerns please feel free to contact the dictating providerfor clarification.     Edinson Gonzalez MD  Attending Emergency Physician                Edinson Gonzalez MD  11/23/20 2713

## 2020-11-24 ENCOUNTER — CARE COORDINATION (OUTPATIENT)
Dept: CARE COORDINATION | Age: 65
End: 2020-11-24

## 2020-11-24 LAB — SARS-COV-2: NOT DETECTED

## 2020-11-25 ENCOUNTER — CARE COORDINATION (OUTPATIENT)
Dept: CARE COORDINATION | Age: 65
End: 2020-11-25

## 2020-11-25 NOTE — CARE COORDINATION
ACM called multiple phone numbers to reach patient for CT call re: ED visit on 11/23/20. Unable to speak to patient or leave a message. Will close CT Episode at this time. If patient should call, I will complete the CT protocol for ER follow up.   Electronically signed by Frances Diaz RN on 11/25/2020 at 3:23 PM

## 2020-12-07 RX ORDER — INSULIN GLARGINE 100 [IU]/ML
INJECTION, SOLUTION SUBCUTANEOUS
Qty: 45 ML | Refills: 3 | OUTPATIENT
Start: 2020-12-07

## 2020-12-09 ENCOUNTER — CARE COORDINATION (OUTPATIENT)
Dept: CARE COORDINATION | Age: 65
End: 2020-12-09

## 2020-12-09 RX ORDER — TRAZODONE HYDROCHLORIDE 100 MG/1
TABLET ORAL
Qty: 30 TABLET | Refills: 2 | Status: SHIPPED | OUTPATIENT
Start: 2020-12-09 | End: 2021-01-05 | Stop reason: SDUPTHER

## 2020-12-09 RX ORDER — INSULIN GLARGINE 100 [IU]/ML
INJECTION, SOLUTION SUBCUTANEOUS
Qty: 45 ML | Refills: 3 | Status: SHIPPED | OUTPATIENT
Start: 2020-12-09 | End: 2021-01-05

## 2020-12-09 RX ORDER — INSULIN GLARGINE 100 [IU]/ML
50 INJECTION, SOLUTION SUBCUTANEOUS 2 TIMES DAILY
Qty: 30 ML | Refills: 3 | Status: SHIPPED | OUTPATIENT
Start: 2020-12-09 | End: 2021-01-05 | Stop reason: SDUPTHER

## 2020-12-09 NOTE — CARE COORDINATION
Patient contacted regarding recent discharge and COVID-19 risk. Discussed COVID-19 related testing which was available at this time. Test results were negative. Patient informed of results, if available? Yes     Care Transition Nurse/ Ambulatory Care Manager contacted the patient by telephone to perform post discharge assessment. Verified name and  with patient as identifiers. Patient has following risk factors of: diabetes and chronic kidney disease. CTN/ACM reviewed discharge instructions, medical action plan and red flags related to discharge diagnosis. Reviewed and educated them on any new and changed medications related to discharge diagnosis. Advised obtaining a 90-day supply of all daily and as-needed medications. Education provided regarding infection prevention, and signs and symptoms of COVID-19 and when to seek medical attention with patient who verbalized understanding. Discussed exposure protocols and quarantine from [address] you at higher risk for severe illness  and given an opportunity for questions and concerns. The patient agrees to contact the COVID-19 hotline [phone] or PCP office for questions related to their healthcare. CTN/ACM provided contact information for future reference. Pt called back to [facility] today, saw my cell # on his wife's phone. Pt stated he took his medication for URI and is feeling fine. He did not have COVID 19. Pt has been able to stay healthy during the pandemic, has practiced consistent COVID precautions as below. He has his routine medications and has no further complaints or concerns. Pt has my cell # for call back if needed. From [facility] [place] [facility] [place]: Are you at higher risk for severe illness?  Wash your hands often.  Avoid close contact (6 feet, which is about two arm lengths) with people who are sick.  Put distance between yourself and other people if COVID-19 is spreading in your community.    Clean and disinfect frequently touched surfaces.  Avoid all cruise travel and non-essential air travel.  Call your healthcare professional if you have concerns about COVID-19 and your underlying condition or if you are sick. For more information on steps you can take to protect yourself, see CDC's How to Protect Yourself    No further follow-up needed.

## 2021-01-05 ENCOUNTER — HOSPITAL ENCOUNTER (OUTPATIENT)
Facility: HOSPITAL | Age: 66
Discharge: HOME | End: 2021-01-05
Payer: MEDICARE

## 2021-01-05 ENCOUNTER — OFFICE VISIT (OUTPATIENT)
Dept: PRIMARY CARE CLINIC | Age: 66
End: 2021-01-05
Payer: MEDICARE

## 2021-01-05 VITALS
BODY MASS INDEX: 42.66 KG/M2 | HEIGHT: 72 IN | TEMPERATURE: 98.3 F | SYSTOLIC BLOOD PRESSURE: 168 MMHG | RESPIRATION RATE: 16 BRPM | WEIGHT: 315 LBS | DIASTOLIC BLOOD PRESSURE: 98 MMHG

## 2021-01-05 DIAGNOSIS — I10 ESSENTIAL HYPERTENSION: ICD-10-CM

## 2021-01-05 DIAGNOSIS — M10.9 GOUT, UNSPECIFIED CAUSE, UNSPECIFIED CHRONICITY, UNSPECIFIED SITE: ICD-10-CM

## 2021-01-05 DIAGNOSIS — N18.30 STAGE 3 CHRONIC KIDNEY DISEASE, UNSPECIFIED WHETHER STAGE 3A OR 3B CKD (HCC): ICD-10-CM

## 2021-01-05 DIAGNOSIS — G47.00 INSOMNIA, UNSPECIFIED TYPE: ICD-10-CM

## 2021-01-05 DIAGNOSIS — E03.9 HYPOTHYROIDISM, UNSPECIFIED TYPE: ICD-10-CM

## 2021-01-05 DIAGNOSIS — E11.65 UNCONTROLLED TYPE 2 DIABETES MELLITUS WITH HYPERGLYCEMIA (HCC): Primary | ICD-10-CM

## 2021-01-05 PROCEDURE — G8482 FLU IMMUNIZE ORDER/ADMIN: HCPCS | Performed by: NURSE PRACTITIONER

## 2021-01-05 PROCEDURE — 2022F DILAT RTA XM EVC RTNOPTHY: CPT | Performed by: NURSE PRACTITIONER

## 2021-01-05 PROCEDURE — 99214 OFFICE O/P EST MOD 30 MIN: CPT | Performed by: NURSE PRACTITIONER

## 2021-01-05 PROCEDURE — 3046F HEMOGLOBIN A1C LEVEL >9.0%: CPT | Performed by: NURSE PRACTITIONER

## 2021-01-05 PROCEDURE — 4040F PNEUMOC VAC/ADMIN/RCVD: CPT | Performed by: NURSE PRACTITIONER

## 2021-01-05 PROCEDURE — 1123F ACP DISCUSS/DSCN MKR DOCD: CPT | Performed by: NURSE PRACTITIONER

## 2021-01-05 PROCEDURE — G8417 CALC BMI ABV UP PARAM F/U: HCPCS | Performed by: NURSE PRACTITIONER

## 2021-01-05 PROCEDURE — 3017F COLORECTAL CA SCREEN DOC REV: CPT | Performed by: NURSE PRACTITIONER

## 2021-01-05 PROCEDURE — 36415 COLL VENOUS BLD VENIPUNCTURE: CPT

## 2021-01-05 PROCEDURE — G8427 DOCREV CUR MEDS BY ELIG CLIN: HCPCS | Performed by: NURSE PRACTITIONER

## 2021-01-05 PROCEDURE — 1036F TOBACCO NON-USER: CPT | Performed by: NURSE PRACTITIONER

## 2021-01-05 RX ORDER — PEN NEEDLE, DIABETIC
NEEDLE, DISPOSABLE MISCELLANEOUS
Qty: 100 EACH | Refills: 5 | Status: SHIPPED | OUTPATIENT
Start: 2021-01-05 | End: 2021-01-05 | Stop reason: SDUPTHER

## 2021-01-05 RX ORDER — CARVEDILOL 25 MG/1
25 TABLET ORAL 2 TIMES DAILY
Qty: 180 TABLET | Refills: 3 | Status: SHIPPED | OUTPATIENT
Start: 2021-01-05 | End: 2021-01-05 | Stop reason: SDUPTHER

## 2021-01-05 RX ORDER — OMEPRAZOLE 40 MG/1
CAPSULE, DELAYED RELEASE ORAL
Qty: 90 CAPSULE | Refills: 3 | Status: SHIPPED | OUTPATIENT
Start: 2021-01-05

## 2021-01-05 RX ORDER — PRAVASTATIN SODIUM 80 MG/1
TABLET ORAL
Qty: 90 TABLET | Refills: 3 | Status: SHIPPED | OUTPATIENT
Start: 2021-01-05

## 2021-01-05 RX ORDER — OMEPRAZOLE 40 MG/1
CAPSULE, DELAYED RELEASE ORAL
Qty: 90 CAPSULE | Refills: 3 | Status: SHIPPED | OUTPATIENT
Start: 2021-01-05 | End: 2021-01-05 | Stop reason: SDUPTHER

## 2021-01-05 RX ORDER — TRAZODONE HYDROCHLORIDE 100 MG/1
TABLET ORAL
Qty: 90 TABLET | Refills: 3 | Status: SHIPPED | OUTPATIENT
Start: 2021-01-05

## 2021-01-05 RX ORDER — INSULIN GLARGINE 100 [IU]/ML
50 INJECTION, SOLUTION SUBCUTANEOUS 2 TIMES DAILY
Qty: 30 ML | Refills: 3 | Status: SHIPPED | OUTPATIENT
Start: 2021-01-05 | End: 2021-05-27

## 2021-01-05 RX ORDER — ALLOPURINOL 100 MG/1
100 TABLET ORAL DAILY
Qty: 90 TABLET | Refills: 3 | Status: SHIPPED | OUTPATIENT
Start: 2021-01-05 | End: 2021-01-05 | Stop reason: SDUPTHER

## 2021-01-05 RX ORDER — TRAZODONE HYDROCHLORIDE 100 MG/1
TABLET ORAL
Qty: 90 TABLET | Refills: 3 | Status: SHIPPED | OUTPATIENT
Start: 2021-01-05 | End: 2021-01-05 | Stop reason: SDUPTHER

## 2021-01-05 RX ORDER — METOLAZONE 2.5 MG/1
2.5 TABLET ORAL EVERY OTHER DAY
Qty: 45 TABLET | Refills: 3 | Status: SHIPPED | OUTPATIENT
Start: 2021-01-05

## 2021-01-05 RX ORDER — GLIPIZIDE 10 MG/1
TABLET ORAL
Qty: 360 TABLET | Refills: 3 | Status: SHIPPED | OUTPATIENT
Start: 2021-01-05

## 2021-01-05 RX ORDER — PEN NEEDLE, DIABETIC
NEEDLE, DISPOSABLE MISCELLANEOUS
Qty: 100 EACH | Refills: 5 | Status: SHIPPED | OUTPATIENT
Start: 2021-01-05

## 2021-01-05 RX ORDER — GLIPIZIDE 10 MG/1
TABLET ORAL
Qty: 360 TABLET | Refills: 3 | Status: SHIPPED | OUTPATIENT
Start: 2021-01-05 | End: 2021-01-05 | Stop reason: SDUPTHER

## 2021-01-05 RX ORDER — LEVOTHYROXINE SODIUM 88 UG/1
88 TABLET ORAL DAILY
Qty: 90 TABLET | Refills: 3 | Status: SHIPPED | OUTPATIENT
Start: 2021-01-05 | End: 2021-01-05 | Stop reason: SDUPTHER

## 2021-01-05 RX ORDER — LISINOPRIL 40 MG/1
TABLET ORAL
Qty: 90 TABLET | Refills: 3 | Status: SHIPPED | OUTPATIENT
Start: 2021-01-05 | End: 2021-01-05 | Stop reason: SDUPTHER

## 2021-01-05 RX ORDER — ALLOPURINOL 100 MG/1
100 TABLET ORAL DAILY
Qty: 90 TABLET | Refills: 3 | Status: SHIPPED | OUTPATIENT
Start: 2021-01-05 | End: 2022-09-11

## 2021-01-05 RX ORDER — LISINOPRIL 40 MG/1
TABLET ORAL
Qty: 90 TABLET | Refills: 3 | Status: SHIPPED | OUTPATIENT
Start: 2021-01-05

## 2021-01-05 RX ORDER — LEVOTHYROXINE SODIUM 88 UG/1
88 TABLET ORAL DAILY
Qty: 90 TABLET | Refills: 3 | Status: SHIPPED | OUTPATIENT
Start: 2021-01-05

## 2021-01-05 RX ORDER — CARVEDILOL 25 MG/1
25 TABLET ORAL 2 TIMES DAILY
Qty: 180 TABLET | Refills: 3 | Status: SHIPPED | OUTPATIENT
Start: 2021-01-05

## 2021-01-05 RX ORDER — TERAZOSIN 5 MG/1
5 CAPSULE ORAL NIGHTLY
Qty: 90 CAPSULE | Refills: 3 | Status: SHIPPED | OUTPATIENT
Start: 2021-01-05 | End: 2021-01-05 | Stop reason: SDUPTHER

## 2021-01-05 RX ORDER — TERAZOSIN 5 MG/1
5 CAPSULE ORAL NIGHTLY
Qty: 90 CAPSULE | Refills: 3 | Status: SHIPPED | OUTPATIENT
Start: 2021-01-05

## 2021-01-05 RX ORDER — METOLAZONE 2.5 MG/1
2.5 TABLET ORAL EVERY OTHER DAY
Qty: 45 TABLET | Refills: 3 | Status: SHIPPED | OUTPATIENT
Start: 2021-01-05 | End: 2021-01-05 | Stop reason: SDUPTHER

## 2021-01-05 RX ORDER — PRAVASTATIN SODIUM 80 MG/1
TABLET ORAL
Qty: 90 TABLET | Refills: 3 | Status: SHIPPED | OUTPATIENT
Start: 2021-01-05 | End: 2021-01-05 | Stop reason: SDUPTHER

## 2021-01-05 ASSESSMENT — ENCOUNTER SYMPTOMS
NAUSEA: 0
RHINORRHEA: 0
CONSTIPATION: 0
VOMITING: 0
EYE REDNESS: 0
SORE THROAT: 0
COUGH: 0
EYE DISCHARGE: 0
ABDOMINAL PAIN: 0
SHORTNESS OF BREATH: 0
DIARRHEA: 0
EYE ITCHING: 0

## 2021-01-05 ASSESSMENT — PATIENT HEALTH QUESTIONNAIRE - PHQ9
1. LITTLE INTEREST OR PLEASURE IN DOING THINGS: 0
SUM OF ALL RESPONSES TO PHQ9 QUESTIONS 1 & 2: 0
SUM OF ALL RESPONSES TO PHQ QUESTIONS 1-9: 0
SUM OF ALL RESPONSES TO PHQ QUESTIONS 1-9: 0

## 2021-01-05 NOTE — PROGRESS NOTES
Have you seen any other physician or provider since your last visit? No    Have you had any other diagnostic tests since your last visit? No    Have you changed or stopped any medications since your last visit? No     Eye: will make own appt. Colon: mailed one in a few days ago. SUBJECTIVE:    Patient ID: Amadou Glaser is a 77 y.o. male. Medical History Review  Past Medical, Family, and Social History reviewed and does contribute to the patient presenting condition    Health Maintenance Due   Topic Date Due    Diabetic foot exam  1963    Diabetic retinal exam  1963    DTaP/Tdap/Td vaccine (1 - Tdap) 1972    Shingles Vaccine (1 of 2) 2003    Colon Cancer Screen FIT/FOBT  2018    A1C test (Diabetic or Prediabetic)  2021       HPI:   Chief Complaint   Patient presents with    Diabetes    Hypertension    Hypothyroidism     Pt f/u on DM, htn and hyperlipidemia. Sugar fastin-130       Patient's medications, allergies, past medical, surgical, social and family histories were reviewed and updated as appropriate. Review of Systems   Constitutional: Negative for chills, fatigue and fever. HENT: Negative for congestion, ear pain, rhinorrhea and sore throat. Eyes: Negative for discharge, redness and itching. Respiratory: Negative for cough and shortness of breath. Cardiovascular: Negative for chest pain, palpitations and leg swelling. Gastrointestinal: Negative for abdominal pain, constipation, diarrhea, nausea and vomiting. Endocrine: Negative for cold intolerance and heat intolerance. Genitourinary: Negative for dysuria. Musculoskeletal: Negative for arthralgias and joint swelling. Skin: Negative for rash and wound. Neurological: Negative for weakness and headaches. Hematological: Negative for adenopathy. Psychiatric/Behavioral: Negative for dysphoric mood and sleep disturbance. The patient is not nervous/anxious.      Reviewed and acurate. See MA note. OBJECTIVE:  BP (!) 168/98 (Site: Right Lower Arm, Position: Sitting)   Temp 98.3 °F (36.8 °C) (Temporal)   Resp 16   Ht 6' (1.829 m)   Wt (!) 384 lb (174.2 kg)   BMI 52.08 kg/m²    Physical Exam  Constitutional:       Appearance: He is well-developed. HENT:      Head: Normocephalic and atraumatic. Right Ear: External ear normal.      Left Ear: External ear normal.   Eyes:      Conjunctiva/sclera: Conjunctivae normal.      Pupils: Pupils are equal, round, and reactive to light. Neck:      Musculoskeletal: Neck supple. Thyroid: No thyromegaly. Vascular: No JVD. Cardiovascular:      Rate and Rhythm: Normal rate and regular rhythm. Heart sounds: Normal heart sounds. No murmur. No friction rub. No gallop. Pulmonary:      Effort: Pulmonary effort is normal. No respiratory distress. Breath sounds: Normal breath sounds. Abdominal:      General: Bowel sounds are normal. There is no distension. Palpations: Abdomen is soft. Tenderness: There is no abdominal tenderness. Musculoskeletal: Normal range of motion. Lymphadenopathy:      Cervical: No cervical adenopathy. Skin:     General: Skin is warm and dry. Neurological:      Mental Status: He is alert and oriented to person, place, and time. Cranial Nerves: No cranial nerve deficit. No results found for requested labs within last 30 days. Hemoglobin A1C (%)   Date Value   10/19/2020 9.6 (H)     Microscopic Examination (no units)   Date Value   05/18/2020 YES     LDL Calculated (mg/dL)   Date Value   10/19/2020 68       Lab Results   Component Value Date    WBC 9.9 03/06/2018    NEUTROABS 4.6 03/06/2018    HGB 15.9 03/06/2018    HCT 47.6 03/06/2018    MCV 88.0 03/06/2018     03/06/2018     Lab Results   Component Value Date    TSH 4.620 (H) 10/19/2020       Prior to Visit Medications    Medication Sig Taking?  Authorizing Provider   diclofenac sodium (VOLTAREN) 1 % GEL Apply 4 g topically 4 times daily 340B Yes TANESHA Dockery   Needles & Syringes MISC 1 each by Does not apply route daily u-100 insulin syringe Yes TANESHA Dockery   FREESTYLE LANCETS MISC TEST TWO TIMES A DAY Yes TANESHA Dockery   FREESTYLE LITE strip TEST BLOOD SUGAR TWO TIMES A DAY Yes TANESHA Dockery   Respiratory Therapy Supplies PREMA by Does not apply route. Yes Mirella Geronimo MD   traZODone (DESYREL) 100 MG tablet TAKE ONE TABLET BY MOUTH NIGHTLY for sleep  TANESHA Dockery   levothyroxine (SYNTHROID) 88 MCG tablet Take 1 tablet by mouth daily thyroid  TANESHA Dockery   Insulin Pen Needle (UNIFINE PENTIPS) 31G X 6 MM MISC USE TO INJECT INSULIN EVERY DAY  TANESHA Dockery   carvedilol (COREG) 25 MG tablet Take 1 tablet by mouth 2 times daily For blood pressure  Valdivia GaizzyeTANESHA   terazosin (HYTRIN) 5 MG capsule Take 1 capsule by mouth nightly  TANESHA Dockery   omeprazole (PRILOSEC) 40 MG delayed release capsule Take 1 capsule by mouth daily. For stomach  Valdivia Gauze, TANESHA   linagliptin (TRADJENTA) 5 MG tablet TAKE 1 TABLET BY MOUTH DAILY for sugar  Valdivia Gauze, APRUBALDO   glipiZIDE (GLUCOTROL) 10 MG tablet TAKE TWO TABLETS BY MOUTH TWO TIMES A DAY for sugar  Valdivia Gauze, APRUBALDO   lisinopril (PRINIVIL;ZESTRIL) 40 MG tablet TAKE 1 TABLET BY MOUTH DAILY for blood pressure  Valdivia Gaizzye APRUBALDO   pravastatin (PRAVACHOL) 80 MG tablet TAKE 1 TABLET BY MOUTH EVERY EVENING for cholesterol  Valdivia Gauze APRUBALDO   allopurinol (ZYLOPRIM) 100 MG tablet Take 1 tablet by mouth daily For gout  Valdivia Gachavez APRUBALDO   metOLazone (ZAROXOLYN) 2.5 MG tablet Take 1 tablet by mouth every other day For swelling  TANESHA Dockery   insulin glargine (LANTUS SOLOSTAR) 100 UNIT/ML injection pen Inject 50 Units into the skin 2 times daily  TANESHA Dockery       ASSESSMENT:  1.  Uncontrolled diabetes mellitus (HCC)    2. Stage 3 chronic kidney disease, unspecified whether stage 3a or 3b CKD 3. Essential hypertension    4. Hypothyroidism, unspecified type    5. Insomnia, unspecified type    6. Gout, unspecified cause, unspecified chronicity, unspecified site        PLAN:  Orders Placed This Encounter   Medications    DISCONTD: traZODone (DESYREL) 100 MG tablet     Sig: TAKE ONE TABLET BY MOUTH NIGHTLY for sleep     Dispense:  90 tablet     Refill:  3    DISCONTD: levothyroxine (SYNTHROID) 88 MCG tablet     Sig: Take 1 tablet by mouth daily thyroid     Dispense:  90 tablet     Refill:  3    DISCONTD: Insulin Pen Needle (UNIFINE PENTIPS) 31G X 6 MM MISC     Sig: USE TO INJECT INSULIN EVERY DAY     Dispense:  100 each     Refill:  5    DISCONTD: carvedilol (COREG) 25 MG tablet     Sig: Take 1 tablet by mouth 2 times daily For blood pressure     Dispense:  180 tablet     Refill:  3    DISCONTD: terazosin (HYTRIN) 5 MG capsule     Sig: Take 1 capsule by mouth nightly     Dispense:  90 capsule     Refill:  3    DISCONTD: omeprazole (PRILOSEC) 40 MG delayed release capsule     Sig: Take 1 capsule by mouth daily.  For stomach     Dispense:  90 capsule     Refill:  3    DISCONTD: linagliptin (TRADJENTA) 5 MG tablet     Sig: TAKE 1 TABLET BY MOUTH DAILY for sugar     Dispense:  90 tablet     Refill:  3    DISCONTD: glipiZIDE (GLUCOTROL) 10 MG tablet     Sig: TAKE TWO TABLETS BY MOUTH TWO TIMES A DAY for sugar     Dispense:  360 tablet     Refill:  3    DISCONTD: lisinopril (PRINIVIL;ZESTRIL) 40 MG tablet     Sig: TAKE 1 TABLET BY MOUTH DAILY for blood pressure     Dispense:  90 tablet     Refill:  3    DISCONTD: pravastatin (PRAVACHOL) 80 MG tablet     Sig: TAKE 1 TABLET BY MOUTH EVERY EVENING for cholesterol     Dispense:  90 tablet     Refill:  3    DISCONTD: allopurinol (ZYLOPRIM) 100 MG tablet     Sig: Take 1 tablet by mouth daily For gout     Dispense:  90 tablet     Refill:  3    DISCONTD: metOLazone (ZAROXOLYN) 2.5 MG tablet     Sig: Take 1 tablet by mouth every other day For swelling Dispense:  45 tablet     Refill:  3       Patient Instructions   Stop Amitriptyline. Continue Trazodone. Return in about 3 months (around 4/5/2021). Janine Solano CMA am scribing for and in the presence of TANESHA Hayes on 1/6/2021 at 11:09 PM.      Waleska ALMENDAREZ, personally performed the services described in the documentation as scribed by Naheed Hilario CMA, in my presence and it is both accurate and complete.

## 2021-01-06 RX ORDER — INSULIN GLARGINE 100 [IU]/ML
INJECTION, SOLUTION SUBCUTANEOUS
Qty: 45 ML | Refills: 3 | OUTPATIENT
Start: 2021-01-06

## 2021-04-07 ENCOUNTER — OFFICE VISIT (OUTPATIENT)
Dept: PRIMARY CARE CLINIC | Age: 66
End: 2021-04-07
Payer: MEDICARE

## 2021-04-07 VITALS
DIASTOLIC BLOOD PRESSURE: 80 MMHG | RESPIRATION RATE: 16 BRPM | HEART RATE: 73 BPM | OXYGEN SATURATION: 98 % | BODY MASS INDEX: 42.66 KG/M2 | SYSTOLIC BLOOD PRESSURE: 136 MMHG | WEIGHT: 315 LBS | TEMPERATURE: 97.8 F | HEIGHT: 72 IN

## 2021-04-07 DIAGNOSIS — E11.65 UNCONTROLLED TYPE 2 DIABETES MELLITUS WITH HYPERGLYCEMIA (HCC): Primary | ICD-10-CM

## 2021-04-07 DIAGNOSIS — N18.30 STAGE 3 CHRONIC KIDNEY DISEASE, UNSPECIFIED WHETHER STAGE 3A OR 3B CKD (HCC): ICD-10-CM

## 2021-04-07 DIAGNOSIS — M17.0 LOCALIZED OSTEOARTHRITIS OF BOTH KNEES: ICD-10-CM

## 2021-04-07 DIAGNOSIS — I10 ESSENTIAL HYPERTENSION: ICD-10-CM

## 2021-04-07 PROCEDURE — 3052F HG A1C>EQUAL 8.0%<EQUAL 9.0%: CPT | Performed by: NURSE PRACTITIONER

## 2021-04-07 PROCEDURE — 99214 OFFICE O/P EST MOD 30 MIN: CPT | Performed by: NURSE PRACTITIONER

## 2021-04-07 PROCEDURE — 3017F COLORECTAL CA SCREEN DOC REV: CPT | Performed by: NURSE PRACTITIONER

## 2021-04-07 PROCEDURE — G8427 DOCREV CUR MEDS BY ELIG CLIN: HCPCS | Performed by: NURSE PRACTITIONER

## 2021-04-07 PROCEDURE — 4040F PNEUMOC VAC/ADMIN/RCVD: CPT | Performed by: NURSE PRACTITIONER

## 2021-04-07 PROCEDURE — 2022F DILAT RTA XM EVC RTNOPTHY: CPT | Performed by: NURSE PRACTITIONER

## 2021-04-07 PROCEDURE — 1036F TOBACCO NON-USER: CPT | Performed by: NURSE PRACTITIONER

## 2021-04-07 PROCEDURE — G8417 CALC BMI ABV UP PARAM F/U: HCPCS | Performed by: NURSE PRACTITIONER

## 2021-04-07 PROCEDURE — 1123F ACP DISCUSS/DSCN MKR DOCD: CPT | Performed by: NURSE PRACTITIONER

## 2021-04-07 ASSESSMENT — ENCOUNTER SYMPTOMS
DIARRHEA: 0
SHORTNESS OF BREATH: 0
NAUSEA: 0
SORE THROAT: 0
EYE ITCHING: 0
VOMITING: 0
EYE DISCHARGE: 0
CONSTIPATION: 0
COUGH: 0
RHINORRHEA: 0
ABDOMINAL PAIN: 0
EYE REDNESS: 0

## 2021-04-07 NOTE — PROGRESS NOTES
Have you seen any other physician or provider since your last visit? No    Have you had any other diagnostic tests since your last visit? No    Have you changed or stopped any medications since your last visit? No    SUBJECTIVE:    Patient ID: Sameul Sandhoff is a 74 y.o. male. Medical History Review  Past Medical, Family, and Social History reviewed. Health Maintenance Due   Topic Date Due    Diabetic foot exam  Never done    Diabetic retinal exam  Never done    DTaP/Tdap/Td vaccine (1 - Tdap) Never done    Shingles Vaccine (1 of 2) Never done    Colon Cancer Screen FIT/FOBT  07/14/2018    COVID-19 Vaccine (2 - Moderna 2-dose series) 04/08/2021       HPI:   Chief Complaint   Patient presents with    Diabetes    Hypertension    Gastroesophageal Reflux     Pt here today for a f/u on DM, HTN and GERD. He does not check sugar at home. He stopped drinking Lindsay-8 and his sugar has been good. Home BP has been good. Patient's medications, allergies, past medical, surgical, social and family histories were reviewed and updated as appropriate. Review of Systems   Constitutional: Negative for chills, fatigue and fever. HENT: Negative for congestion, ear pain, rhinorrhea and sore throat. Eyes: Negative for discharge, redness and itching. Respiratory: Negative for cough and shortness of breath. Cardiovascular: Negative for chest pain, palpitations and leg swelling. Gastrointestinal: Negative for abdominal pain, constipation, diarrhea, nausea and vomiting. Endocrine: Negative for cold intolerance and heat intolerance. Genitourinary: Negative for dysuria. Musculoskeletal: Positive for arthralgias. Negative for joint swelling. Skin: Negative for rash and wound. Neurological: Negative for weakness and headaches. Hematological: Negative for adenopathy. Psychiatric/Behavioral: Negative for dysphoric mood and sleep disturbance. The patient is not nervous/anxious.      Reviewed and acurate. See MA note. OBJECTIVE:  /80 (Site: Right Lower Arm, Position: Sitting)   Pulse 73   Temp 97.8 °F (36.6 °C) (Temporal)   Resp 16   Ht 6' (1.829 m)   Wt (!) 382 lb (173.3 kg)   SpO2 98% Comment: room air  BMI 51.81 kg/m²    Physical Exam  Constitutional:       Appearance: He is well-developed. HENT:      Head: Normocephalic and atraumatic. Right Ear: External ear normal.      Left Ear: External ear normal.   Eyes:      Conjunctiva/sclera: Conjunctivae normal.      Pupils: Pupils are equal, round, and reactive to light. Neck:      Musculoskeletal: Neck supple. Thyroid: No thyromegaly. Vascular: No JVD. Cardiovascular:      Rate and Rhythm: Normal rate and regular rhythm. Heart sounds: Normal heart sounds. No murmur. No friction rub. No gallop. Pulmonary:      Effort: Pulmonary effort is normal. No respiratory distress. Breath sounds: Normal breath sounds. Abdominal:      General: Bowel sounds are normal. There is no distension. Palpations: Abdomen is soft. Tenderness: There is no abdominal tenderness. Musculoskeletal: Normal range of motion. Lymphadenopathy:      Cervical: No cervical adenopathy. Skin:     General: Skin is warm and dry. Neurological:      Mental Status: He is alert and oriented to person, place, and time. Cranial Nerves: No cranial nerve deficit. No results found for requested labs within last 30 days. Hemoglobin A1C (%)   Date Value   01/05/2021 8.3 (H)     Microscopic Examination (no units)   Date Value   05/18/2020 YES     LDL Calculated (mg/dL)   Date Value   10/19/2020 68       Lab Results   Component Value Date    WBC 9.9 03/06/2018    NEUTROABS 4.6 03/06/2018    HGB 15.9 03/06/2018    HCT 47.6 03/06/2018    MCV 88.0 03/06/2018     03/06/2018     Lab Results   Component Value Date    TSH 4.620 (H) 10/19/2020       Prior to Visit Medications    Medication Sig Taking?  Authorizing Provider diclofenac sodium (VOLTAREN) 1 % GEL Apply topically 2 times daily Yes TANESHA Ellis   traZODone (DESYREL) 100 MG tablet TAKE ONE TABLET BY MOUTH NIGHTLY for sleep Yes TANESHA Ellis   levothyroxine (SYNTHROID) 88 MCG tablet Take 1 tablet by mouth daily thyroid Yes TANESHA Ellis   Insulin Pen Needle (UNIFINE PENTIPS) 31G X 6 MM MISC USE TO INJECT INSULIN EVERY DAY Yes TANESHA Ellis   carvedilol (COREG) 25 MG tablet Take 1 tablet by mouth 2 times daily For blood pressure Yes TANESHA Ellis   terazosin (HYTRIN) 5 MG capsule Take 1 capsule by mouth nightly Yes TANESHA Ellis   omeprazole (PRILOSEC) 40 MG delayed release capsule Take 1 capsule by mouth daily.  For stomach Yes TANESHA Ellis   linagliptin (TRADJENTA) 5 MG tablet TAKE 1 TABLET BY MOUTH DAILY for sugar Yes TANESHA Ellis   glipiZIDE (GLUCOTROL) 10 MG tablet TAKE TWO TABLETS BY MOUTH TWO TIMES A DAY for sugar Yes TANESHA Ellis   lisinopril (PRINIVIL;ZESTRIL) 40 MG tablet TAKE 1 TABLET BY MOUTH DAILY for blood pressure Yes TANESHA Ellis   pravastatin (PRAVACHOL) 80 MG tablet TAKE 1 TABLET BY MOUTH EVERY EVENING for cholesterol Yes TANESHA Ellis   allopurinol (ZYLOPRIM) 100 MG tablet Take 1 tablet by mouth daily For gout Yes TANESHA Ellis   metOLazone (ZAROXOLYN) 2.5 MG tablet Take 1 tablet by mouth every other day For swelling Yes TANESHA Ellis   insulin glargine (LANTUS SOLOSTAR) 100 UNIT/ML injection pen Inject 50 Units into the skin 2 times daily Yes TANESHA Ellis   diclofenac sodium (VOLTAREN) 1 % GEL Apply 4 g topically 4 times daily 340B Yes TANESHA Ellis   Needles & Syringes MISC 1 each by Does not apply route daily u-100 insulin syringe Yes TANESHA Ellis   FREESTYLE LANCETS MISC TEST TWO TIMES A DAY Yes TANESHA Ellis   FREESTYLE LITE strip TEST BLOOD SUGAR TWO TIMES A DAY Yes TANESHA Ellis   Respiratory Therapy Supplies [facility] by Does not apply route. Yes Jalen Ferrera MD       ASSESSMENT:  1. Uncontrolled type 2 diabetes mellitus with hyperglycemia (HCC)    2. Stage 3 chronic kidney disease, unspecified whether stage 3a or 3b CKD    3. Essential hypertension    4. Localized osteoarthritis of both knees        PLAN:  Orders Placed This Encounter   Medications    diclofenac sodium (VOLTAREN) 1 % GEL     Sig: Apply topically 2 times daily     Dispense:  300 g     Refill:  3       Patient Instructions   · Keep a list of your medicines with you. List all of the prescription medicines, nonprescription medicines, supplements, natural remedies, and vitamins that you take. Tell your healthcare providers who treat you about all of the products you are taking. Your provider can provide you with a form to keep track of them. Just ask. · Follow the directions that come with your medicine, including information about food or alcohol. Make sure you know how and when to take your medicine. Do not take more or less than you are supposed to take. · Keep all medicines out of the reach of children. · Store medicines according to the directions on the label. · Monitor yourself. Learn to know how your body reacts to your new medicine and keep track of how it makes you feel before attempting (If your provider has allowed you to do so) to drive or go to work. · Seek emergency medical attention if you think you have used too much of this medicine. An overdose of any prescription medicine can be fatal. Overdose symptoms may include extreme drowsiness, muscle weakness, confusion, cold and clammy skin, pinpoint pupils, shallow breathing, slow heart rate, fainting, or coma. · Don't share prescription medicines with others, even when they seem to have the same symptoms. What may be good for you may be harmful to others. · If you are no longer taking a prescribed medication and you have pills left please take your pills out of their original containers.  Mix crushed pills with an undesirable substance, such as cat litter or used coffee grounds. Put the mixture into a disposable container with a lid, such as an empty margarine tub, or into a sealable bag. Cover up or remove any of your personal information on the empty containers by covering it with black permanent marker or duct tape. Place the sealed container with the mixture, and the empty drug containers, in the trash. · If you use a medication that is in the form of a patch, dispose of used patches by folding them in half so that the sticky sides meet, and then flushing them down a toilet. They should not be placed in the household trash where children or pets can find them. · If you have any questions, ask your provider or pharmacist for more information. · Be sure to keep all appointments for provider visits or tests. We are committed to providing you with the best care possible. In order to help us achieve these goals please remember to bring all medications, herbal products, and over the counter supplements with you to each visit. If your provider has ordered testing for you, please be sure to follow up with our office if you have not received results within 7 days after the testing took place. *If you receive a survey after visiting one of our offices, please take time to share your experience concerning your physician office visit. These surveys are confidential and no health information about you is shared. We are eager to improve for you and we are counting on your feedback to help make that happen. Thank you for requesting your Continuity of Care Document (CCD) electronically. Please follow the instructions below to securely access your online medical record. [employer] allows you to send messages to your doctor, view your test results, renew your prescriptions, schedule appointments, and more. How Do I Access my CCD?   In your Internet browser, go to [email or web address] Enter your user name and password   Click on My medical Record  --> Download Summary --> Enter Password --> Download --> Save or Open Document    Additional Information  If you have questions, please contact your physician practice where you receive care. Remember, MyChart is NOT to be used for urgent needs. For medical emergencies, dial 911. Return in about 3 months (around 7/7/2021). Gale Earl CMA am scribing for and in the presence of TANESHA Finch on 4/19/2021 at 12:04 PM.      I, Song ALMENDAREZ, personally performed the services described in the documentation as scribed by Jorge Swan CMA, in my presence and it is both accurate and complete.

## 2021-04-07 NOTE — PATIENT INSTRUCTIONS
· Keep a list of your medicines with you. List all of the prescription medicines, nonprescription medicines, supplements, natural remedies, and vitamins that you take. Tell your healthcare providers who treat you about all of the products you are taking. Your provider can provide you with a form to keep track of them. Just ask. · Follow the directions that come with your medicine, including information about food or alcohol. Make sure you know how and when to take your medicine. Do not take more or less than you are supposed to take. · Keep all medicines out of the reach of children. · Store medicines according to the directions on the label. · Monitor yourself. Learn to know how your body reacts to your new medicine and keep track of how it makes you feel before attempting (If your provider has allowed you to do so) to drive or go to work. · Seek emergency medical attention if you think you have used too much of this medicine. An overdose of any prescription medicine can be fatal. Overdose symptoms may include extreme drowsiness, muscle weakness, confusion, cold and clammy skin, pinpoint pupils, shallow breathing, slow heart rate, fainting, or coma. · Don't share prescription medicines with others, even when they seem to have the same symptoms. What may be good for you may be harmful to others. · If you are no longer taking a prescribed medication and you have pills left please take your pills out of their original containers. Mix crushed pills with an undesirable substance, such as cat litter or used coffee grounds. Put the mixture into a disposable container with a lid, such as an empty margarine tub, or into a sealable bag. Cover up or remove any of your personal information on the empty containers by covering it with black permanent marker or duct tape. Place the sealed container with the mixture, and the empty drug containers, in the trash.    · If you use a medication that is in the form of a patch, dispose of used patches by folding them in half so that the sticky sides meet, and then flushing them down a toilet. They should not be placed in the household trash where children or pets can find them. · If you have any questions, ask your provider or pharmacist for more information. · Be sure to keep all appointments for provider visits or tests. We are committed to providing you with the best care possible. In order to help us achieve these goals please remember to bring all medications, herbal products, and over the counter supplements with you to each visit. If your provider has ordered testing for you, please be sure to follow up with our office if you have not received results within 7 days after the testing took place. *If you receive a survey after visiting one of our offices, please take time to share your experience concerning your physician office visit. These surveys are confidential and no health information about you is shared. We are eager to improve for you and we are counting on your feedback to help make that happen. Thank you for requesting your Continuity of Care Document (CCD) electronically. Please follow the instructions below to securely access your online medical record. [employer] allows you to send messages to your doctor, view your test results, renew your prescriptions, schedule appointments, and more. How Do I Access my CCD? In your Internet browser, go to [email or web address] Enter your user name and password   Click on My medical Record  --> Download Summary --> Enter Password --> Download --> Save or Open Document    Additional Information  If you have questions, please contact your physician practice where you receive care. Remember, [employer] is NOT to be used for urgent needs. For medical emergencies, dial 911.

## 2021-04-09 ENCOUNTER — HOSPITAL ENCOUNTER (OUTPATIENT)
Facility: HOSPITAL | Age: 66
Discharge: HOME | End: 2021-04-09
Payer: MEDICARE

## 2021-04-09 DIAGNOSIS — E03.9 HYPOTHYROIDISM, UNSPECIFIED TYPE: ICD-10-CM

## 2021-04-09 DIAGNOSIS — E11.65 UNCONTROLLED TYPE 2 DIABETES MELLITUS WITH HYPERGLYCEMIA (HCC): ICD-10-CM

## 2021-04-09 DIAGNOSIS — I10 ESSENTIAL HYPERTENSION: ICD-10-CM

## 2021-04-09 DIAGNOSIS — M10.9 GOUT, UNSPECIFIED CAUSE, UNSPECIFIED CHRONICITY, UNSPECIFIED SITE: ICD-10-CM

## 2021-04-09 PROCEDURE — 36415 COLL VENOUS BLD VENIPUNCTURE: CPT

## 2021-04-22 ENCOUNTER — HOSPITAL ENCOUNTER (OUTPATIENT)
Facility: HOSPITAL | Age: 66
Discharge: HOME | End: 2021-04-22
Payer: MEDICARE

## 2021-04-22 PROCEDURE — 36415 COLL VENOUS BLD VENIPUNCTURE: CPT

## 2021-04-26 DIAGNOSIS — N18.30 STAGE 3 CHRONIC KIDNEY DISEASE, UNSPECIFIED WHETHER STAGE 3A OR 3B CKD (HCC): Primary | ICD-10-CM

## 2021-07-07 ENCOUNTER — OFFICE VISIT (OUTPATIENT)
Dept: PRIMARY CARE CLINIC | Age: 66
End: 2021-07-07
Payer: MEDICARE

## 2021-07-07 VITALS
DIASTOLIC BLOOD PRESSURE: 78 MMHG | TEMPERATURE: 98 F | WEIGHT: 315 LBS | BODY MASS INDEX: 42.66 KG/M2 | HEIGHT: 72 IN | SYSTOLIC BLOOD PRESSURE: 130 MMHG | HEART RATE: 64 BPM | OXYGEN SATURATION: 97 %

## 2021-07-07 DIAGNOSIS — I10 ESSENTIAL HYPERTENSION: ICD-10-CM

## 2021-07-07 DIAGNOSIS — E11.65 UNCONTROLLED TYPE 2 DIABETES MELLITUS WITH HYPERGLYCEMIA (HCC): Primary | ICD-10-CM

## 2021-07-07 DIAGNOSIS — M17.0 LOCALIZED OSTEOARTHRITIS OF BOTH KNEES: ICD-10-CM

## 2021-07-07 DIAGNOSIS — N18.30 STAGE 3 CHRONIC KIDNEY DISEASE, UNSPECIFIED WHETHER STAGE 3A OR 3B CKD (HCC): ICD-10-CM

## 2021-07-07 LAB — HBA1C MFR BLD: 8.1 %

## 2021-07-07 PROCEDURE — 3017F COLORECTAL CA SCREEN DOC REV: CPT | Performed by: NURSE PRACTITIONER

## 2021-07-07 PROCEDURE — 83036 HEMOGLOBIN GLYCOSYLATED A1C: CPT | Performed by: NURSE PRACTITIONER

## 2021-07-07 PROCEDURE — 4040F PNEUMOC VAC/ADMIN/RCVD: CPT | Performed by: NURSE PRACTITIONER

## 2021-07-07 PROCEDURE — 99213 OFFICE O/P EST LOW 20 MIN: CPT | Performed by: NURSE PRACTITIONER

## 2021-07-07 PROCEDURE — G8427 DOCREV CUR MEDS BY ELIG CLIN: HCPCS | Performed by: NURSE PRACTITIONER

## 2021-07-07 PROCEDURE — 1036F TOBACCO NON-USER: CPT | Performed by: NURSE PRACTITIONER

## 2021-07-07 PROCEDURE — 3052F HG A1C>EQUAL 8.0%<EQUAL 9.0%: CPT | Performed by: NURSE PRACTITIONER

## 2021-07-07 PROCEDURE — G8417 CALC BMI ABV UP PARAM F/U: HCPCS | Performed by: NURSE PRACTITIONER

## 2021-07-07 PROCEDURE — 2022F DILAT RTA XM EVC RTNOPTHY: CPT | Performed by: NURSE PRACTITIONER

## 2021-07-07 PROCEDURE — 1123F ACP DISCUSS/DSCN MKR DOCD: CPT | Performed by: NURSE PRACTITIONER

## 2021-07-07 RX ORDER — LANCETS 30 GAUGE
1 EACH MISCELLANEOUS DAILY
Qty: 300 EACH | Refills: 3 | Status: SHIPPED | OUTPATIENT
Start: 2021-07-07

## 2021-07-07 RX ORDER — BLOOD-GLUCOSE METER
1 KIT MISCELLANEOUS DAILY
Qty: 1 KIT | Refills: 0 | Status: SHIPPED | OUTPATIENT
Start: 2021-07-07

## 2021-07-07 RX ORDER — GLUCOSAMINE HCL/CHONDROITIN SU 500-400 MG
CAPSULE ORAL
Qty: 300 STRIP | Refills: 3 | Status: SHIPPED | OUTPATIENT
Start: 2021-07-07

## 2021-07-07 SDOH — ECONOMIC STABILITY: FOOD INSECURITY: WITHIN THE PAST 12 MONTHS, THE FOOD YOU BOUGHT JUST DIDN'T LAST AND YOU DIDN'T HAVE MONEY TO GET MORE.: SOMETIMES TRUE

## 2021-07-07 SDOH — ECONOMIC STABILITY: FOOD INSECURITY: WITHIN THE PAST 12 MONTHS, YOU WORRIED THAT YOUR FOOD WOULD RUN OUT BEFORE YOU GOT MONEY TO BUY MORE.: SOMETIMES TRUE

## 2021-07-07 ASSESSMENT — ENCOUNTER SYMPTOMS
ABDOMINAL PAIN: 0
COUGH: 0
VOMITING: 0
NAUSEA: 0
SORE THROAT: 0
EYE PAIN: 0
SHORTNESS OF BREATH: 0

## 2021-07-07 ASSESSMENT — SOCIAL DETERMINANTS OF HEALTH (SDOH): HOW HARD IS IT FOR YOU TO PAY FOR THE VERY BASICS LIKE FOOD, HOUSING, MEDICAL CARE, AND HEATING?: HARD

## 2021-07-07 NOTE — PROGRESS NOTES
Health Maintenance Due This Visit   Colonoscopy No   Mammogram No   Annual Wellness Visit No   Microalbumin No   HgbA1C No   Diabetic Eye Exam No    House Bill One Due This Visit   WEN No   UDS No   Contract No    Chief Complaint   Patient presents with    Diabetes    Hypertension    Hypothyroidism     Pt here today for f/u on DM, htn, and hypothyroidism    Have you seen any other physician or provider since your last visit no    Have you had any other diagnostic tests since your last visit? no    Have you changed or stopped any medications since your last visit? no     SUBJECTIVE:    Patient ID: Kody Mckee is a 74 y.o. male. Medical History Review  Past Medical, Family, and Social History reviewed. Health Maintenance Due   Topic Date Due    Diabetic foot exam  Never done    Diabetic retinal exam  Never done    DTaP/Tdap/Td vaccine (1 - Tdap) Never done    Shingles Vaccine (1 of 2) Never done    COVID-19 Vaccine (2 - Moderna 2-dose series) 04/08/2021       HPI:   Chief Complaint   Patient presents with    Diabetes    Hypertension    Hypothyroidism   He states his sugar has been good, usually 120/140s fasting. It dropped to 77 the other morning. The highest has been around 240. He states he does not have much energy. 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 Patient's medications, allergies, past medical, surgical, social and family histories were reviewed and updated as appropriate. Review of Systems   Constitutional: Positive for fatigue. Negative for chills and fever. HENT: Negative for ear pain and sore throat. Eyes: Negative for pain and visual disturbance. Respiratory: Negative for cough and shortness of breath. Cardiovascular: Negative for chest pain, palpitations and leg swelling. Gastrointestinal: Negative for abdominal pain, nausea and vomiting. Genitourinary: Negative for dysuria and hematuria. Musculoskeletal: Negative for joint swelling. Skin: Negative for rash. Neurological: Negative for dizziness and weakness. Psychiatric/Behavioral: Negative for sleep disturbance. Reviewed and acurate. See MA note. OBJECTIVE:  /78 (Site: Left Upper Arm, Position: Sitting)   Pulse 64   Temp 98 °F (36.7 °C) (Temporal)   Ht 6' (1.829 m)   Wt (!) 385 lb 12.8 oz (175 kg)   SpO2 97%   BMI 52.32 kg/m²    Physical Exam  Vitals reviewed. Constitutional:       General: He is not in acute distress. Appearance: He is well-developed. HENT:      Head: Normocephalic. Mouth/Throat:      Pharynx: No oropharyngeal exudate. Eyes:      General: Lids are normal.   Cardiovascular:      Rate and Rhythm: Normal rate and regular rhythm. Heart sounds: Normal heart sounds. Pulmonary:      Effort: Pulmonary effort is normal.      Breath sounds: Normal breath sounds. Abdominal:      General: Bowel sounds are normal. There is no distension. Palpations: Abdomen is soft. Tenderness: There is no abdominal tenderness. Musculoskeletal:      Cervical back: Neck supple. Lymphadenopathy:      Cervical: No cervical adenopathy. Skin:     General: Skin is warm and dry. Neurological:      Mental Status: He is alert and oriented to person, place, and time. No results found for requested labs within last 30 days. Hemoglobin A1C (%)   Date Value   07/07/2021 8.1     Microscopic Examination (no units)   Date Value   05/18/2020 YES     LDL Calculated (mg/dL)   Date Value   04/22/2021 73       Lab Results   Component Value Date    WBC 9.9 03/06/2018    NEUTROABS 4.6 03/06/2018    HGB 15.9 03/06/2018    HCT 47.6 03/06/2018    MCV 88.0 03/06/2018     03/06/2018     Lab Results   Component Value Date    TSH 4.150 04/22/2021       Prior to Visit Medications    Medication Sig Taking?  Authorizing Provider   diclofenac sodium (VOLTAREN) 1 % GEL Apply topically 2 times daily Yes TANESHA Peters   glucose monitoring (FREESTYLE FREEDOM) kit 1 kit by Does not apply route daily Sub covered brand BID and PRN Dx: DM2 Yes TANESHA Patel   blood glucose monitor strips BID and PRN Dx: DM2 Yes TANESHA Patel   Lancets MISC 1 each by Does not apply route daily BID PRN Dx:DM2 Yes TANESHA Patel   LANTUS SOLOSTAR 100 UNIT/ML injection pen INJECT 50 UNITS INTO THE SKIN 2 TIMES DAILY Yes TANESHA Patel   traZODone (DESYREL) 100 MG tablet TAKE ONE TABLET BY MOUTH NIGHTLY for sleep Yes TANESHA Patel   levothyroxine (SYNTHROID) 88 MCG tablet Take 1 tablet by mouth daily thyroid Yes TANESHA Patel   Insulin Pen Needle (UNIFINE PENTIPS) 31G X 6 MM MISC USE TO INJECT INSULIN EVERY DAY Yes TANESHA Patel   carvedilol (COREG) 25 MG tablet Take 1 tablet by mouth 2 times daily For blood pressure Yes TANESHA Patel   terazosin (HYTRIN) 5 MG capsule Take 1 capsule by mouth nightly Yes TANESHA Patel   omeprazole (PRILOSEC) 40 MG delayed release capsule Take 1 capsule by mouth daily.  For stomach Yes TANESHA Patel   linagliptin (TRADJENTA) 5 MG tablet TAKE 1 TABLET BY MOUTH DAILY for sugar Yes TANESHA Patel   glipiZIDE (GLUCOTROL) 10 MG tablet TAKE TWO TABLETS BY MOUTH TWO TIMES A DAY for sugar Yes TANESHA Patel   lisinopril (PRINIVIL;ZESTRIL) 40 MG tablet TAKE 1 TABLET BY MOUTH DAILY for blood pressure Yes TANESHA Patel   pravastatin (PRAVACHOL) 80 MG tablet TAKE 1 TABLET BY MOUTH EVERY EVENING for cholesterol Yes TANESHA Patel   allopurinol (ZYLOPRIM) 100 MG tablet Take 1 tablet by mouth daily For gout Yes TANESHA Patel   metOLazone (ZAROXOLYN) 2.5 MG tablet Take 1 tablet by mouth every other day For swelling Yes TANESHA Patel   diclofenac sodium (VOLTAREN) 1 % GEL Apply 4 g topically 4 times daily 340B Yes TANESHA Patel   Needles & Syringes MISC 1 each by Does not apply route daily u-100 insulin syringe Yes Claire Isaac, APRN   FREESTYLE LANCETS MISC TEST TWO TIMES A DAY Yes Greg Fent TANESHA Disla   FREESTYLE LITE strip TEST BLOOD SUGAR TWO TIMES A DAY Yes TANESHA Gonzalez   Respiratory Therapy Supplies [facility] by Does not apply route. Yes Clay Uriostegui MD       ASSESSMENT:  1. Uncontrolled type 2 diabetes mellitus with hyperglycemia (HCC)    2. Stage 3 chronic kidney disease, unspecified whether stage 3a or 3b CKD ([facility] [address]    3. Essential hypertension    4. Localized osteoarthritis of both knees        PLAN:  Orders Placed This Encounter   Medications    diclofenac sodium (VOLTAREN) 1 % GEL     Sig: Apply topically 2 times daily     Dispense:  300 g     Refill:  3    glucose monitoring (FREESTYLE FREEDOM) kit     Si kit by Does not apply route daily Sub covered brand BID and PRN Dx: DM2     Dispense:  1 kit     Refill:  0    blood glucose monitor strips     Sig: BID and PRN Dx: DM2     Dispense:  300 strip     Refill:  3     Brand per patient preference. May round up to next available package size.  Lancets MISC     Si each by Does not apply route daily BID PRN Dx:DM2     Dispense:  300 each     Refill:  3     Orders Placed This Encounter   Procedures    POCT glycosylated hemoglobin (Hb A1C)     Patient Instructions   Gradually increase insulin 70u. F/U 3 months.

## 2021-08-26 ENCOUNTER — TELEPHONE (OUTPATIENT)
Dept: PRIMARY CARE CLINIC | Age: 66
End: 2021-08-26

## 2021-08-26 DIAGNOSIS — R05.9 COUGH: Primary | ICD-10-CM

## 2021-08-26 RX ORDER — AZITHROMYCIN 250 MG/1
250 TABLET, FILM COATED ORAL SEE ADMIN INSTRUCTIONS
Qty: 6 TABLET | Refills: 0 | Status: SHIPPED | OUTPATIENT
Start: 2021-08-26 | End: 2021-08-31

## 2021-08-26 NOTE — TELEPHONE ENCOUNTER
----- Message from Eliza Atkins sent at 8/26/2021  9:42 AM EDT -----  Subject: Message to Provider    QUESTIONS  Information for Provider? Patient called to try and get a covid test done,   but he does not believe he has coivd. He believes is a common flue/ cold   and wanted to know if his pcp can call him a z william.   ---------------------------------------------------------------------------  --------------  CALL BACK INFO  What is the best way for the office to contact you? OK to leave message on   voicemail  Preferred Call Back Phone Number? [phone]  ---------------------------------------------------------------------------  --------------  SCRIPT ANSWERS  Relationship to Patient?  Self

## 2021-09-23 ENCOUNTER — TELEPHONE (OUTPATIENT)
Dept: PRIMARY CARE CLINIC | Age: 66
End: 2021-09-23

## 2021-09-23 NOTE — TELEPHONE ENCOUNTER
----- Message from Young Covington sent at 9/23/2021 11:31 AM EDT -----  Subject: Refill Request    QUESTIONS  Name of Medication? Other - Freestyle Teri  Patient-reported dosage and instructions? Check diabetic levels a few   times a day   How many days do you have left? 0  Pref[facility] phone number (if available)? [phone]  Additional Information for Provider? Brissa Ridley from [address] [place] [address] faxed in   form for freestyle teri, on 9/22/21 that needs to be signed by PCP Trinh Vegas. They will send another fax. This is urgent. Brissa Ridley would like a call back   P? [phone]  ---------------------------------------------------------------------------  --------------  CALL BACK INFO  What is the best way for the office to contact you? Do not leave any   message, patient will call back for answer  Preferred Call Back Phone Number?  [phone]

## 2021-09-23 NOTE — TELEPHONE ENCOUNTER
----- Message from Roberth Holman sent at 9/23/2021 11:31 AM EDT -----  Subject: Refill Request    QUESTIONS  Name of Medication? Other - Freestyle Teri  Patient-reported dosage and instructions? Check diabetic levels a few   times a day   How many days do you have left? 0  Preferred [facility]? [facility] phone number (if available)? [phone]  Additional Information for Provider? Rosa Cuadra from [address] [place] [address] faxed in   form for freestyle teri, on 9/22/21 that needs to be signed by PCP Estrella Gant. They will send another fax. This is urgent. Rosa Cuadra would like a call back   P? [phone]  ---------------------------------------------------------------------------  --------------  CALL BACK INFO  What is the best way for the office to contact you? Do not leave any   message, patient will call back for answer  Preferred Call Back Phone Number?  [phone]

## 2021-09-25 ASSESSMENT — PATIENT HEALTH QUESTIONNAIRE - PHQ9
2. FEELING DOWN, DEPRESSED OR HOPELESS: 0
1. LITTLE INTEREST OR PLEASURE IN DOING THINGS: 0
SUM OF ALL RESPONSES TO PHQ9 QUESTIONS 1 & 2: 0
SUM OF ALL RESPONSES TO PHQ QUESTIONS 1-9: 0

## 2021-09-25 ASSESSMENT — LIFESTYLE VARIABLES
HOW OFTEN DO YOU HAVE A DRINK CONTAINING ALCOHOL: 0
AUDIT TOTAL SCORE: INCOMPLETE
HOW OFTEN DO YOU HAVE A DRINK CONTAINING ALCOHOL: NEVER
AUDIT-C TOTAL SCORE: INCOMPLETE

## 2021-09-25 NOTE — PROGRESS NOTES
Completed HRA and healthcare decision maker. Patient does not have living will, please provide information.

## 2021-09-28 ENCOUNTER — OFFICE VISIT (OUTPATIENT)
Dept: PRIMARY CARE CLINIC | Age: 66
End: 2021-09-28
Payer: MEDICARE

## 2021-09-28 VITALS
WEIGHT: 315 LBS | OXYGEN SATURATION: 98 % | DIASTOLIC BLOOD PRESSURE: 80 MMHG | HEART RATE: 75 BPM | TEMPERATURE: 97.7 F | SYSTOLIC BLOOD PRESSURE: 130 MMHG | HEIGHT: 72 IN | BODY MASS INDEX: 42.66 KG/M2

## 2021-09-28 DIAGNOSIS — Z00.00 ROUTINE GENERAL MEDICAL EXAMINATION AT A HEALTH CARE FACILITY: Primary | ICD-10-CM

## 2021-09-28 PROCEDURE — 90694 VACC AIIV4 NO PRSRV 0.5ML IM: CPT | Performed by: NURSE PRACTITIONER

## 2021-09-28 PROCEDURE — 4040F PNEUMOC VAC/ADMIN/RCVD: CPT | Performed by: NURSE PRACTITIONER

## 2021-09-28 PROCEDURE — G0008 ADMIN INFLUENZA VIRUS VAC: HCPCS | Performed by: NURSE PRACTITIONER

## 2021-09-28 PROCEDURE — 3017F COLORECTAL CA SCREEN DOC REV: CPT | Performed by: NURSE PRACTITIONER

## 2021-09-28 PROCEDURE — 1123F ACP DISCUSS/DSCN MKR DOCD: CPT | Performed by: NURSE PRACTITIONER

## 2021-09-28 PROCEDURE — G0439 PPPS, SUBSEQ VISIT: HCPCS | Performed by: NURSE PRACTITIONER

## 2021-09-28 NOTE — PROGRESS NOTES
Health Maintenance Due This Visit   Colonoscopy No   Mammogram No   Annual Wellness Visit No   Microalbumin No   HgbA1C No   Diabetic Eye Exam yes- pt will schedule     House Bill One Due This Visit   WEN No   UDS No   Contract No        Chief Complaint   Patient presents with    Medicare AWV     Pt here today for AWV. FS running \"up and down\"    Have you seen any other physician or provider since your last visit no    Have you had any other diagnostic tests since your last visit? no    Have you changed or stopped any medications since your last visit? no       Medicare Annual Wellness Visit  Name: Kristin Gallardo Date: 10/10/2021   MRN: [id number] Sex: Male   Age: 74 y.o. Ethnicity: Non- / Non    : 1955 Race: White (non-)      Ayala Whitney is here for Medicare AWV  His BP is getting low at times. His sugar is also on the low end sometimes- 70s in the morning- happening pretty regularly. He wants to get a continuous meter. Screenings for behavioral, psychosocial and functional/safety risks, and cognitive dysfunction are all negative except as indicated below. These results, as well as other patient data from the [address] form, are documented in Flowsheets linked to this Encounter. Allergies   Allergen Reactions    Atenolol Other (See Comments)     dizzy       Prior to Visit Medications    Medication Sig Taking?  Authorizing Provider   diclofenac sodium (VOLTAREN) 1 % GEL Apply topically 2 times daily Yes TANESHA Lyon   glucose monitoring (FREESTYLE FREEDOM) kit 1 kit by Does not apply route daily Sub covered brand BID and PRN Dx: DM2 Yes TANESHA Lyon   blood glucose monitor strips BID and PRN Dx: DM2 Yes TANESHA Lyon   Lancets MISC 1 each by Does not apply route daily BID PRN Dx:DM2 Yes TANESHA Lyon   LANTUS SOLOSTAR 100 UNIT/ML injection pen INJECT 50 UNITS INTO THE SKIN 2 TIMES DAILY Yes TANESHA Lyon   traZODone (DESYREL) 100 MG tablet TAKE ONE TABLET BY MOUTH NIGHTLY for sleep Yes Rossymary Herr, TANESHA   levothyroxine (SYNTHROID) 88 MCG tablet Take 1 tablet by mouth daily thyroid Yes Rossy Nemesio, APRN   Insulin Pen Needle (UNIFINE PENTIPS) 31G X 6 MM MISC USE TO INJECT INSULIN EVERY DAY Yes Rossymary Herr, APRN   carvedilol (COREG) 25 MG tablet Take 1 tablet by mouth 2 times daily For blood pressure Yes Rossymary Herr, APRN   terazosin (HYTRIN) 5 MG capsule Take 1 capsule by mouth nightly Yes Rossymary Herr, APRN   omeprazole (PRILOSEC) 40 MG delayed release capsule Take 1 capsule by mouth daily. For stomach Yes Rossymary Herr, APRN   linagliptin (TRADJENTA) 5 MG tablet TAKE 1 TABLET BY MOUTH DAILY for sugar Yes Rossymary Herr, APRN   glipiZIDE (GLUCOTROL) 10 MG tablet TAKE TWO TABLETS BY MOUTH TWO TIMES A DAY for sugar Yes Rossymary Herr, APRN   lisinopril (PRINIVIL;ZESTRIL) 40 MG tablet TAKE 1 TABLET BY MOUTH DAILY for blood pressure Yes Rossymary Herr, TANESHA   pravastatin (PRAVACHOL) 80 MG tablet TAKE 1 TABLET BY MOUTH EVERY EVENING for cholesterol Yes Rossymary Herr, APRN   allopurinol (ZYLOPRIM) 100 MG tablet Take 1 tablet by mouth daily For gout Yes Rossymary Herr, APRN   metOLazone (ZAROXOLYN) 2.5 MG tablet Take 1 tablet by mouth every other day For swelling Yes Rossy Nemesio, TANESHA   diclofenac sodium (VOLTAREN) 1 % GEL Apply 4 g topically 4 times daily 340B Yes Rossy Nemesio, TANESHA   Needles & Syringes MISC 1 each by Does not apply route daily u-100 insulin syringe Yes Rossymary Herr, TANESHA   FREESTYLE LANCETS MISC TEST TWO TIMES A DAY Yes Rossy Nemesio, TANESHA   FREESTYLE LITE strip TEST BLOOD SUGAR TWO TIMES A DAY Yes Rossy Herr, TANESHA   Respiratory Therapy Supplies PREMA by Does not apply route.  Yes Olive Moreland MD       Past Medical History:   Diagnosis Date    Chronic back pain     feet,hands,legs,knees    Chronic kidney disease     Stage III    DDD (degenerative disc disease), lumbar     GERD (gastroesophageal reflux disease)     Hypercholesteremia     Hypertension     Left lumbar radiculopathy     Obesity     Type II or unspecified type diabetes mellitus without mention of complication, not stated as uncontrolled 2011    Unspecified sleep apnea      No past surgical history on file. Family History   Problem Relation Age of Onset    Diabetes Mother     Cancer Father         lung     Review of Systems   Constitutional: Negative for chills and fever. HENT: Negative for ear pain and sore throat. Eyes: Negative for pain and visual disturbance. Respiratory: Negative for cough and shortness of breath. Cardiovascular: Negative for chest pain, palpitations and leg swelling. Gastrointestinal: Negative for abdominal pain, nausea and vomiting. Genitourinary: Negative for dysuria and hematuria. Musculoskeletal: Negative for joint swelling. Skin: Negative for rash. Neurological: Negative for dizziness and weakness. Psychiatric/Behavioral: Negative for sleep disturbance. CareTeam (Including outside providers/suppliers regularly involved in providing care):   Patient Care Team:  TANESHA Sanchez as PCP - General (Nurse Practitioner Family)  TANESHA Sanchez as PCP - [facility] Empaneled Provider    Wt Readings from Last 3 Encounters:   09/28/21 (!) 388 lb (176 kg)   07/07/21 (!) 385 lb 12.8 oz (175 kg)   04/07/21 (!) 382 lb (173.3 kg)     Vitals:    09/28/21 1121 09/28/21 1202   BP: (!) 144/80 130/80   Site: Left Upper Arm    Position: Sitting    Pulse: 75    Temp: 97.7 °F (36.5 °C)    TempSrc: Temporal    SpO2: 98%    Weight: (!) 388 lb (176 kg)    Height: 6' (1.829 m)      Body mass index is 52.62 kg/m². Based upon direct observation of the patient, evaluation of cognition reveals recent and remote memory intact. Physical Exam  Vitals reviewed. Constitutional:       General: He is not in acute distress. Appearance: He is well-developed.    HENT:      Head: Normocephalic. Mouth/Throat:      Pharynx: No oropharyngeal exudate. Eyes:      General: Lids are normal.   Cardiovascular:      Rate and Rhythm: Normal rate and regular rhythm. Heart sounds: Normal heart sounds. Pulmonary:      Effort: Pulmonary effort is normal.      Breath sounds: Normal breath sounds. Abdominal:      General: Bowel sounds are normal. There is no distension. Palpations: Abdomen is soft. Tenderness: There is no abdominal tenderness. Musculoskeletal:      Cervical back: Neck supple. Lymphadenopathy:      Cervical: No cervical adenopathy. Skin:     General: Skin is warm and dry. Neurological:      Mental Status: He is alert and oriented to person, place, and time. Lab Results   Component Value Date     04/22/2021    K 4.9 04/22/2021     04/22/2021    CO2 24 04/22/2021    GLUCOSE 202 04/22/2021    BUN 30 04/22/2021    CREATININE 1.59 04/22/2021    CALCIUM 8.9 04/22/2021    PROT 7.0 04/22/2021    LABALBU 4.0 04/22/2021    LABALBU 4.4 08/03/2011    BILITOT 0.3 04/22/2021    BILITOT 0.5 08/03/2011    ALT 19 04/22/2021    AST 15 04/22/2021       Hemoglobin A1C (%)   Date Value   07/07/2021 8.1     Microscopic Examination (no units)   Date Value   05/18/2020 YES     LDL Calculated (mg/dL)   Date Value   04/22/2021 73         Lab Results   Component Value Date    WBC 9.9 03/06/2018    NEUTROABS 4.6 03/06/2018    HGB 15.9 03/06/2018    HCT 47.6 03/06/2018    MCV 88.0 03/06/2018     03/06/2018       Lab Results   Component Value Date    TSH 4.150 04/22/2021       Patient's complete Health Risk Assessment and screening values have been reviewed and are found in Flowsheets. The following problems were reviewed today and where indicated follow up appointments were made and/or referrals ordered.     Positive Risk Factor Screenings with Interventions:     Fall Risk:  2 or more falls in past year?: (!) yes  Fall with injury in past year?: no  Fall Risk Interventions:    · Home safety tips provided          General Health and ACP:  General  In general, how would you say your health is?: Fair  In the past 7 days, have you experienced any of the following?  New or Increased Pain, New or Increased Fatigue, Loneliness, Social Isolation, Stress or Anger?: None of These  Do you get the social and emotional support that you need?: Yes  Do you have a Living Will?: (!) No  Advance Directives     Power of [facility] Will ACP-Advance Directive ACP-Power of     Not on File Not on File Not on File Not on File      General Health Risk Interventions:  · none    Health Habits/Nutrition:  Health Habits/Nutrition  Do you exercise for at least 20 minutes 2-3 times per week?: (!) No  Have you lost any weight without trying in the past 3 months?: No  Do you eat only one meal per day?: No  Have you seen the dentist within the past year?: (!) No  Body mass index: (!) 52.62  Health Habits/Nutrition Interventions:      Hearing/Vision:  No exam data present  Hearing/Vision  Do you or your family notice any trouble with your hearing that hasn't been managed with hearing aids?: No  Do you have difficulty driving, watching TV, or doing any of your daily activities because of your eyesight?: (!) Yes  Have you had an eye exam within the past year?: (!) No  Hearing/Vision Interventions:  · none      Personalized Preventive Plan   Current Health Maintenance Status  Immunization History   Administered Date(s) Administered    COVID-19, Moderna, PF, 100mcg/0.5mL 03/11/2021    Influenza, Quadv, IM, (6 mo and older Fluzone, Flulaval, Fluarix and 3 yrs and older Afluria) 09/24/2020    Influenza, Quadv, adjuvanted, 65 yrs +, IM, PF (Fluad) 09/28/2021    Pneumococcal Polysaccharide ([id number]) 08/20/2020        Health Maintenance   Topic Date Due    Diabetic foot exam  Never done    Diabetic retinal exam  Never done    DTaP/Tdap/Td vaccine (1 - Tdap) Never done    Shingles Vaccine (1 of 2) Never done    COVID-19 Vaccine (2 - Moderna 2-dose series) 04/08/2021    Annual Wellness Visit (AWV)  09/25/2021    Lipid screen  04/22/2022    TSH testing  04/22/2022    Potassium monitoring  04/22/2022    Creatinine monitoring  04/22/2022    A1C test (Diabetic or Prediabetic)  07/07/2022    Colon cancer screen fecal DNA test (Cologuard)  10/13/2023    Flu vaccine  Completed    Pneumococcal 65+ years Vaccine  Completed    Hepatitis C screen  Completed    Hepatitis A vaccine  Aged Out    Hib vaccine  Aged Out    Meningococcal (ACWY) vaccine  Aged Out     Recommendations for [employer] Due: see orders and patient instructions/AVS.    1. Routine general medical examination at a health care facility         No orders of the defined types were placed in this encounter. Orders Placed This Encounter   Procedures    INFLUENZA, QUADV, ADJUVANTED, 72 YRS =, IM, PF, PREFILL SYR, 0.5ML (FLUAD)     Patient Instructions   Have labs drawn 10/20/21 at the new office. Have EKG done before your next appointment. Personalized Preventive Plan for Goldie Koehler - 9/28/2021  Medicare offers a range of preventive health benefits. Some of the tests and screenings are paid in full while other may be subject to a deductible, co-insurance, and/or copay. Some of these benefits include a comprehensive review of your medical history including lifestyle, illnesses that may run in your family, and various assessments and screenings as appropriate. After reviewing your medical record and screening and assessments performed today your provider may have ordered immunizations, labs, imaging, and/or referrals for you. A list of these orders (if applicable) as well as your Preventive Care list are included within your After Visit Summary for your review.     Other Preventive Recommendations:    · A preventive eye exam performed by an eye specialist is recommended every 1-2 years to screen for glaucoma; cataracts, macular degeneration, and other eye disorders. · A preventive dental visit is recommended every 6 months. · Try to get at least 150 minutes of exercise per week or 10,000 steps per day on a pedometer . · Order or download the FREE \"Exercise & Physical Activity: Your Everyday Guide\" from [employer]. Call 0-378.812.4564 or search [employer] online. · You need 6011-4598 mg of calcium and 6490-3712 IU of vitamin D per day. It is possible to meet your calcium requirement with diet alone, but a vitamin D supplement is usually necessary to meet this goal.  · When exposed to the sun, use a sunscreen that protects against both UVA and UVB radiation with an SPF of 30 or greater. Reapply every 2 to 3 hours or after sweating, drying off with a towel, or swimming. · Always wear a seat belt when traveling in a car. Always wear a helmet when riding a bicycle or motorcycle. Return in 3 months (on 12/28/2021) for Medicare Annual Wellness Visit in 1 year.

## 2021-09-28 NOTE — PATIENT INSTRUCTIONS
Have labs drawn 10/20/21 at the [facility] office. Have EKG done before your next appointment. Personalized Preventive Plan for Krish Mcduffie - 9/28/2021  Medicare offers a range of preventive health benefits. Some of the tests and screenings are paid in full while other may be subject to a deductible, co-insurance, and/or copay. Some of these benefits include a comprehensive review of your medical history including lifestyle, illnesses that may run in your family, and various assessments and screenings as appropriate. After reviewing your medical record and screening and assessments performed today your provider may have ordered immunizations, labs, imaging, and/or referrals for you. A list of these orders (if applicable) as well as your Preventive Care list are included within your After Visit Summary for your review. Other Preventive Recommendations:    · A preventive eye exam performed by an eye specialist is recommended every 1-2 years to screen for glaucoma; cataracts, macular degeneration, and other eye disorders. · A preventive dental visit is recommended every 6 months. · Try to get at least 150 minutes of exercise per week or 10,000 steps per day on a pedometer . · Order or download the FREE \"Exercise & Physical Activity: Your Everyday Guide\" from [employer]. Call 7-472.370.1752 or search [employer] online. · You need 1000-7672 mg of calcium and 9241-5067 IU of vitamin D per day. It is possible to meet your calcium requirement with diet alone, but a vitamin D supplement is usually necessary to meet this goal.  · When exposed to the sun, use a sunscreen that protects against both UVA and UVB radiation with an SPF of 30 or greater. Reapply every 2 to 3 hours or after sweating, drying off with a towel, or swimming. · Always wear a seat belt when traveling in a car. Always wear a helmet when riding a bicycle or motorcycle.

## 2021-09-28 NOTE — PROGRESS NOTES
Vaccine Information Sheet, \"Influenza - Inactivated\"  given to Henretta Holter, or parent/legal guardian of  Henretta Holter and verbalized understanding. Patient responses:    Have you ever had a reaction to a flu vaccine? No  Do you have any current illness? No  Have you ever had Guillian [place] Syndrome? No  Do you have a serious allergy to any of the follow: Neomycin, Polymyxin, Thimerosal, eggs or egg products? No    Flu vaccine given per order. Please see immunization tab. Risks and benefits explained. Current VIS given.       Immunizations Administered     Name Date Dose Route    Influenza, Quadv, adjuvanted, 65 yrs +, IM, PF (Fluad) 9/28/2021 0.5 mL Intramuscular    Site: Deltoid- Right    Lot: [id number]    [address]: 34122-697-59

## 2021-10-10 ASSESSMENT — ENCOUNTER SYMPTOMS
NAUSEA: 0
SORE THROAT: 0
EYE PAIN: 0
COUGH: 0
VOMITING: 0
ABDOMINAL PAIN: 0
SHORTNESS OF BREATH: 0

## 2021-12-13 ENCOUNTER — TRANSCRIBE ORDERS (OUTPATIENT)
Dept: ADMINISTRATIVE | Facility: HOSPITAL | Age: 66
End: 2021-12-13

## 2021-12-13 DIAGNOSIS — N18.30 STAGE 3 CHRONIC KIDNEY DISEASE, UNSPECIFIED WHETHER STAGE 3A OR 3B CKD (HCC): Primary | ICD-10-CM

## 2022-01-05 ENCOUNTER — HOSPITAL ENCOUNTER (OUTPATIENT)
Dept: ULTRASOUND IMAGING | Facility: HOSPITAL | Age: 67
Discharge: HOME | End: 2022-01-05
Admitting: INTERNAL MEDICINE

## 2022-01-05 DIAGNOSIS — N18.30 STAGE 3 CHRONIC KIDNEY DISEASE, UNSPECIFIED WHETHER STAGE 3A OR 3B CKD: ICD-10-CM

## 2022-01-05 PROCEDURE — 76775 US EXAM ABDO BACK WALL LIM: CPT

## 2022-09-11 ENCOUNTER — APPOINTMENT (OUTPATIENT)
Dept: GENERAL RADIOLOGY | Facility: HOSPITAL | Age: 67
End: 2022-09-11
Payer: MEDICARE

## 2022-09-11 ENCOUNTER — HOSPITAL ENCOUNTER (EMERGENCY)
Facility: HOSPITAL | Age: 67
Discharge: HOME | End: 2022-09-11
Attending: EMERGENCY MEDICINE
Payer: MEDICARE

## 2022-09-11 ENCOUNTER — APPOINTMENT (OUTPATIENT)
Dept: CT IMAGING | Facility: HOSPITAL | Age: 67
End: 2022-09-11
Payer: MEDICARE

## 2022-09-11 VITALS
RESPIRATION RATE: 27 BRPM | SYSTOLIC BLOOD PRESSURE: 148 MMHG | TEMPERATURE: 98.2 F | OXYGEN SATURATION: 91 % | BODY MASS INDEX: 42.66 KG/M2 | DIASTOLIC BLOOD PRESSURE: 74 MMHG | HEIGHT: 72 IN | HEART RATE: 69 BPM | WEIGHT: 315 LBS

## 2022-09-11 DIAGNOSIS — R42 DIZZINESS: Primary | ICD-10-CM

## 2022-09-11 DIAGNOSIS — R42 VERTIGO: ICD-10-CM

## 2022-09-11 LAB
A/G RATIO: 1.5 (ref 0.8–2)
ALBUMIN SERPL-MCNC: 4.3 G/DL (ref 3.4–4.8)
ALP BLD-CCNC: 123 U/L (ref 25–100)
ALT SERPL-CCNC: <5 U/L (ref 4–36)
ANION GAP SERPL CALCULATED.3IONS-SCNC: 9 MMOL/L (ref 3–16)
AST SERPL-CCNC: 12 U/L (ref 8–33)
BASOPHILS ABSOLUTE: 0.1 K/UL (ref 0–0.1)
BASOPHILS RELATIVE PERCENT: 1.2 %
BILIRUB SERPL-MCNC: 0.3 MG/DL (ref 0.3–1.2)
BUN BLDV-MCNC: 21 MG/DL (ref 6–20)
CALCIUM SERPL-MCNC: 8.9 MG/DL (ref 8.5–10.5)
CHLORIDE BLD-SCNC: 98 MMOL/L (ref 98–107)
CO2: 28 MMOL/L (ref 20–30)
CREAT SERPL-MCNC: 1.5 MG/DL (ref 0.4–1.2)
EOSINOPHILS ABSOLUTE: 1.9 K/UL (ref 0–0.4)
EOSINOPHILS RELATIVE PERCENT: 18.3 %
GFR AFRICAN AMERICAN: 56
GFR NON-AFRICAN AMERICAN: 46
GLOBULIN: 2.9 G/DL
GLUCOSE BLD-MCNC: 144 MG/DL (ref 74–106)
HCT VFR BLD CALC: 38.5 % (ref 40–54)
HEMOGLOBIN: 12.6 G/DL (ref 13–18)
IMMATURE GRANULOCYTES #: 0 K/UL
IMMATURE GRANULOCYTES %: 0.3 % (ref 0–5)
LIPASE: 33 U/L (ref 5.6–51.3)
LYMPHOCYTES ABSOLUTE: 2.5 K/UL (ref 1.5–4)
LYMPHOCYTES RELATIVE PERCENT: 23.4 %
MCH RBC QN AUTO: 29.4 PG (ref 27–32)
MCHC RBC AUTO-ENTMCNC: 32.7 G/DL (ref 31–35)
MCV RBC AUTO: 89.7 FL (ref 80–100)
MONOCYTES ABSOLUTE: 0.6 K/UL (ref 0.2–0.8)
MONOCYTES RELATIVE PERCENT: 5.4 %
NEUTROPHILS ABSOLUTE: 5.4 K/UL (ref 2–7.5)
NEUTROPHILS RELATIVE PERCENT: 51.4 %
PDW BLD-RTO: 13.2 % (ref 11–16)
PLATELET # BLD: 245 K/UL (ref 150–400)
PMV BLD AUTO: 10.3 FL (ref 6–10)
POTASSIUM SERPL-SCNC: 4 MMOL/L (ref 3.4–5.1)
PRO-BNP: 82 PG/ML (ref 0–1800)
RBC # BLD: 4.29 M/UL (ref 4.5–6)
SODIUM BLD-SCNC: 135 MMOL/L (ref 136–145)
TOTAL PROTEIN: 7.2 G/DL (ref 6.4–8.3)
TROPONIN: <0.3 NG/ML
WBC # BLD: 10.5 K/UL (ref 4–11)

## 2022-09-11 PROCEDURE — 36415 COLL VENOUS BLD VENIPUNCTURE: CPT

## 2022-09-11 PROCEDURE — 71045 X-RAY EXAM CHEST 1 VIEW: CPT

## 2022-09-11 PROCEDURE — 83880 ASSAY OF NATRIURETIC PEPTIDE: CPT

## 2022-09-11 PROCEDURE — 85025 COMPLETE CBC W/AUTO DIFF WBC: CPT

## 2022-09-11 PROCEDURE — 80053 COMPREHEN METABOLIC PANEL: CPT

## 2022-09-11 PROCEDURE — 84484 ASSAY OF TROPONIN QUANT: CPT

## 2022-09-11 PROCEDURE — 93005 ELECTROCARDIOGRAM TRACING: CPT

## 2022-09-11 PROCEDURE — 70450 CT HEAD/BRAIN W/O DYE: CPT

## 2022-09-11 PROCEDURE — 6370000000 HC RX 637 (ALT 250 FOR IP): Performed by: EMERGENCY MEDICINE

## 2022-09-11 PROCEDURE — 99285 EMERGENCY DEPT VISIT HI MDM: CPT

## 2022-09-11 PROCEDURE — 83690 ASSAY OF LIPASE: CPT

## 2022-09-11 RX ORDER — MECLIZINE HCL 12.5 MG/1
12.5 TABLET ORAL 3 TIMES DAILY PRN
Qty: 15 TABLET | Refills: 0 | Status: SHIPPED | OUTPATIENT
Start: 2022-09-11 | End: 2022-09-21

## 2022-09-11 RX ORDER — MECLIZINE HCL 12.5 MG/1
25 TABLET ORAL ONCE
Status: COMPLETED | OUTPATIENT
Start: 2022-09-11 | End: 2022-09-11

## 2022-09-11 RX ORDER — TRAMADOL HYDROCHLORIDE 50 MG/1
50 TABLET ORAL EVERY 6 HOURS PRN
COMMUNITY

## 2022-09-11 RX ORDER — GABAPENTIN 600 MG/1
600 TABLET ORAL 3 TIMES DAILY
COMMUNITY

## 2022-09-11 RX ADMIN — MECLIZINE 25 MG: 12.5 TABLET ORAL at 21:45

## 2022-09-11 ASSESSMENT — PAIN - FUNCTIONAL ASSESSMENT: PAIN_FUNCTIONAL_ASSESSMENT: NONE - DENIES PAIN

## 2022-09-12 NOTE — ED PROVIDER NOTES
[address] [facility] [address] ENCOUNTER      Pt Name: Ayala Whitney  MRN: [id number]  YOB: 1955  Date of evaluation: 9/11/2022  Provider: Cordie Bamberger, MD    CHIEF COMPLAINT       Chief Complaint   Patient presents with    Dizziness         HISTORY OF PRESENT ILLNESS  (Location/Symptom, Timing/Onset, Context/Setting, Quality, Duration, Modifying Factors, Severity.)   Ayala Whitney is a 69 y.o. male who presents to the emergency department with concerns of intermittent vertigo recently. Patient states that he couple episodes today where he felt that the room was spinning with changes in position. Patient denies any changes in vision or speech or any focal signs or symptoms. Patient denies any nausea vomiting chest pain shortness of breath or any focal signs or symptoms. Patient states that he has had vertigo in the past and states that this feels similar in nature. Patient is resting comfortably in the room in no acute distress conversing in full sentences nontoxic. Nursing notes were reviewed. REVIEW OFSYSTEMS    (2-9 systems for level 4, 10 or more for level 5)   ROS:  General:  No fevers, no chills, no weakness  Cardiovascular:  No chest pain, no palpitations  Respiratory:  No shortness of breath, no cough, no wheezing  Gastrointestinal:  No pain, no nausea, no vomiting, no diarrhea  Musculoskeletal:  No muscle pain, no joint pain  Skin:  No rash, no easy bruising  Neurologic:  No speech problems, no headache, no extremity weakness. Intermittent dizziness  Psychiatric:  No anxiety  Genitourinary:  No dysuria, no hematuria    Except as noted above the remainder of the review of systems was reviewed and negative.        PAST MEDICAL HISTORY     Past Medical History:   Diagnosis Date    Chronic back pain     feet,hands,legs,knees    Chronic kidney disease     Stage III    DDD (degenerative disc disease), lumbar     GERD (gastroesophageal reflux disease) PRAVASTATIN (PRAVACHOL) 80 MG TABLET    TAKE 1 TABLET BY MOUTH EVERY EVENING for cholesterol    RESPIRATORY THERAPY SUPPLIES PREMA    by Does not apply route. TERAZOSIN (HYTRIN) 5 MG CAPSULE    Take 1 capsule by mouth nightly    TRAMADOL (ULTRAM) 50 MG TABLET    Take 50 mg by mouth every 6 hours as needed for Pain. TRAZODONE (DESYREL) 100 MG TABLET    TAKE ONE TABLET BY MOUTH NIGHTLY for sleep       ALLERGIES     Atenolol    FAMILY HISTORY       Family History   Problem Relation Age of Onset    Diabetes Mother     Cancer Father         lung          SOCIAL HISTORY       Social History     Socioeconomic History    Marital status:      Spouse name: None    Number of children: None    Years of education: None    Highest education level: None   Tobacco Use    Smoking status: Never    Smokeless tobacco: Never   Substance and Sexual Activity    Alcohol use: No         PHYSICAL EXAM    (up to 7 for level 4, 8 or more for level 5)     ED Triage Vitals [09/11/22 2050]   BP Temp Temp Source Heart Rate Resp SpO2 Height Weight   (!) 166/72 98.2 °F (36.8 °C) Oral 59 18 95 % 6' (1.829 m) (!) 380 lb (172.4 kg)       Physical Exam  General :Patient is awake, alert, oriented, in no acute distress, nontoxic appearing  HEENT: Pupils are equally round and reactive to light, EOMI, conjunctivae clear. Positive Hallpike's maneuver with horizontal nystagmus present. Oral mucosa is moist, no exudate. Uvula is midline  Neck: Neck is supple, full range of motion, trachea midline  Cardiac: Heart regular rate, rhythm, no murmurs, rubs, or gallops  Lungs: Lungs are clear to auscultation, there is no wheezing, rhonchi, or rales. There is no use of accessory muscles. Chest wall: There is no tenderness to palpation over the chest wall or over ribs  Abdomen: Abdomen is soft, nontender, nondistended. There is no firm or pulsatile masses, no rebound rigidity or guarding. Musculoskeletal: 5 out of 5 strength in all 4 extremities.   No focal muscle deficits are appreciated  Neuro: Motor intact, sensory intact, level of consciousness is normal, cerebellar function is normal, reflexes are grossly normal. No evidence of incontinence or loss of bowel or bladder function, no saddle anesthesia noted   Dermatology: Skin is warm and dry  Psych: Mentation is grossly normal, cognition is grossly normal. Affect is appropriate. DIAGNOSTIC RESULTS     EKG: All EKG's are interpreted by the Emergency Department Physician who either signs or Co-signs this chart in the [address] a cardiologist.    The EKG interpreted by me shows normal sinus rhythm rate of 60 per EDMD.    RADIOLOGY:   Non-plain film images such as CT, Ultrasound and MRI are read by the radiologist. Plain radiographic images are visualized and preliminarily interpreted by the emergency physician with the below findings:      [] Radiologist's Report Reviewed:  XR CHEST PORTABLE   Final Result      No acute pulmonary disease. CT Head W/O Contrast   Final Result      No acute intracranial abnormality.             ED BEDSIDE ULTRASOUND:   Performed by ED Physician - none    LABS:    I have reviewed and interpreted all of the currently available lab results from this visit (ifapplicable):  Results for orders placed or performed during the hospital encounter of 09/11/22   CBC with Auto Differential   Result Value Ref Range    WBC 10.5 4.0 - 11.0 K/uL    RBC 4.29 (L) 4.50 - 6.00 M/uL    Hemoglobin 12.6 (L) 13.0 - 18.0 g/dL    Hematocrit 38.5 (L) 40.0 - 54.0 %    MCV 89.7 80.0 - 100.0 fL    MCH 29.4 27.0 - 32.0 pg    MCHC 32.7 31.0 - 35.0 g/dL    RDW 13.2 11.0 - 16.0 %    Platelets 290 393 - 100 K/uL    MPV 10.3 (H) 6.0 - 10.0 fL    Neutrophils % 51.4 %    Immature Granulocytes % 0.3 0.0 - 5.0 %    Lymphocytes % 23.4 %    Monocytes % 5.4 %    Eosinophils % 18.3 %    Basophils % 1.2 %    Neutrophils Absolute 5.4 2.0 - 7.5 K/uL    Immature Granulocytes # 0.0 K/uL    Lymphocytes Absolute 2.5 1.5 - 4.0 K/uL    Monocytes Absolute 0.6 0.2 - 0.8 K/uL    Eosinophils Absolute 1.9 (H) 0.0 - 0.4 K/uL    Basophils Absolute 0.1 0.0 - 0.1 K/uL   CMP   Result Value Ref Range    Sodium 135 (L) 136 - 145 mmol/L    Potassium 4.0 3.4 - 5.1 mmol/L    Chloride 98 98 - 107 mmol/L    CO2 28 20 - 30 mmol/L    Anion Gap 9 3 - 16    Glucose 144 (H) 74 - 106 mg/dL    BUN 21 (H) 6 - 20 mg/dL    Creatinine 1.5 (H) 0.4 - 1.2 mg/dL    GFR Non- 46 (L) >59    GFR  56 (L) >59    Calcium 8.9 8.5 - 10.5 mg/dL    Total Protein 7.2 6.4 - 8.3 g/dL    Albumin 4.3 3.4 - 4.8 g/dL    Albumin/Globulin Ratio 1.5 0.8 - 2.0    Total Bilirubin 0.3 0.3 - 1.2 mg/dL    Alkaline Phosphatase 123 (H) 25 - 100 U/L    ALT <5 4 - 36 U/L    AST 12 8 - 33 U/L    Globulin 2.9 Not Established g/dL   Lipase   Result Value Ref Range    Lipase 33.0 5.6 - 51.3 U/L   Troponin   Result Value Ref Range    Troponin <0.30 <0.30 ng/mL   Brain Natriuretic Peptide   Result Value Ref Range    Pro-BNP 82 0 - 1,800 pg/mL        All other labs were within normal range or not returned as of this dictation. EMERGENCY DEPARTMENT COURSE and DIFFERENTIAL DIAGNOSIS/MDM:   Vitals:    Vitals:    09/11/22 2050   BP: (!) 166/72   Pulse: 59   Resp: 18   Temp: 98.2 °F (36.8 °C)   TempSrc: Oral   SpO2: 95%   Weight: (!) 380 lb (172.4 kg)   Height: 6' (1.829 m)       MEDICATIONS ADMINISTERED IN ED:  Medications   meclizine (ANTIVERT) tablet 25 mg (25 mg Oral Given 9/11/22 2145)       Patient was placed in examination room at which time after exam was performed IV was obtained and labs are drawn. EKG reveals normal sinus rhythm rate of 60 per EDMD.  Review of patient's labs consistent with glucose 144. Creatinine is consistent with patient's mild chronic renal failure where his last creatinine was 1.59 on 4/22/2021 and today is 1.5. White count is 10.5 thousand hemoglobin is 12.6.   CT scan head was no acute process per radiology along with a negative chest x-ray per radiology. Patient was given meclizine 25 mg p.o. and patient states that he had total resolution of his symptoms. Patient states that he feels much better is ready to go home. Patient was instructed take medications as prescribed and follow-up primary physician 1 to 2 days. Patient is appreciative care and agrees with plan above      The patient will follow-up with their PCP in 1-2 days for reevaluation. If the patient or family members have anyfurther concerns or any worsening symptoms they will return to the ED for reevaluation. Is this patient to be included in the SEP-1 Core Measure due to severe sepsis or septic shock? No   Exclusion criteria - the patient is NOT to be included for SEP-1 Core Measure due to:  2+ SIRS criteria are not met          CONSULTS:  None    PROCEDURES:  Procedures    CRITICAL CARE TIME    Total Critical Care time was 0 minutes, excluding separately reportable procedures. There was a high probability of clinically significant/life threatening deterioration in the patient's condition which required my urgent intervention. FINAL IMPRESSION      1. Dizziness Stable   2. Vertigo Stable         DISPOSITION/PLAN   DISPOSITION Decision To Discharge 09/11/2022 11:13:42 PM      PATIENT REFERRED TO:  Conor Mercado, [address]   [phone]    Schedule an appointment as soon as possible for a visit in 2 days      [facility] Emergency Department  [facility] [address] [facility]  [phone]  In 2 days  If symptoms worsen    DISCHARGE MEDICATIONS:  New Prescriptions    MECLIZINE (ANTIVERT) 12.5 MG TABLET    Take 1 tablet by mouth 3 times daily as needed for Dizziness       Comment: Please note this report has been produced using speech recognition software and may contain errorsrelated to that system including errors in grammar, punctuation, and spelling, as well as words and phrases that may be inappropriate.  If there are any questions or concerns please feel free to contact the dictating providerfor clarification.     Zeke Elkins MD  Attending Emergency Physician               Zeke Elkins MD  09/11/22 3214

## 2022-09-12 NOTE — ED NOTES
Discharge instructions provided to patient. Patient verbalizes understanding of d/c plan and follow up care. PIV removed. Patient ambulatory off unit to POV at this time with no further needs noted.       Soham Tong RN  09/11/22 0769

## 2023-10-20 ENCOUNTER — HOSPITAL ENCOUNTER (INPATIENT)
Facility: HOSPITAL | Age: 68
LOS: 3 days | Discharge: HOME | DRG: 871 | End: 2023-10-23
Attending: HOSPITALIST | Admitting: INTERNAL MEDICINE
Payer: MEDICARE

## 2023-10-20 ENCOUNTER — APPOINTMENT (OUTPATIENT)
Dept: CT IMAGING | Facility: HOSPITAL | Age: 68
DRG: 871 | End: 2023-10-20
Attending: HOSPITALIST
Payer: MEDICARE

## 2023-10-20 ENCOUNTER — APPOINTMENT (OUTPATIENT)
Dept: GENERAL RADIOLOGY | Facility: HOSPITAL | Age: 68
DRG: 871 | End: 2023-10-20
Attending: HOSPITALIST
Payer: MEDICARE

## 2023-10-20 DIAGNOSIS — A41.9 SEPSIS WITH ACUTE HYPOXIC RESPIRATORY FAILURE WITHOUT SEPTIC SHOCK, DUE TO UNSPECIFIED ORGANISM (HCC): ICD-10-CM

## 2023-10-20 DIAGNOSIS — J18.9 PNEUMONIA OF LEFT LOWER LOBE DUE TO INFECTIOUS ORGANISM: ICD-10-CM

## 2023-10-20 DIAGNOSIS — N18.30 ACUTE RENAL FAILURE SUPERIMPOSED ON STAGE 3 CHRONIC KIDNEY DISEASE, UNSPECIFIED ACUTE RENAL FAILURE TYPE, UNSPECIFIED WHETHER STAGE 3A OR 3B CKD (HCC): ICD-10-CM

## 2023-10-20 DIAGNOSIS — G47.30 SLEEP APNEA, UNSPECIFIED TYPE: Chronic | ICD-10-CM

## 2023-10-20 DIAGNOSIS — D64.9 ANEMIA, UNSPECIFIED TYPE: ICD-10-CM

## 2023-10-20 DIAGNOSIS — J96.01 SEPSIS WITH ACUTE HYPOXIC RESPIRATORY FAILURE WITHOUT SEPTIC SHOCK, DUE TO UNSPECIFIED ORGANISM (HCC): ICD-10-CM

## 2023-10-20 DIAGNOSIS — R65.20 SEPSIS WITH ACUTE HYPOXIC RESPIRATORY FAILURE WITHOUT SEPTIC SHOCK, DUE TO UNSPECIFIED ORGANISM (HCC): ICD-10-CM

## 2023-10-20 DIAGNOSIS — N17.9 ACUTE RENAL FAILURE SUPERIMPOSED ON STAGE 3 CHRONIC KIDNEY DISEASE, UNSPECIFIED ACUTE RENAL FAILURE TYPE, UNSPECIFIED WHETHER STAGE 3A OR 3B CKD (HCC): ICD-10-CM

## 2023-10-20 DIAGNOSIS — N30.00 ACUTE CYSTITIS WITHOUT HEMATURIA: ICD-10-CM

## 2023-10-20 DIAGNOSIS — J96.01 ACUTE RESPIRATORY FAILURE WITH HYPOXIA (HCC): Primary | ICD-10-CM

## 2023-10-20 DIAGNOSIS — R41.0 CONFUSION: ICD-10-CM

## 2023-10-20 LAB
ALBUMIN SERPL-MCNC: 4.1 G/DL (ref 3.4–4.8)
ALBUMIN/GLOB SERPL: 1.3 {RATIO} (ref 0.8–2)
ALP SERPL-CCNC: 101 U/L (ref 25–100)
ALT SERPL-CCNC: 13 U/L (ref 4–36)
AMPHET UR QL SCN: ABNORMAL
ANION GAP SERPL CALCULATED.3IONS-SCNC: 10 MMOL/L (ref 3–16)
AST SERPL-CCNC: 9 U/L (ref 8–33)
BACTERIA URNS QL MICRO: ABNORMAL /HPF
BARBITURATES UR QL SCN: ABNORMAL
BASE EXCESS BLDA CALC-SCNC: -2.7 MMOL/L (ref -3–3)
BASOPHILS # BLD: 0.1 K/UL (ref 0–0.1)
BASOPHILS NFR BLD: 0.6 %
BENZODIAZ UR QL SCN: ABNORMAL
BILIRUB SERPL-MCNC: 0.8 MG/DL (ref 0.3–1.2)
BILIRUB UR QL STRIP.AUTO: ABNORMAL
BUN SERPL-MCNC: 49 MG/DL (ref 6–20)
BUPRENORPHINE QUAL, URINE: ABNORMAL
CALCIUM SERPL-MCNC: 9.1 MG/DL (ref 8.5–10.5)
CANNABINOIDS UR QL SCN: ABNORMAL
CHLORIDE SERPL-SCNC: 99 MMOL/L (ref 98–107)
CLARITY UR: CLEAR
CO2 BLDA-SCNC: 24 MMOL/L (ref 24–30)
CO2 SERPL-SCNC: 25 MMOL/L (ref 20–30)
COCAINE UR QL SCN: ABNORMAL
COLOR UR: YELLOW
CREAT SERPL-MCNC: 3.6 MG/DL (ref 0.4–1.2)
DRUG SCREEN COMMENT UR-IMP: ABNORMAL
EOSINOPHIL # BLD: 2.2 K/UL (ref 0–0.4)
EOSINOPHIL NFR BLD: 10.6 %
ERYTHROCYTE [DISTWIDTH] IN BLOOD BY AUTOMATED COUNT: 12.6 % (ref 11–16)
FLUAV AG NPH QL: NEGATIVE
FLUBV AG NPH QL: NEGATIVE
GFR SERPLBLD CREATININE-BSD FMLA CKD-EPI: 17 ML/MIN/{1.73_M2}
GLOBULIN SER CALC-MCNC: 3.1 G/DL
GLUCOSE BLD-MCNC: 188 MG/DL (ref 74–106)
GLUCOSE BLD-MCNC: 287 MG/DL (ref 74–106)
GLUCOSE SERPL-MCNC: 237 MG/DL (ref 74–106)
GLUCOSE UR STRIP.AUTO-MCNC: 100 MG/DL
HCO3 BLDA-SCNC: 22.7 MMOL/L (ref 22–26)
HCT VFR BLD AUTO: 36.2 % (ref 40–54)
HGB BLD-MCNC: 12.3 G/DL (ref 13–18)
HGB UR QL STRIP.AUTO: NEGATIVE
IMM GRANULOCYTES # BLD: 0.1 K/UL
IMM GRANULOCYTES NFR BLD: 0.6 % (ref 0–5)
INHALED O2 FLOW RATE: 0.28 %
KETONES UR STRIP.AUTO-MCNC: ABNORMAL MG/DL
LACTATE BLDV-SCNC: 1.8 MMOL/L (ref 0.4–1.9)
LEUKOCYTE ESTERASE UR QL STRIP.AUTO: NEGATIVE
LYMPHOCYTES # BLD: 2.4 K/UL (ref 1.5–4)
LYMPHOCYTES NFR BLD: 11.8 %
MCH RBC QN AUTO: 31 PG (ref 27–32)
MCHC RBC AUTO-ENTMCNC: 34 G/DL (ref 31–35)
MCV RBC AUTO: 91.2 FL (ref 80–100)
METHADONE UR QL SCN: ABNORMAL
METHAMPHET UR QL SCN: ABNORMAL
MONOCYTES # BLD: 1.3 K/UL (ref 0.2–0.8)
MONOCYTES NFR BLD: 6.3 %
MUCOUS THREADS URNS QL MICRO: ABNORMAL /LPF
NEUTROPHILS # BLD: 14.3 K/UL (ref 2–7.5)
NEUTS SEG NFR BLD: 70.1 %
NITRITE UR QL STRIP.AUTO: NEGATIVE
NT-PROBNP SERPL-MCNC: 362 PG/ML (ref 0–1800)
O2 THERAPY: ABNORMAL
OPIATES UR QL SCN: ABNORMAL
OXYCODONE UR QL SCN: POSITIVE
PCO2 BLDA: 41.8 MMHG (ref 35–45)
PCP UR QL SCN: ABNORMAL
PERFORMED ON: ABNORMAL
PERFORMED ON: ABNORMAL
PH BLDA: 7.35 [PH] (ref 7.35–7.45)
PH UR STRIP.AUTO: 5 [PH] (ref 5–8)
PLATELET # BLD AUTO: 232 K/UL (ref 150–400)
PMV BLD AUTO: 10.3 FL (ref 6–10)
PO2 BLDA: 60.9 MMHG (ref 80–100)
POTASSIUM SERPL-SCNC: 4.5 MMOL/L (ref 3.4–5.1)
PROPOXYPH UR QL SCN: ABNORMAL
PROT SERPL-MCNC: 7.2 G/DL (ref 6.4–8.3)
PROT UR STRIP.AUTO-MCNC: 30 MG/DL
RBC # BLD AUTO: 3.97 M/UL (ref 4.5–6)
RBC #/AREA URNS HPF: ABNORMAL /HPF (ref 0–4)
SAO2 % BLDA: 90.2 %
SARS-COV-2 RDRP RESP QL NAA+PROBE: NOT DETECTED
SODIUM SERPL-SCNC: 134 MMOL/L (ref 136–145)
SP GR UR STRIP.AUTO: 1.02 (ref 1–1.03)
TRICYCLICS UR QL SCN: ABNORMAL
UA COMPLETE W REFLEX CULTURE PNL UR: YES
UA DIPSTICK W REFLEX MICRO PNL UR: YES
URN SPEC COLLECT METH UR: ABNORMAL
UROBILINOGEN UR STRIP-ACNC: 0.2 E.U./DL
WBC # BLD AUTO: 20.4 K/UL (ref 4–11)
WBC #/AREA URNS HPF: ABNORMAL /HPF (ref 0–5)

## 2023-10-20 PROCEDURE — 99285 EMERGENCY DEPT VISIT HI MDM: CPT

## 2023-10-20 PROCEDURE — 87635 SARS-COV-2 COVID-19 AMP PRB: CPT

## 2023-10-20 PROCEDURE — 2580000003 HC RX 258: Performed by: PHYSICIAN ASSISTANT

## 2023-10-20 PROCEDURE — 36415 COLL VENOUS BLD VENIPUNCTURE: CPT

## 2023-10-20 PROCEDURE — 96375 TX/PRO/DX INJ NEW DRUG ADDON: CPT

## 2023-10-20 PROCEDURE — 87804 INFLUENZA ASSAY W/OPTIC: CPT

## 2023-10-20 PROCEDURE — 94669 MECHANICAL CHEST WALL OSCILL: CPT

## 2023-10-20 PROCEDURE — 82803 BLOOD GASES ANY COMBINATION: CPT

## 2023-10-20 PROCEDURE — 96367 TX/PROPH/DG ADDL SEQ IV INF: CPT

## 2023-10-20 PROCEDURE — 87040 BLOOD CULTURE FOR BACTERIA: CPT

## 2023-10-20 PROCEDURE — 93005 ELECTROCARDIOGRAM TRACING: CPT

## 2023-10-20 PROCEDURE — 1200000000 HC SEMI PRIVATE

## 2023-10-20 PROCEDURE — 6370000000 HC RX 637 (ALT 250 FOR IP): Performed by: PHYSICIAN ASSISTANT

## 2023-10-20 PROCEDURE — 84145 PROCALCITONIN (PCT): CPT

## 2023-10-20 PROCEDURE — 87086 URINE CULTURE/COLONY COUNT: CPT

## 2023-10-20 PROCEDURE — 81001 URINALYSIS AUTO W/SCOPE: CPT

## 2023-10-20 PROCEDURE — 6370000000 HC RX 637 (ALT 250 FOR IP): Performed by: INTERNAL MEDICINE

## 2023-10-20 PROCEDURE — 85025 COMPLETE CBC W/AUTO DIFF WBC: CPT

## 2023-10-20 PROCEDURE — 6360000002 HC RX W HCPCS: Performed by: PHYSICIAN ASSISTANT

## 2023-10-20 PROCEDURE — 94640 AIRWAY INHALATION TREATMENT: CPT

## 2023-10-20 PROCEDURE — 71045 X-RAY EXAM CHEST 1 VIEW: CPT

## 2023-10-20 PROCEDURE — 2580000003 HC RX 258: Performed by: HOSPITALIST

## 2023-10-20 PROCEDURE — 74176 CT ABD & PELVIS W/O CONTRAST: CPT

## 2023-10-20 PROCEDURE — 36600 WITHDRAWAL OF ARTERIAL BLOOD: CPT

## 2023-10-20 PROCEDURE — C9113 INJ PANTOPRAZOLE SODIUM, VIA: HCPCS | Performed by: HOSPITALIST

## 2023-10-20 PROCEDURE — 83880 ASSAY OF NATRIURETIC PEPTIDE: CPT

## 2023-10-20 PROCEDURE — 80053 COMPREHEN METABOLIC PANEL: CPT

## 2023-10-20 PROCEDURE — 80307 DRUG TEST PRSMV CHEM ANLYZR: CPT

## 2023-10-20 PROCEDURE — 96365 THER/PROPH/DIAG IV INF INIT: CPT

## 2023-10-20 PROCEDURE — 6360000002 HC RX W HCPCS: Performed by: HOSPITALIST

## 2023-10-20 PROCEDURE — 83605 ASSAY OF LACTIC ACID: CPT

## 2023-10-20 RX ORDER — DEXTROSE MONOHYDRATE 100 MG/ML
INJECTION, SOLUTION INTRAVENOUS CONTINUOUS PRN
Status: DISCONTINUED | OUTPATIENT
Start: 2023-10-20 | End: 2023-10-23 | Stop reason: HOSPADM

## 2023-10-20 RX ORDER — PRAVASTATIN SODIUM 20 MG
80 TABLET ORAL NIGHTLY
Status: DISCONTINUED | OUTPATIENT
Start: 2023-10-20 | End: 2023-10-23 | Stop reason: HOSPADM

## 2023-10-20 RX ORDER — ONDANSETRON 4 MG/1
4 TABLET, ORALLY DISINTEGRATING ORAL EVERY 8 HOURS PRN
Status: DISCONTINUED | OUTPATIENT
Start: 2023-10-20 | End: 2023-10-23 | Stop reason: HOSPADM

## 2023-10-20 RX ORDER — IPRATROPIUM BROMIDE AND ALBUTEROL SULFATE 2.5; .5 MG/3ML; MG/3ML
1 SOLUTION RESPIRATORY (INHALATION)
Status: DISCONTINUED | OUTPATIENT
Start: 2023-10-20 | End: 2023-10-23 | Stop reason: HOSPADM

## 2023-10-20 RX ORDER — PANTOPRAZOLE SODIUM 40 MG/1
40 TABLET, DELAYED RELEASE ORAL
Status: DISCONTINUED | OUTPATIENT
Start: 2023-10-21 | End: 2023-10-23 | Stop reason: HOSPADM

## 2023-10-20 RX ORDER — MECLIZINE HCL 25MG 25 MG/1
25 TABLET, CHEWABLE ORAL 3 TIMES DAILY PRN
COMMUNITY

## 2023-10-20 RX ORDER — AZITHROMYCIN 250 MG/1
250 TABLET, FILM COATED ORAL DAILY
Status: DISCONTINUED | OUTPATIENT
Start: 2023-10-21 | End: 2023-10-23 | Stop reason: HOSPADM

## 2023-10-20 RX ORDER — INSULIN GLARGINE 100 [IU]/ML
40 INJECTION, SOLUTION SUBCUTANEOUS NIGHTLY
Status: DISCONTINUED | OUTPATIENT
Start: 2023-10-20 | End: 2023-10-23 | Stop reason: HOSPADM

## 2023-10-20 RX ORDER — ALLOPURINOL 100 MG/1
100 TABLET ORAL DAILY
Status: DISCONTINUED | OUTPATIENT
Start: 2023-10-21 | End: 2023-10-23 | Stop reason: HOSPADM

## 2023-10-20 RX ORDER — DOXAZOSIN MESYLATE 4 MG/1
4 TABLET ORAL NIGHTLY
Status: DISCONTINUED | OUTPATIENT
Start: 2023-10-20 | End: 2023-10-21

## 2023-10-20 RX ORDER — MECLIZINE HYDROCHLORIDE 25 MG/1
25 TABLET ORAL 2 TIMES DAILY PRN
Status: DISCONTINUED | OUTPATIENT
Start: 2023-10-20 | End: 2023-10-23 | Stop reason: HOSPADM

## 2023-10-20 RX ORDER — SODIUM CHLORIDE 9 MG/ML
INJECTION, SOLUTION INTRAVENOUS CONTINUOUS
Status: DISCONTINUED | OUTPATIENT
Start: 2023-10-20 | End: 2023-10-20

## 2023-10-20 RX ORDER — ONDANSETRON 2 MG/ML
4 INJECTION INTRAMUSCULAR; INTRAVENOUS EVERY 6 HOURS PRN
Status: DISCONTINUED | OUTPATIENT
Start: 2023-10-20 | End: 2023-10-23 | Stop reason: HOSPADM

## 2023-10-20 RX ORDER — INSULIN LISPRO 100 [IU]/ML
0-4 INJECTION, SOLUTION INTRAVENOUS; SUBCUTANEOUS NIGHTLY
Status: DISCONTINUED | OUTPATIENT
Start: 2023-10-20 | End: 2023-10-23 | Stop reason: HOSPADM

## 2023-10-20 RX ORDER — CARVEDILOL 25 MG/1
25 TABLET ORAL 2 TIMES DAILY
Status: DISCONTINUED | OUTPATIENT
Start: 2023-10-20 | End: 2023-10-21

## 2023-10-20 RX ORDER — ENOXAPARIN SODIUM 100 MG/ML
30 INJECTION SUBCUTANEOUS DAILY
Status: DISCONTINUED | OUTPATIENT
Start: 2023-10-20 | End: 2023-10-23

## 2023-10-20 RX ORDER — GUAIFENESIN 600 MG/1
600 TABLET, EXTENDED RELEASE ORAL 2 TIMES DAILY
Status: DISCONTINUED | OUTPATIENT
Start: 2023-10-20 | End: 2023-10-23 | Stop reason: HOSPADM

## 2023-10-20 RX ORDER — OXYCODONE HYDROCHLORIDE 5 MG/1
5 TABLET ORAL
Status: DISCONTINUED | OUTPATIENT
Start: 2023-10-20 | End: 2023-10-20 | Stop reason: ALTCHOICE

## 2023-10-20 RX ORDER — 0.9 % SODIUM CHLORIDE 0.9 %
500 INTRAVENOUS SOLUTION INTRAVENOUS ONCE
Status: COMPLETED | OUTPATIENT
Start: 2023-10-20 | End: 2023-10-20

## 2023-10-20 RX ORDER — ACETAMINOPHEN 650 MG/1
650 SUPPOSITORY RECTAL EVERY 6 HOURS PRN
Status: DISCONTINUED | OUTPATIENT
Start: 2023-10-20 | End: 2023-10-23 | Stop reason: HOSPADM

## 2023-10-20 RX ORDER — OXYCODONE AND ACETAMINOPHEN 10; 325 MG/1; MG/1
0.5 TABLET ORAL
COMMUNITY

## 2023-10-20 RX ORDER — TRAZODONE HYDROCHLORIDE 50 MG/1
100 TABLET ORAL NIGHTLY
Status: DISCONTINUED | OUTPATIENT
Start: 2023-10-20 | End: 2023-10-23 | Stop reason: HOSPADM

## 2023-10-20 RX ORDER — OXYCODONE AND ACETAMINOPHEN 10; 325 MG/1; MG/1
1 TABLET ORAL EVERY MORNING
Status: DISCONTINUED | OUTPATIENT
Start: 2023-10-21 | End: 2023-10-23 | Stop reason: HOSPADM

## 2023-10-20 RX ORDER — GUAIFENESIN/DEXTROMETHORPHAN 100-10MG/5
5 SYRUP ORAL EVERY 4 HOURS PRN
Status: DISCONTINUED | OUTPATIENT
Start: 2023-10-20 | End: 2023-10-23 | Stop reason: HOSPADM

## 2023-10-20 RX ORDER — GABAPENTIN 300 MG/1
300 CAPSULE ORAL 2 TIMES DAILY
Status: DISCONTINUED | OUTPATIENT
Start: 2023-10-20 | End: 2023-10-23

## 2023-10-20 RX ORDER — GABAPENTIN 800 MG/1
800 TABLET ORAL 3 TIMES DAILY
COMMUNITY

## 2023-10-20 RX ORDER — INSULIN LISPRO 100 [IU]/ML
0-4 INJECTION, SOLUTION INTRAVENOUS; SUBCUTANEOUS
Status: DISCONTINUED | OUTPATIENT
Start: 2023-10-20 | End: 2023-10-23 | Stop reason: HOSPADM

## 2023-10-20 RX ORDER — OXYCODONE HYDROCHLORIDE 10 MG/1
5 TABLET ORAL
Status: ON HOLD | COMMUNITY
End: 2023-10-20

## 2023-10-20 RX ORDER — POLYETHYLENE GLYCOL 3350 17 G/17G
17 POWDER, FOR SOLUTION ORAL DAILY PRN
Status: DISCONTINUED | OUTPATIENT
Start: 2023-10-20 | End: 2023-10-23 | Stop reason: HOSPADM

## 2023-10-20 RX ORDER — METOLAZONE 5 MG/1
2.5 TABLET ORAL EVERY OTHER DAY
Status: DISCONTINUED | OUTPATIENT
Start: 2023-10-22 | End: 2023-10-23 | Stop reason: HOSPADM

## 2023-10-20 RX ORDER — OXYCODONE AND ACETAMINOPHEN 10; 325 MG/1; MG/1
1 TABLET ORAL EVERY MORNING
COMMUNITY

## 2023-10-20 RX ORDER — OXYCODONE HYDROCHLORIDE 10 MG/1
10 TABLET ORAL EVERY MORNING
Status: ON HOLD | COMMUNITY
End: 2023-10-20

## 2023-10-20 RX ORDER — ACETAMINOPHEN 325 MG/1
650 TABLET ORAL EVERY 6 HOURS PRN
Status: DISCONTINUED | OUTPATIENT
Start: 2023-10-20 | End: 2023-10-23 | Stop reason: HOSPADM

## 2023-10-20 RX ORDER — LEVOTHYROXINE SODIUM 88 UG/1
88 TABLET ORAL DAILY
Status: DISCONTINUED | OUTPATIENT
Start: 2023-10-21 | End: 2023-10-23 | Stop reason: HOSPADM

## 2023-10-20 RX ORDER — BUDESONIDE 0.5 MG/2ML
0.5 INHALANT ORAL
Status: DISCONTINUED | OUTPATIENT
Start: 2023-10-20 | End: 2023-10-23 | Stop reason: HOSPADM

## 2023-10-20 RX ORDER — OXYCODONE AND ACETAMINOPHEN 10; 325 MG/1; MG/1
0.5 TABLET ORAL NIGHTLY
Status: DISCONTINUED | OUTPATIENT
Start: 2023-10-20 | End: 2023-10-23 | Stop reason: HOSPADM

## 2023-10-20 RX ORDER — IBUPROFEN 600 MG/1
1 TABLET ORAL PRN
Status: DISCONTINUED | OUTPATIENT
Start: 2023-10-20 | End: 2023-10-23 | Stop reason: HOSPADM

## 2023-10-20 RX ORDER — SODIUM CHLORIDE 9 MG/ML
INJECTION, SOLUTION INTRAVENOUS CONTINUOUS
Status: DISCONTINUED | OUTPATIENT
Start: 2023-10-20 | End: 2023-10-23

## 2023-10-20 RX ADMIN — CARVEDILOL 25 MG: 25 TABLET, FILM COATED ORAL at 20:20

## 2023-10-20 RX ADMIN — CEFTRIAXONE 1000 MG: 1 INJECTION, POWDER, FOR SOLUTION INTRAMUSCULAR; INTRAVENOUS at 13:50

## 2023-10-20 RX ADMIN — GABAPENTIN 300 MG: 300 CAPSULE ORAL at 20:19

## 2023-10-20 RX ADMIN — ENOXAPARIN SODIUM 30 MG: 100 INJECTION SUBCUTANEOUS at 17:13

## 2023-10-20 RX ADMIN — GUAIFENESIN 600 MG: 600 TABLET, EXTENDED RELEASE ORAL at 20:19

## 2023-10-20 RX ADMIN — BUDESONIDE 500 MCG: 0.5 INHALANT RESPIRATORY (INHALATION) at 16:42

## 2023-10-20 RX ADMIN — PRAVASTATIN SODIUM 80 MG: 20 TABLET ORAL at 20:18

## 2023-10-20 RX ADMIN — SODIUM CHLORIDE 500 ML: 9 INJECTION, SOLUTION INTRAVENOUS at 13:45

## 2023-10-20 RX ADMIN — SODIUM CHLORIDE, PRESERVATIVE FREE 40 MG: 5 INJECTION INTRAVENOUS at 13:46

## 2023-10-20 RX ADMIN — TRAZODONE HYDROCHLORIDE 100 MG: 50 TABLET ORAL at 20:19

## 2023-10-20 RX ADMIN — INSULIN GLARGINE 40 UNITS: 100 INJECTION, SOLUTION SUBCUTANEOUS at 20:30

## 2023-10-20 RX ADMIN — OXYCODONE AND ACETAMINOPHEN 0.5 TABLET: 10; 325 TABLET ORAL at 20:19

## 2023-10-20 RX ADMIN — DICLOFENAC SODIUM 2 G: 10 GEL TOPICAL at 20:17

## 2023-10-20 RX ADMIN — DOXAZOSIN 4 MG: 4 TABLET ORAL at 20:18

## 2023-10-20 RX ADMIN — AZITHROMYCIN MONOHYDRATE 500 MG: 500 INJECTION, POWDER, LYOPHILIZED, FOR SOLUTION INTRAVENOUS at 14:39

## 2023-10-20 RX ADMIN — INSULIN LISPRO 2 UNITS: 100 INJECTION, SOLUTION INTRAVENOUS; SUBCUTANEOUS at 17:17

## 2023-10-20 RX ADMIN — SODIUM CHLORIDE: 9 INJECTION, SOLUTION INTRAVENOUS at 17:12

## 2023-10-20 RX ADMIN — IPRATROPIUM BROMIDE AND ALBUTEROL SULFATE 1 DOSE: .5; 3 SOLUTION RESPIRATORY (INHALATION) at 16:42

## 2023-10-20 RX ADMIN — SODIUM CHLORIDE 500 ML: 9 INJECTION, SOLUTION INTRAVENOUS at 14:43

## 2023-10-20 ASSESSMENT — PAIN DESCRIPTION - ORIENTATION: ORIENTATION: LEFT;UPPER

## 2023-10-20 ASSESSMENT — PAIN - FUNCTIONAL ASSESSMENT: PAIN_FUNCTIONAL_ASSESSMENT: 0-10

## 2023-10-20 ASSESSMENT — PAIN SCALES - GENERAL
PAINLEVEL_OUTOF10: 8
PAINLEVEL_OUTOF10: 4

## 2023-10-20 ASSESSMENT — PATIENT HEALTH QUESTIONNAIRE - PHQ9
SUM OF ALL RESPONSES TO PHQ QUESTIONS 1-9: 0
SUM OF ALL RESPONSES TO PHQ QUESTIONS 1-9: 0
1. LITTLE INTEREST OR PLEASURE IN DOING THINGS: 0
SUM OF ALL RESPONSES TO PHQ QUESTIONS 1-9: 0
SUM OF ALL RESPONSES TO PHQ9 QUESTIONS 1 & 2: 0
2. FEELING DOWN, DEPRESSED OR HOPELESS: 0
SUM OF ALL RESPONSES TO PHQ QUESTIONS 1-9: 0

## 2023-10-20 ASSESSMENT — PAIN DESCRIPTION - PAIN TYPE: TYPE: ACUTE PAIN

## 2023-10-20 ASSESSMENT — PAIN DESCRIPTION - LOCATION: LOCATION: ABDOMEN

## 2023-10-20 ASSESSMENT — PAIN DESCRIPTION - DESCRIPTORS: DESCRIPTORS: SHARP

## 2023-10-20 ASSESSMENT — LIFESTYLE VARIABLES: HOW OFTEN DO YOU HAVE A DRINK CONTAINING ALCOHOL: NEVER

## 2023-10-20 NOTE — PLAN OF CARE
Problem: Discharge Planning  Goal: Discharge to home or other facility with appropriate resources  Outcome: Progressing  Flowsheets  Taken 10/20/2023 1631  Discharge to home or other facility with appropriate resources:   Identify barriers to discharge with patient and caregiver   Identify discharge learning needs (meds, wound care, etc)  Taken 10/20/2023 1617  Discharge to home or other facility with appropriate resources:   Identify barriers to discharge with patient and caregiver   Identify discharge learning needs (meds, wound care, etc)     Problem: Pain  Goal: Verbalizes/displays adequate comfort level or baseline comfort level  Outcome: Progressing     Problem: Safety - Adult  Goal: Free from fall injury  Outcome: Progressing

## 2023-10-20 NOTE — ED PROVIDER NOTES
[address] [facility] ENCOUNTER        Pt Name: Gali Aguirre  MRN: [id number]  [address] [facility] 1955  Date of evaluation: 10/20/2023  Provider: Irving Patel DO  PCP: TANESHA Narvaez  Note Started: 12:37 PM EDT 10/20/23    CHIEF COMPLAINT       Chief Complaint   Patient presents with    Abdominal Pain    Fatigue    Altered Mental Status       HISTORY OF PRESENT ILLNESS: 1 or more Elements     History from : Patient    Limitations to history : None    Gali Aguirre is a 77 y.o. male who presents emergency department with complaints of abdominal pain, weakness fatigue and \"talking out of his head\". However patient was able to answer all her questions sometimes he was a little confused and when you asked the question the second time he was able to answer appropriately. Patient states that he has had some abdominal discomfort and he points more to the left upper quadrant area for the last couple of days. He states it was sharp and stabbing at 1 time but it actually has improved. He rates it as a 4 out of 10 at this time describes it more as a dull achy sensation but states it does not hurt to touch the area. He denies any nausea vomiting with the symptoms states he had diarrhea about 2 weeks ago but has not had any since then. He denies any trauma or injury to the area. Patient denies any headaches at the ordinary. He denies any earache or sore throat. Denies any nasal congestion drainage or discharge. Denies any loss of taste or smell. Denies any cough out of ordinary. Denies any chest pain or shortness of breath. Positive for abdominal pain but denies any other abdominal type symptoms as dictated above again no nausea vomiting or diarrhea at this time. Denies any dysuria or change urinary frequency. Denies any body aches out of the ordinary. Denies any sick contacts that he is aware of. Patient states he is really not been eating or drinking much.

## 2023-10-20 NOTE — ED TRIAGE NOTES
Pt arrives pov with family, and is brought into wait area in a wheel chair. Pt with cc of abd pain luq pain x 2 days. He is also c/o weakness and \"talking out of his head\" per family. He rated his abd pain a 4/10 being sharp in nature. Pt o2 sat 87*9% on room air. He was placed on 2L n/c.  Pt denies trauma or injury to his abd. He denies n/v/d. Pt does answer triage questions appropriately, sometimes a bit slow to respond but does answer. Pt sits with his eyes closed, when asked why he is keeping his eyes closed he states that he does not know shy. Pt does not know his home med list.  He states that his wife will. Pt does have a bg monitor on his abd and states that his sugar has been good. Pt sees arabella michel for his pcp. Pt normally ambulates with either a walker or a cane. He has been using his walker lately because he has been weak for a couple of days. Pt admits that he has a cpap at home but does not use it.

## 2023-10-20 NOTE — FLOWSHEET NOTE
10/20/23 1617   Assessment   Charting Type Admission   Psychosocial   Psychosocial (WDL) WDL   Neurological   Neuro (WDL) WDL   Level of Consciousness 0   Swallow Screening   Is the patient unable to remain alert for testing? No   Is the patient on a modified diet (thickened liquids) due to pre-existing dysphagia? No   Is there presence of existing enteral tube feeding via the stomach or nose? No   Is there presence of head-of-bed restrictions (less than 30 degrees)? No   [place] Coma Scale   Eye Opening 4   Best Verbal Response 5   Best Motor Response 6   [place] Coma Scale Score 15   HEENT (Head, Ears, Eyes, Nose, & Throat)   HEENT (WDL) WDL   Respiratory   Respiratory (WDL) X   Respiratory Pattern Regular   Respiratory Depth Normal   Respiratory Quality/Effort Unlabored   Chest Assessment Chest expansion symmetrical   L Breath Sounds Clear;Diminished   R Breath Sounds Clear;Diminished   Cardiac   Cardiac (WDL) WDL   Gastrointestinal   Abdominal (WDL) X   Abdomen Inspection Obese;Rounded   Last BM (including prior to admit) 10/18/23   RUQ Bowel Sounds Active   LUQ Bowel Sounds Active   RLQ Bowel Sounds Active   LLQ Bowel Sounds Active   Genitourinary   Genitourinary (WDL) X   Flank Tenderness Left   Urine Frequency   Urine Frequency No   Urine Assessment   Urinary Status Voiding   Peripheral Vascular   Peripheral Vascular (WDL) WDL   Edema None   Dual Clinician Skin Assessment   Dual Skin Assessment (4 Eyes) WDL   Second Clinical  (First and Last Name) Elpidio Bliss RN   Skin Integumentary    Skin Integumentary (WDL) X   Skin Color Pale   Skin Condition/Temp Dry; Warm   Skin Integrity Other (comment)  (thick rough skin on feet)   Location tyrone feet   Multiple Skin Integrity Sites No   Musculoskeletal   Musculoskeletal (WDL) WDL   Care Plan - Discharge Planning Goals   Discharge to home or other facility with appropriate resources Identify barriers to discharge with patient and caregiver; Identify discharge

## 2023-10-20 NOTE — PROGRESS NOTES
4 Eyes Skin Assessment     NAME:  Karlos Rivero  YOB: 1955  MEDICAL RECORD NUMBER:  [id number]    The patient is being assessed for  Admission    I agree that at least one RN has performed a thorough Head to Toe Skin Assessment on the patient. ALL assessment sites listed below have been assessed. Areas assessed by both nurses:    Head, Face, Ears, Shoulders, Back, Chest, Arms, Elbows, Hands, Sacrum. Buttock, Coccyx, Ischium, and Legs. Feet and Heels        Does the Patient have a Wound?  No noted wound(s)       Kristian Prevention initiated by RN: No  Wound Care Orders initiated by RN: No    Pressure Injury (Stage 3,4, Unstageable, DTI, NWPT, and Complex wounds) if present, place Wound referral order by RN under : No    New Ostomies, if present place, Ostomy referral order under : No     Nurse 1 eSignature: Electronically signed by Opal Vance RN on 10/20/23 at [address] PM EDT    **SHARE this note so that the co-signing nurse can place an eSignature**    Nurse 2 eSignature: Electronically signed by Royer Baptiste RN on 10/20/23 at 6:43 PM EDT

## 2023-10-20 NOTE — PROGRESS NOTES
After review of PDMP, oxycodone 10 mg in am and 5 mg at night updated to oxycodone/APAP 10/325 mg 1 tabs QAM and 0.5 tab QHS. Gabapentin renally adjusted for CrCl = 28 ml/min from 800 mg TID to 300 mg BID. Pantoprazole 40 mg IV discontinued since pantoprazole 40 mg po daily most recent order. Abhijeet Avendaño D.

## 2023-10-21 PROBLEM — A41.9 SEPSIS WITH ACUTE HYPOXIC RESPIRATORY FAILURE (HCC): Status: ACTIVE | Noted: 2023-10-21

## 2023-10-21 PROBLEM — N18.9 ACUTE KIDNEY INJURY SUPERIMPOSED ON CKD (HCC): Status: ACTIVE | Noted: 2023-10-21

## 2023-10-21 PROBLEM — J96.01 SEPSIS WITH ACUTE HYPOXIC RESPIRATORY FAILURE (HCC): Status: ACTIVE | Noted: 2023-10-21

## 2023-10-21 PROBLEM — J96.01 ACUTE RESPIRATORY FAILURE WITH HYPOXIA (HCC): Status: RESOLVED | Noted: 2023-10-20 | Resolved: 2023-10-21

## 2023-10-21 PROBLEM — D64.9 ANEMIA: Status: ACTIVE | Noted: 2023-10-21

## 2023-10-21 PROBLEM — R65.20 SEPSIS WITH ACUTE HYPOXIC RESPIRATORY FAILURE (HCC): Status: ACTIVE | Noted: 2023-10-21

## 2023-10-21 PROBLEM — N17.9 ACUTE KIDNEY INJURY SUPERIMPOSED ON CKD (HCC): Status: ACTIVE | Noted: 2023-10-21

## 2023-10-21 PROBLEM — J18.9 COMMUNITY ACQUIRED PNEUMONIA OF LEFT LOWER LOBE OF LUNG: Status: ACTIVE | Noted: 2023-10-21

## 2023-10-21 LAB
ALBUMIN SERPL-MCNC: 3.4 G/DL (ref 3.4–4.8)
ALBUMIN/GLOB SERPL: 1.2 {RATIO} (ref 0.8–2)
ALP SERPL-CCNC: 88 U/L (ref 25–100)
ALT SERPL-CCNC: 9 U/L (ref 4–36)
ANION GAP SERPL CALCULATED.3IONS-SCNC: 9 MMOL/L (ref 3–16)
AST SERPL-CCNC: 8 U/L (ref 8–33)
BACTERIA UR CULT: NORMAL
BILIRUB SERPL-MCNC: 0.7 MG/DL (ref 0.3–1.2)
BUN SERPL-MCNC: 51 MG/DL (ref 6–20)
CALCIUM SERPL-MCNC: 8.3 MG/DL (ref 8.5–10.5)
CHLORIDE SERPL-SCNC: 102 MMOL/L (ref 98–107)
CO2 SERPL-SCNC: 24 MMOL/L (ref 20–30)
CREAT SERPL-MCNC: 3.4 MG/DL (ref 0.4–1.2)
ERYTHROCYTE [DISTWIDTH] IN BLOOD BY AUTOMATED COUNT: 12.8 % (ref 11–16)
FOLATE SERPL-MCNC: 2.19 NG/ML
GFR SERPLBLD CREATININE-BSD FMLA CKD-EPI: 19 ML/MIN/{1.73_M2}
GLOBULIN SER CALC-MCNC: 2.8 G/DL
GLUCOSE BLD-MCNC: 158 MG/DL (ref 74–106)
GLUCOSE BLD-MCNC: 173 MG/DL (ref 74–106)
GLUCOSE BLD-MCNC: 202 MG/DL (ref 74–106)
GLUCOSE BLD-MCNC: 205 MG/DL (ref 74–106)
GLUCOSE SERPL-MCNC: 126 MG/DL (ref 74–106)
HBA1C MFR BLD: 7.7 %
HCT VFR BLD AUTO: 32.4 % (ref 40–54)
HGB BLD-MCNC: 10.5 G/DL (ref 13–18)
IRON SATN MFR SERPL: 22 % (ref 20–50)
IRON SERPL-MCNC: 33 UG/DL (ref 59–158)
MCH RBC QN AUTO: 30.1 PG (ref 27–32)
MCHC RBC AUTO-ENTMCNC: 32.4 G/DL (ref 31–35)
MCV RBC AUTO: 92.8 FL (ref 80–100)
PERFORMED ON: ABNORMAL
PLATELET # BLD AUTO: 206 K/UL (ref 150–400)
PMV BLD AUTO: 10.5 FL (ref 6–10)
POTASSIUM SERPL-SCNC: 4.6 MMOL/L (ref 3.4–5.1)
PROCALCITONIN SERPL IA-MCNC: 0.26 NG/ML (ref 0–0.15)
PROT SERPL-MCNC: 6.2 G/DL (ref 6.4–8.3)
RBC # BLD AUTO: 3.49 M/UL (ref 4.5–6)
SODIUM SERPL-SCNC: 135 MMOL/L (ref 136–145)
TIBC SERPL-MCNC: 152 UG/DL (ref 250–450)
TSH SERPL-MCNC: 1.04 UIU/ML (ref 0.27–4.2)
VIT B12 SERPL-MCNC: 257 PG/ML (ref 211–911)
WBC # BLD AUTO: 17.2 K/UL (ref 4–11)

## 2023-10-21 PROCEDURE — 36415 COLL VENOUS BLD VENIPUNCTURE: CPT

## 2023-10-21 PROCEDURE — 83540 ASSAY OF IRON: CPT

## 2023-10-21 PROCEDURE — 84443 ASSAY THYROID STIM HORMONE: CPT

## 2023-10-21 PROCEDURE — 2580000003 HC RX 258: Performed by: INTERNAL MEDICINE

## 2023-10-21 PROCEDURE — 85027 COMPLETE CBC AUTOMATED: CPT

## 2023-10-21 PROCEDURE — 83550 IRON BINDING TEST: CPT

## 2023-10-21 PROCEDURE — 83036 HEMOGLOBIN GLYCOSYLATED A1C: CPT

## 2023-10-21 PROCEDURE — 80053 COMPREHEN METABOLIC PANEL: CPT

## 2023-10-21 PROCEDURE — 6360000002 HC RX W HCPCS: Performed by: PHYSICIAN ASSISTANT

## 2023-10-21 PROCEDURE — 94669 MECHANICAL CHEST WALL OSCILL: CPT

## 2023-10-21 PROCEDURE — 1200000000 HC SEMI PRIVATE

## 2023-10-21 PROCEDURE — 94761 N-INVAS EAR/PLS OXIMETRY MLT: CPT

## 2023-10-21 PROCEDURE — 99223 1ST HOSP IP/OBS HIGH 75: CPT | Performed by: PHYSICIAN ASSISTANT

## 2023-10-21 PROCEDURE — 94640 AIRWAY INHALATION TREATMENT: CPT

## 2023-10-21 PROCEDURE — 6370000000 HC RX 637 (ALT 250 FOR IP): Performed by: PHYSICIAN ASSISTANT

## 2023-10-21 PROCEDURE — 2580000003 HC RX 258: Performed by: PHYSICIAN ASSISTANT

## 2023-10-21 PROCEDURE — 82607 VITAMIN B-12: CPT

## 2023-10-21 PROCEDURE — 51702 INSERT TEMP BLADDER CATH: CPT

## 2023-10-21 PROCEDURE — 6370000000 HC RX 637 (ALT 250 FOR IP): Performed by: INTERNAL MEDICINE

## 2023-10-21 PROCEDURE — 82746 ASSAY OF FOLIC ACID SERUM: CPT

## 2023-10-21 RX ORDER — TAMSULOSIN HYDROCHLORIDE 0.4 MG/1
0.4 CAPSULE ORAL DAILY
Status: DISCONTINUED | OUTPATIENT
Start: 2023-10-21 | End: 2023-10-23 | Stop reason: HOSPADM

## 2023-10-21 RX ORDER — 0.9 % SODIUM CHLORIDE 0.9 %
250 INTRAVENOUS SOLUTION INTRAVENOUS ONCE
Status: COMPLETED | OUTPATIENT
Start: 2023-10-21 | End: 2023-10-21

## 2023-10-21 RX ORDER — FINASTERIDE 5 MG/1
5 TABLET, FILM COATED ORAL DAILY
Status: DISCONTINUED | OUTPATIENT
Start: 2023-10-21 | End: 2023-10-23 | Stop reason: HOSPADM

## 2023-10-21 RX ORDER — CYANOCOBALAMIN 1000 UG/ML
1000 INJECTION, SOLUTION INTRAMUSCULAR; SUBCUTANEOUS ONCE
Status: COMPLETED | OUTPATIENT
Start: 2023-10-21 | End: 2023-10-21

## 2023-10-21 RX ORDER — FOLIC ACID 1 MG/1
1 TABLET ORAL DAILY
Status: DISCONTINUED | OUTPATIENT
Start: 2023-10-21 | End: 2023-10-23 | Stop reason: HOSPADM

## 2023-10-21 RX ORDER — DOXAZOSIN MESYLATE 1 MG/1
2 TABLET ORAL NIGHTLY
Status: DISCONTINUED | OUTPATIENT
Start: 2023-10-21 | End: 2023-10-21

## 2023-10-21 RX ORDER — FERROUS SULFATE TAB EC 324 MG (65 MG FE EQUIVALENT) 324 (65 FE) MG
324 TABLET DELAYED RESPONSE ORAL
Status: DISCONTINUED | OUTPATIENT
Start: 2023-10-22 | End: 2023-10-23 | Stop reason: HOSPADM

## 2023-10-21 RX ORDER — CARVEDILOL 12.5 MG/1
12.5 TABLET ORAL 2 TIMES DAILY
Status: DISCONTINUED | OUTPATIENT
Start: 2023-10-21 | End: 2023-10-23 | Stop reason: HOSPADM

## 2023-10-21 RX ADMIN — SODIUM CHLORIDE: 9 INJECTION, SOLUTION INTRAVENOUS at 01:11

## 2023-10-21 RX ADMIN — ACETAMINOPHEN 650 MG: 325 TABLET, FILM COATED ORAL at 00:19

## 2023-10-21 RX ADMIN — LEVOTHYROXINE SODIUM 88 MCG: 0.09 TABLET ORAL at 05:25

## 2023-10-21 RX ADMIN — FINASTERIDE 5 MG: 5 TABLET, FILM COATED ORAL at 14:02

## 2023-10-21 RX ADMIN — BUDESONIDE 500 MCG: 0.5 INHALANT RESPIRATORY (INHALATION) at 16:59

## 2023-10-21 RX ADMIN — GUAIFENESIN 600 MG: 600 TABLET, EXTENDED RELEASE ORAL at 09:35

## 2023-10-21 RX ADMIN — TAMSULOSIN HYDROCHLORIDE 0.4 MG: 0.4 CAPSULE ORAL at 14:02

## 2023-10-21 RX ADMIN — ENOXAPARIN SODIUM 30 MG: 100 INJECTION SUBCUTANEOUS at 09:35

## 2023-10-21 RX ADMIN — IPRATROPIUM BROMIDE AND ALBUTEROL SULFATE 1 DOSE: .5; 3 SOLUTION RESPIRATORY (INHALATION) at 16:59

## 2023-10-21 RX ADMIN — SODIUM CHLORIDE 250 ML: 9 INJECTION, SOLUTION INTRAVENOUS at 00:40

## 2023-10-21 RX ADMIN — ACETAMINOPHEN 650 MG: 325 TABLET, FILM COATED ORAL at 20:04

## 2023-10-21 RX ADMIN — SODIUM CHLORIDE: 9 INJECTION, SOLUTION INTRAVENOUS at 12:51

## 2023-10-21 RX ADMIN — DICLOFENAC SODIUM 2 G: 10 GEL TOPICAL at 09:36

## 2023-10-21 RX ADMIN — TRAZODONE HYDROCHLORIDE 100 MG: 50 TABLET ORAL at 20:06

## 2023-10-21 RX ADMIN — PRAVASTATIN SODIUM 80 MG: 20 TABLET ORAL at 20:06

## 2023-10-21 RX ADMIN — OXYCODONE AND ACETAMINOPHEN 0.5 TABLET: 10; 325 TABLET ORAL at 20:08

## 2023-10-21 RX ADMIN — CYANOCOBALAMIN 1000 MCG: 1000 INJECTION, SOLUTION INTRAMUSCULAR; SUBCUTANEOUS at 12:45

## 2023-10-21 RX ADMIN — CARVEDILOL 12.5 MG: 12.5 TABLET, FILM COATED ORAL at 09:35

## 2023-10-21 RX ADMIN — IPRATROPIUM BROMIDE AND ALBUTEROL SULFATE 1 DOSE: .5; 3 SOLUTION RESPIRATORY (INHALATION) at 04:54

## 2023-10-21 RX ADMIN — FOLIC ACID 1 MG: 1 TABLET ORAL at 12:49

## 2023-10-21 RX ADMIN — ALLOPURINOL 100 MG: 100 TABLET ORAL at 09:35

## 2023-10-21 RX ADMIN — GUAIFENESIN 600 MG: 600 TABLET, EXTENDED RELEASE ORAL at 20:04

## 2023-10-21 RX ADMIN — SODIUM CHLORIDE: 9 INJECTION, SOLUTION INTRAVENOUS at 22:59

## 2023-10-21 RX ADMIN — AZITHROMYCIN DIHYDRATE 250 MG: 250 TABLET ORAL at 09:35

## 2023-10-21 RX ADMIN — OXYCODONE AND ACETAMINOPHEN 1 TABLET: 10; 325 TABLET ORAL at 09:34

## 2023-10-21 RX ADMIN — CARVEDILOL 12.5 MG: 12.5 TABLET, FILM COATED ORAL at 20:06

## 2023-10-21 RX ADMIN — INSULIN LISPRO 1 UNITS: 100 INJECTION, SOLUTION INTRAVENOUS; SUBCUTANEOUS at 11:29

## 2023-10-21 RX ADMIN — GABAPENTIN 300 MG: 300 CAPSULE ORAL at 20:07

## 2023-10-21 RX ADMIN — CEFTRIAXONE 1000 MG: 1 INJECTION, POWDER, FOR SOLUTION INTRAMUSCULAR; INTRAVENOUS at 09:34

## 2023-10-21 RX ADMIN — INSULIN GLARGINE 40 UNITS: 100 INJECTION, SOLUTION SUBCUTANEOUS at 20:14

## 2023-10-21 RX ADMIN — PANTOPRAZOLE SODIUM 40 MG: 40 TABLET, DELAYED RELEASE ORAL at 05:25

## 2023-10-21 RX ADMIN — EPOETIN ALFA-EPBX 3700 UNITS: 10000 INJECTION, SOLUTION INTRAVENOUS; SUBCUTANEOUS at 12:43

## 2023-10-21 RX ADMIN — IPRATROPIUM BROMIDE AND ALBUTEROL SULFATE 1 DOSE: .5; 3 SOLUTION RESPIRATORY (INHALATION) at 14:17

## 2023-10-21 RX ADMIN — BUDESONIDE 500 MCG: 0.5 INHALANT RESPIRATORY (INHALATION) at 04:54

## 2023-10-21 RX ADMIN — GABAPENTIN 300 MG: 300 CAPSULE ORAL at 09:34

## 2023-10-21 ASSESSMENT — PAIN SCALES - GENERAL
PAINLEVEL_OUTOF10: 6
PAINLEVEL_OUTOF10: 2

## 2023-10-21 ASSESSMENT — PAIN DESCRIPTION - LOCATION: LOCATION: GENERALIZED

## 2023-10-21 ASSESSMENT — PAIN DESCRIPTION - DESCRIPTORS: DESCRIPTORS: ACHING

## 2023-10-21 NOTE — H&P
voluntary guarding  Musculoskeletal:  No cyanosis or obvious acute deformity. Moving all extremities   Integument:  Warm and dry. Lymphatic:  No cervical or axillary lymphadenopathy noted   Neurologic:  Alert & oriented x 3, no apparent focal deficits noted   Psychiatric:  Speech and behavior appropriate         Lab Results   Component Value Date     (L) 10/21/2023    K 4.6 10/21/2023     10/21/2023    CO2 24 10/21/2023    BUN 51 (H) 10/21/2023    CREATININE 3.4 (H) 10/21/2023    GLUCOSE 126 (H) 10/21/2023    CALCIUM 8.3 (L) 10/21/2023    PROT 6.2 (L) 10/21/2023    LABALBU 3.4 10/21/2023    BILITOT 0.7 10/21/2023    ALKPHOS 88 10/21/2023    AST 8 10/21/2023    ALT 9 10/21/2023    LABGLOM 19 (L) 10/21/2023    GFRAA 56 (L) 09/11/2022    AGRATIO 1.2 10/21/2023    GLOB 2.8 10/21/2023           Lab Results   Component Value Date    WBC 17.2 (H) 10/21/2023    HGB 10.5 (L) 10/21/2023    HCT 32.4 (L) 10/21/2023    MCV 92.8 10/21/2023     10/21/2023       PA/lat CXR:   CT ABDOMEN PELVIS WO CONTRAST Additional Contrast? None   Final Result      1. Left lower lobe pneumonia. Recommended follow-up to complete resolution. 2. Small hiatal hernia. 3. Prostatomegaly. XR CHEST PORTABLE   Final Result      Mild left basilar airspace disease, may relate to pneumonia or atelectasis. EKG: Normal sinus rhythm, short IN interval, heart rate 79 bpm, IN interval 58, QRS duration 105,  ms, QTc 405 ms. No acute ST-T wave changes. Assessment and Plan     Active Hospital Problems    Diagnosis Date Noted    Community acquired pneumonia of left lower lobe of lung [J18.9]  -Chest x-ray 10/20: Mild left basilar airspace disease, may relate to pneumonia or atelectasis  -CT A/P 10/20: Left lower lobe pneumonia. Recommend follow-up to complete resolution.  -Met criteria for sepsis upon arrival as below with tachypnea, new O2 demand, leukocytosis, fever and pneumonia.     -Blood cultures 10/20

## 2023-10-21 NOTE — FLOWSHEET NOTE
10/21/23 0944   Assessment   Charting Type Shift assessment   Psychosocial   Psychosocial (WDL) WDL   Neurological   Neuro (WDL) WDL   Level of Consciousness 0   [place] Coma Scale   Eye Opening 4   Best Verbal Response 5   Best Motor Response 6   [place] Coma Scale Score 15   HEENT (Head, Ears, Eyes, Nose, & Throat)   HEENT (WDL) WDL   Respiratory   Respiratory (WDL) X   Respiratory Interventions Cough & deep breathe;H. O.B. elevated   Respiratory Pattern Regular   Respiratory Depth Normal   Respiratory Quality/Effort Unlabored   Chest Assessment Chest expansion symmetrical   L Breath Sounds Clear;Diminished   R Breath Sounds Clear;Diminished   Breath Sounds   Right Upper Lobe Clear   Right Middle Lobe Clear   Right Lower Lobe Diminished   Left Upper Lobe Clear   Left Lower Lobe Diminished   Cardiac   Cardiac (WDL) WDL   Gastrointestinal   Abdominal (WDL) X   Abdomen Inspection Obese;Rounded; Taut   Last BM (including prior to admit) 10/18/23   RUQ Bowel Sounds Active   LUQ Bowel Sounds Active   RLQ Bowel Sounds Active   LLQ Bowel Sounds Active   Genitourinary   Genitourinary (WDL) X   Urine Assessment   Urinary Status Dawson   Urine Color Yellow/straw   Urine Appearance Clear   Urine Odor No odor   Peripheral Vascular   Peripheral Vascular (WDL) WDL   Edema None   Skin Integumentary    Skin Integumentary (WDL) X   Skin Color Pale   Skin Condition/Temp Dry; Warm   Skin Integrity Other (comment)  (thick rough)   Location JADA feet   Musculoskeletal   Musculoskeletal (WDL) WDL   Urinary Catheter 10/21/23 Dawson   Placement Date/Time: 10/21/23 3681   Present on Admission/Arrival: No  Insertion attempts: 1  Catheter Type: Dawson  Catheter Size: 16 FR  Catheter Balloon Size: 10 mL  Insertion Procedure Practices: Order written;Pre-connected closed drainage system;P. ..    Catheter Indications Urinary retention (acute or chronic), continuous bladder irrigation or bladder outlet obstruction   Site Assessment Pink   Urine Color

## 2023-10-21 NOTE — PLAN OF CARE
Problem: Discharge Planning  Goal: Discharge to home or other facility with appropriate resources  Outcome: Progressing  Flowsheets (Taken 10/21/2023 3973)  Discharge to home or other facility with appropriate resources:   Identify barriers to discharge with patient and caregiver   Identify discharge learning needs (meds, wound care, etc)     Problem: Pain  Goal: Verbalizes/displays adequate comfort level or baseline comfort level  Outcome: Progressing     Problem: Safety - Adult  Goal: Free from fall injury  Outcome: Progressing

## 2023-10-22 LAB
ALBUMIN SERPL-MCNC: 3.4 G/DL (ref 3.4–4.8)
ALBUMIN/GLOB SERPL: 1.2 {RATIO} (ref 0.8–2)
ALP SERPL-CCNC: 99 U/L (ref 25–100)
ALT SERPL-CCNC: 9 U/L (ref 4–36)
ANION GAP SERPL CALCULATED.3IONS-SCNC: 8 MMOL/L (ref 3–16)
AST SERPL-CCNC: 8 U/L (ref 8–33)
BILIRUB SERPL-MCNC: 0.4 MG/DL (ref 0.3–1.2)
BUN SERPL-MCNC: 46 MG/DL (ref 6–20)
CALCIUM SERPL-MCNC: 8.5 MG/DL (ref 8.5–10.5)
CHLORIDE SERPL-SCNC: 104 MMOL/L (ref 98–107)
CO2 SERPL-SCNC: 25 MMOL/L (ref 20–30)
CREAT SERPL-MCNC: 2.4 MG/DL (ref 0.4–1.2)
ERYTHROCYTE [DISTWIDTH] IN BLOOD BY AUTOMATED COUNT: 12.8 % (ref 11–16)
GFR SERPLBLD CREATININE-BSD FMLA CKD-EPI: 28 ML/MIN/{1.73_M2}
GLOBULIN SER CALC-MCNC: 2.9 G/DL
GLUCOSE BLD-MCNC: 120 MG/DL (ref 74–106)
GLUCOSE BLD-MCNC: 168 MG/DL (ref 74–106)
GLUCOSE BLD-MCNC: 200 MG/DL (ref 74–106)
GLUCOSE BLD-MCNC: 214 MG/DL (ref 74–106)
GLUCOSE SERPL-MCNC: 115 MG/DL (ref 74–106)
HCT VFR BLD AUTO: 32.4 % (ref 40–54)
HGB BLD-MCNC: 10.6 G/DL (ref 13–18)
MCH RBC QN AUTO: 30.4 PG (ref 27–32)
MCHC RBC AUTO-ENTMCNC: 32.7 G/DL (ref 31–35)
MCV RBC AUTO: 92.8 FL (ref 80–100)
PERFORMED ON: ABNORMAL
PLATELET # BLD AUTO: 208 K/UL (ref 150–400)
PMV BLD AUTO: 11.3 FL (ref 6–10)
POTASSIUM SERPL-SCNC: 4.7 MMOL/L (ref 3.4–5.1)
PROT SERPL-MCNC: 6.3 G/DL (ref 6.4–8.3)
RBC # BLD AUTO: 3.49 M/UL (ref 4.5–6)
SODIUM SERPL-SCNC: 137 MMOL/L (ref 136–145)
WBC # BLD AUTO: 13.3 K/UL (ref 4–11)

## 2023-10-22 PROCEDURE — 1200000000 HC SEMI PRIVATE

## 2023-10-22 PROCEDURE — 80053 COMPREHEN METABOLIC PANEL: CPT

## 2023-10-22 PROCEDURE — 99233 SBSQ HOSP IP/OBS HIGH 50: CPT | Performed by: PHYSICIAN ASSISTANT

## 2023-10-22 PROCEDURE — 94761 N-INVAS EAR/PLS OXIMETRY MLT: CPT

## 2023-10-22 PROCEDURE — 85027 COMPLETE CBC AUTOMATED: CPT

## 2023-10-22 PROCEDURE — 36415 COLL VENOUS BLD VENIPUNCTURE: CPT

## 2023-10-22 PROCEDURE — 2700000000 HC OXYGEN THERAPY PER DAY

## 2023-10-22 PROCEDURE — 6360000002 HC RX W HCPCS: Performed by: PHYSICIAN ASSISTANT

## 2023-10-22 PROCEDURE — 94640 AIRWAY INHALATION TREATMENT: CPT

## 2023-10-22 PROCEDURE — 2580000003 HC RX 258: Performed by: PHYSICIAN ASSISTANT

## 2023-10-22 PROCEDURE — 2580000003 HC RX 258: Performed by: INTERNAL MEDICINE

## 2023-10-22 PROCEDURE — 6370000000 HC RX 637 (ALT 250 FOR IP): Performed by: INTERNAL MEDICINE

## 2023-10-22 PROCEDURE — 6370000000 HC RX 637 (ALT 250 FOR IP): Performed by: PHYSICIAN ASSISTANT

## 2023-10-22 RX ADMIN — PANTOPRAZOLE SODIUM 40 MG: 40 TABLET, DELAYED RELEASE ORAL at 06:12

## 2023-10-22 RX ADMIN — BUDESONIDE 500 MCG: 0.5 INHALANT RESPIRATORY (INHALATION) at 04:07

## 2023-10-22 RX ADMIN — OXYCODONE AND ACETAMINOPHEN 1 TABLET: 10; 325 TABLET ORAL at 08:26

## 2023-10-22 RX ADMIN — ENOXAPARIN SODIUM 30 MG: 100 INJECTION SUBCUTANEOUS at 08:28

## 2023-10-22 RX ADMIN — GUAIFENESIN 600 MG: 600 TABLET, EXTENDED RELEASE ORAL at 20:31

## 2023-10-22 RX ADMIN — GABAPENTIN 300 MG: 300 CAPSULE ORAL at 08:27

## 2023-10-22 RX ADMIN — GUAIFENESIN 600 MG: 600 TABLET, EXTENDED RELEASE ORAL at 08:28

## 2023-10-22 RX ADMIN — INSULIN LISPRO 1 UNITS: 100 INJECTION, SOLUTION INTRAVENOUS; SUBCUTANEOUS at 17:06

## 2023-10-22 RX ADMIN — CARVEDILOL 12.5 MG: 12.5 TABLET, FILM COATED ORAL at 20:31

## 2023-10-22 RX ADMIN — IPRATROPIUM BROMIDE AND ALBUTEROL SULFATE 1 DOSE: .5; 3 SOLUTION RESPIRATORY (INHALATION) at 04:06

## 2023-10-22 RX ADMIN — TRAZODONE HYDROCHLORIDE 100 MG: 50 TABLET ORAL at 20:31

## 2023-10-22 RX ADMIN — CEFTRIAXONE 1000 MG: 1 INJECTION, POWDER, FOR SOLUTION INTRAMUSCULAR; INTRAVENOUS at 08:34

## 2023-10-22 RX ADMIN — TAMSULOSIN HYDROCHLORIDE 0.4 MG: 0.4 CAPSULE ORAL at 08:28

## 2023-10-22 RX ADMIN — FINASTERIDE 5 MG: 5 TABLET, FILM COATED ORAL at 08:28

## 2023-10-22 RX ADMIN — ALLOPURINOL 100 MG: 100 TABLET ORAL at 08:28

## 2023-10-22 RX ADMIN — AZITHROMYCIN DIHYDRATE 250 MG: 250 TABLET ORAL at 08:27

## 2023-10-22 RX ADMIN — GABAPENTIN 300 MG: 300 CAPSULE ORAL at 20:31

## 2023-10-22 RX ADMIN — SODIUM CHLORIDE: 9 INJECTION, SOLUTION INTRAVENOUS at 09:07

## 2023-10-22 RX ADMIN — LEVOTHYROXINE SODIUM 88 MCG: 0.09 TABLET ORAL at 06:12

## 2023-10-22 RX ADMIN — FOLIC ACID 1 MG: 1 TABLET ORAL at 08:28

## 2023-10-22 RX ADMIN — PRAVASTATIN SODIUM 80 MG: 20 TABLET ORAL at 20:31

## 2023-10-22 RX ADMIN — FERROUS SULFATE TAB EC 324 MG (65 MG FE EQUIVALENT) 324 MG: 324 (65 FE) TABLET DELAYED RESPONSE at 08:28

## 2023-10-22 RX ADMIN — INSULIN GLARGINE 40 UNITS: 100 INJECTION, SOLUTION SUBCUTANEOUS at 20:32

## 2023-10-22 RX ADMIN — IPRATROPIUM BROMIDE AND ALBUTEROL SULFATE 1 DOSE: .5; 3 SOLUTION RESPIRATORY (INHALATION) at 12:11

## 2023-10-22 RX ADMIN — CARVEDILOL 12.5 MG: 12.5 TABLET, FILM COATED ORAL at 08:28

## 2023-10-22 RX ADMIN — OXYCODONE AND ACETAMINOPHEN 0.5 TABLET: 10; 325 TABLET ORAL at 20:31

## 2023-10-22 ASSESSMENT — PAIN DESCRIPTION - DESCRIPTORS: DESCRIPTORS: OTHER (COMMENT)

## 2023-10-22 ASSESSMENT — PAIN SCALES - GENERAL
PAINLEVEL_OUTOF10: 6
PAINLEVEL_OUTOF10: 7

## 2023-10-22 ASSESSMENT — PAIN DESCRIPTION - LOCATION: LOCATION: CHEST;SHOULDER;BACK

## 2023-10-22 NOTE — PROGRESS NOTES
Dawson catheter removed per order-Pt tolerated well- Aseptic technique Will monitor for urine output-

## 2023-10-22 NOTE — PROGRESS NOTES
Progress Note      Subjective:   Chief complaint:   Acute encephalopathy  Abdominal pain  Fatigue    Interval History:   Patient seen and examined this morning. He is much more awake and alert. He reports feeling better but not yet back to baseline. He states he has been out of bed and ambulated to the bathroom without any issues. He is still requiring 2 L nasal cannula. Renal function continues to improve. Dawson catheter removed this morning with no issues. Patient denies any issues voiding PTA. Review of systems:   Constitutional:  Denies fever or chills. Positive for generalized weakness and fatigue, improving  Eyes:  Denies eye pain or redness  HENT:  Denies nasal congestion or sore throat   Respiratory:  Denies cough or shortness of breath   Cardiovascular:  Denies chest pain or edema   GI:  Denies nausea, vomiting, bloody stools or diarrhea. Positive for left upper quadrant abdominal pain, improving  :  Denies dysuria or frequency  Musculoskeletal:  Denies acute neck pain or body aches  Integument:  Denies rash or itching  Neurologic:  Denies headache, dizziness, numbness, tingling or unilateral weakness. Positive for confusion PTA, resolved. Psychiatric:  Denies acute depression or acute anxiety    Past medical history, surgical history, family history and social history reviewed and unchanged compared to H&P earlier this admission.     Medications:   Scheduled Meds:   carvedilol  12.5 mg Oral BID    ferrous sulfate  324 mg Oral Daily with breakfast    folic acid  1 mg Oral Daily    tamsulosin  0.4 mg Oral Daily    finasteride  5 mg Oral Daily    allopurinol  100 mg Oral Daily    insulin lispro  0-4 Units SubCUTAneous TID WC    insulin lispro  0-4 Units SubCUTAneous Nightly    [Held by provider] diclofenac sodium  2 g Topical BID    gabapentin  300 mg Oral BID    insulin glargine  40 Units SubCUTAneous Nightly    levothyroxine  88 mcg Oral Daily    [Held by provider] metOLazone  2.5 mg Oral

## 2023-10-22 NOTE — PLAN OF CARE
Problem: Safety - Adult  Goal: Free from fall injury  10/22/2023 0914 by Beckie Saleem RN  Outcome: Progressing  10/22/2023 0143 by Gianfranco Sandhu RN  Outcome: Progressing

## 2023-10-22 NOTE — FLOWSHEET NOTE
10/22/23 0830   Assessment   Charting Type Shift assessment   Psychosocial   Psychosocial (WDL) WDL   Neurological   Neuro (WDL) WDL   Level of Consciousness 0   Swallow Screening   Is the patient unable to remain alert for testing? No   Is the patient on a modified diet (thickened liquids) due to pre-existing dysphagia? No   Is there presence of existing enteral tube feeding via the stomach or nose? No   Is there presence of head-of-bed restrictions (less than 30 degrees)? No   Is there presence of tracheotomy tube? No   Is the patient ordered nothing-by-mouth status?  No   3 oz Water Swallow Screen Pass   [place] Coma Scale   Eye Opening 4   Best Verbal Response 5   Best Motor Response 6   [place] Coma Scale Score 15   NIHSS Stroke Scale   NIHSS Stroke Scale Assessed No   HEENT (Head, Ears, Eyes, Nose, & Throat)   HEENT (WDL) WDL   Respiratory   Respiratory (WDL) X   Respiratory Pattern Regular   Respiratory Depth Normal   Respiratory Quality/Effort Unlabored   Chest Assessment Chest expansion symmetrical   L Breath Sounds Clear;Diminished   R Breath Sounds Clear;Diminished   Breath Sounds   Right Upper Lobe Clear   Right Middle Lobe Clear   Right Lower Lobe Diminished   Left Upper Lobe Clear   Left Lower Lobe Diminished   Cardiac   Cardiac (WDL) WDL   Gastrointestinal   Abdominal (WDL) X   Abdomen Inspection Obese;Rounded   RUQ Bowel Sounds Active   LUQ Bowel Sounds Active   RLQ Bowel Sounds Active   LLQ Bowel Sounds Active   Genitourinary   Genitourinary (WDL) X  (quigley intact and draining)   Urine Assessment   Urine Color Yellow/straw   Urine Appearance Clear   Urine Odor No odor   Peripheral Vascular   Peripheral Vascular (WDL) WDL   Edema None   Skin Integumentary    Skin Integumentary (WDL) X   Skin Color Pale   Skin Condition/Temp Warm   Musculoskeletal   Musculoskeletal (WDL) WDL   Urinary Catheter 10/21/23 Quigley   Placement Date/Time: 10/21/23 0392   Present on Admission/Arrival: No  Insertion attempts:

## 2023-10-23 VITALS
WEIGHT: 315 LBS | TEMPERATURE: 98.1 F | HEART RATE: 58 BPM | SYSTOLIC BLOOD PRESSURE: 151 MMHG | DIASTOLIC BLOOD PRESSURE: 76 MMHG | BODY MASS INDEX: 42.66 KG/M2 | HEIGHT: 72 IN | RESPIRATION RATE: 18 BRPM | OXYGEN SATURATION: 93 %

## 2023-10-23 LAB
ALBUMIN SERPL-MCNC: 3.3 G/DL (ref 3.4–4.8)
ALBUMIN/GLOB SERPL: 1.2 {RATIO} (ref 0.8–2)
ALP SERPL-CCNC: 106 U/L (ref 25–100)
ALT SERPL-CCNC: 9 U/L (ref 4–36)
ANION GAP SERPL CALCULATED.3IONS-SCNC: 8 MMOL/L (ref 3–16)
AST SERPL-CCNC: 11 U/L (ref 8–33)
BILIRUB SERPL-MCNC: 0.4 MG/DL (ref 0.3–1.2)
BUN SERPL-MCNC: 36 MG/DL (ref 6–20)
CALCIUM SERPL-MCNC: 8.7 MG/DL (ref 8.5–10.5)
CHLORIDE SERPL-SCNC: 109 MMOL/L (ref 98–107)
CO2 SERPL-SCNC: 24 MMOL/L (ref 20–30)
CREAT SERPL-MCNC: 1.8 MG/DL (ref 0.4–1.2)
ERYTHROCYTE [DISTWIDTH] IN BLOOD BY AUTOMATED COUNT: 12.4 % (ref 11–16)
GFR SERPLBLD CREATININE-BSD FMLA CKD-EPI: 40 ML/MIN/{1.73_M2}
GLOBULIN SER CALC-MCNC: 2.8 G/DL
GLUCOSE BLD-MCNC: 116 MG/DL (ref 74–106)
GLUCOSE BLD-MCNC: 125 MG/DL (ref 74–106)
GLUCOSE SERPL-MCNC: 124 MG/DL (ref 74–106)
HCT VFR BLD AUTO: 30.4 % (ref 40–54)
HGB BLD-MCNC: 10.1 G/DL (ref 13–18)
MCH RBC QN AUTO: 30.2 PG (ref 27–32)
MCHC RBC AUTO-ENTMCNC: 33.2 G/DL (ref 31–35)
MCV RBC AUTO: 91 FL (ref 80–100)
PERFORMED ON: ABNORMAL
PERFORMED ON: ABNORMAL
PLATELET # BLD AUTO: 245 K/UL (ref 150–400)
PMV BLD AUTO: 10.6 FL (ref 6–10)
POTASSIUM SERPL-SCNC: 4.3 MMOL/L (ref 3.4–5.1)
PROT SERPL-MCNC: 6.1 G/DL (ref 6.4–8.3)
RBC # BLD AUTO: 3.34 M/UL (ref 4.5–6)
SODIUM SERPL-SCNC: 141 MMOL/L (ref 136–145)
WBC # BLD AUTO: 11.5 K/UL (ref 4–11)

## 2023-10-23 PROCEDURE — 6370000000 HC RX 637 (ALT 250 FOR IP): Performed by: INTERNAL MEDICINE

## 2023-10-23 PROCEDURE — 36415 COLL VENOUS BLD VENIPUNCTURE: CPT

## 2023-10-23 PROCEDURE — 6360000002 HC RX W HCPCS: Performed by: PHYSICIAN ASSISTANT

## 2023-10-23 PROCEDURE — 2580000003 HC RX 258: Performed by: INTERNAL MEDICINE

## 2023-10-23 PROCEDURE — 6370000000 HC RX 637 (ALT 250 FOR IP): Performed by: PHYSICIAN ASSISTANT

## 2023-10-23 PROCEDURE — 80053 COMPREHEN METABOLIC PANEL: CPT

## 2023-10-23 PROCEDURE — 94669 MECHANICAL CHEST WALL OSCILL: CPT

## 2023-10-23 PROCEDURE — 85027 COMPLETE CBC AUTOMATED: CPT

## 2023-10-23 PROCEDURE — 99238 HOSP IP/OBS DSCHRG MGMT 30/<: CPT | Performed by: PHYSICIAN ASSISTANT

## 2023-10-23 PROCEDURE — 94618 PULMONARY STRESS TESTING: CPT

## 2023-10-23 PROCEDURE — 2580000003 HC RX 258: Performed by: PHYSICIAN ASSISTANT

## 2023-10-23 PROCEDURE — 94640 AIRWAY INHALATION TREATMENT: CPT

## 2023-10-23 RX ORDER — VITAMIN B COMPLEX
2500 TABLET ORAL DAILY
Qty: 30 TABLET | Refills: 0 | Status: SHIPPED | OUTPATIENT
Start: 2023-10-23

## 2023-10-23 RX ORDER — CEFDINIR 300 MG/1
300 CAPSULE ORAL 2 TIMES DAILY
Qty: 10 CAPSULE | Refills: 0 | Status: SHIPPED | OUTPATIENT
Start: 2023-10-23 | End: 2023-10-28

## 2023-10-23 RX ORDER — AZITHROMYCIN 250 MG/1
250 TABLET, FILM COATED ORAL DAILY
Qty: 1 TABLET | Refills: 0 | Status: SHIPPED | OUTPATIENT
Start: 2023-10-24 | End: 2023-10-25

## 2023-10-23 RX ORDER — FOLIC ACID 1 MG/1
1 TABLET ORAL DAILY
Qty: 30 TABLET | Refills: 0 | Status: SHIPPED | OUTPATIENT
Start: 2023-10-24

## 2023-10-23 RX ORDER — TAMSULOSIN HYDROCHLORIDE 0.4 MG/1
0.4 CAPSULE ORAL DAILY
Qty: 30 CAPSULE | Refills: 0 | Status: SHIPPED | OUTPATIENT
Start: 2023-10-24

## 2023-10-23 RX ORDER — INSULIN GLARGINE 100 [IU]/ML
40 INJECTION, SOLUTION SUBCUTANEOUS NIGHTLY
Qty: 90 ML | Refills: 3 | Status: SHIPPED
Start: 2023-10-23

## 2023-10-23 RX ORDER — LISINOPRIL 5 MG/1
5 TABLET ORAL DAILY
Qty: 30 TABLET | Refills: 0 | Status: SHIPPED | OUTPATIENT
Start: 2023-10-23

## 2023-10-23 RX ORDER — CARVEDILOL 25 MG/1
12.5 TABLET ORAL 2 TIMES DAILY
Qty: 180 TABLET | Refills: 3 | Status: SHIPPED
Start: 2023-10-23

## 2023-10-23 RX ORDER — GUAIFENESIN 600 MG/1
600 TABLET, EXTENDED RELEASE ORAL 2 TIMES DAILY
Qty: 10 TABLET | Refills: 0 | Status: SHIPPED | OUTPATIENT
Start: 2023-10-23 | End: 2023-10-28

## 2023-10-23 RX ORDER — FINASTERIDE 5 MG/1
5 TABLET, FILM COATED ORAL DAILY
Qty: 30 TABLET | Refills: 0 | Status: SHIPPED | OUTPATIENT
Start: 2023-10-24

## 2023-10-23 RX ORDER — GABAPENTIN 300 MG/1
600 CAPSULE ORAL 3 TIMES DAILY
Status: DISCONTINUED | OUTPATIENT
Start: 2023-10-23 | End: 2023-10-23 | Stop reason: HOSPADM

## 2023-10-23 RX ORDER — METOLAZONE 2.5 MG/1
2.5 TABLET ORAL PRN
Qty: 45 TABLET | Refills: 3 | Status: SHIPPED
Start: 2023-10-23

## 2023-10-23 RX ORDER — FERROUS SULFATE TAB EC 324 MG (65 MG FE EQUIVALENT) 324 (65 FE) MG
324 TABLET DELAYED RESPONSE ORAL
Qty: 30 TABLET | Refills: 0 | Status: SHIPPED | OUTPATIENT
Start: 2023-10-24

## 2023-10-23 RX ORDER — CHOLECALCIFEROL (VITAMIN D3) 125 MCG
1000 CAPSULE ORAL DAILY
Status: DISCONTINUED | OUTPATIENT
Start: 2023-10-23 | End: 2023-10-23 | Stop reason: HOSPADM

## 2023-10-23 RX ORDER — ENOXAPARIN SODIUM 100 MG/ML
30 INJECTION SUBCUTANEOUS 2 TIMES DAILY
Status: DISCONTINUED | OUTPATIENT
Start: 2023-10-23 | End: 2023-10-23 | Stop reason: HOSPADM

## 2023-10-23 RX ADMIN — GUAIFENESIN 600 MG: 600 TABLET, EXTENDED RELEASE ORAL at 08:24

## 2023-10-23 RX ADMIN — OXYCODONE AND ACETAMINOPHEN 1 TABLET: 10; 325 TABLET ORAL at 08:31

## 2023-10-23 RX ADMIN — TAMSULOSIN HYDROCHLORIDE 0.4 MG: 0.4 CAPSULE ORAL at 08:24

## 2023-10-23 RX ADMIN — LEVOTHYROXINE SODIUM 88 MCG: 0.09 TABLET ORAL at 06:12

## 2023-10-23 RX ADMIN — IPRATROPIUM BROMIDE AND ALBUTEROL SULFATE 1 DOSE: .5; 3 SOLUTION RESPIRATORY (INHALATION) at 05:01

## 2023-10-23 RX ADMIN — GABAPENTIN 300 MG: 300 CAPSULE ORAL at 08:31

## 2023-10-23 RX ADMIN — ENOXAPARIN SODIUM 30 MG: 100 INJECTION SUBCUTANEOUS at 08:24

## 2023-10-23 RX ADMIN — SODIUM CHLORIDE: 9 INJECTION, SOLUTION INTRAVENOUS at 06:12

## 2023-10-23 RX ADMIN — CYANOCOBALAMIN TAB 500 MCG 1000 MCG: 500 TAB at 10:54

## 2023-10-23 RX ADMIN — FINASTERIDE 5 MG: 5 TABLET, FILM COATED ORAL at 08:24

## 2023-10-23 RX ADMIN — AZITHROMYCIN DIHYDRATE 250 MG: 250 TABLET ORAL at 08:25

## 2023-10-23 RX ADMIN — CEFTRIAXONE 1000 MG: 1 INJECTION, POWDER, FOR SOLUTION INTRAMUSCULAR; INTRAVENOUS at 08:34

## 2023-10-23 RX ADMIN — FERROUS SULFATE TAB EC 324 MG (65 MG FE EQUIVALENT) 324 MG: 324 (65 FE) TABLET DELAYED RESPONSE at 08:24

## 2023-10-23 RX ADMIN — ALLOPURINOL 100 MG: 100 TABLET ORAL at 08:24

## 2023-10-23 RX ADMIN — CARVEDILOL 12.5 MG: 12.5 TABLET, FILM COATED ORAL at 08:24

## 2023-10-23 RX ADMIN — BUDESONIDE 500 MCG: 0.5 INHALANT RESPIRATORY (INHALATION) at 05:01

## 2023-10-23 RX ADMIN — PANTOPRAZOLE SODIUM 40 MG: 40 TABLET, DELAYED RELEASE ORAL at 06:12

## 2023-10-23 RX ADMIN — FOLIC ACID 1 MG: 1 TABLET ORAL at 08:24

## 2023-10-23 NOTE — PROGRESS NOTES
Pt [address] home, IV and telemetry monitor removed. Written and oral instructions given. Pt and spouse stated understanding of instructions.

## 2023-10-23 NOTE — DISCHARGE SUMMARY
TAKE 1 TABLET BY MOUTH DAILY for sugar  Qty: 90 tablet, Refills: 3      pravastatin (PRAVACHOL) 80 MG tablet TAKE 1 TABLET BY MOUTH EVERY EVENING for cholesterol  Qty: 90 tablet, Refills: 3      allopurinol (ZYLOPRIM) 100 MG tablet Take 1 tablet by mouth daily For gout  Qty: 90 tablet, Refills: 3      Needles & Syringes MISC 1 each by Does not apply route daily u-100 insulin syringe  Qty: 50 each, Refills: 5      !! FREESTYLE LANCETS MISC TEST TWO TIMES A DAY  Qty: 100 each, Refills: 5    Comments: E11.9  Associated Diagnoses: Uncontrolled diabetes mellitus      !! FREESTYLE LITE strip TEST BLOOD SUGAR TWO TIMES A DAY  Qty: 100 strip, Refills: 5    Comments: E11.9  Associated Diagnoses: Uncontrolled diabetes mellitus      Respiratory Therapy Supplies PREMA by Does not apply route. Qty: 1 Device, Refills: 0    Associated Diagnoses: Unspecified sleep apnea       !! - Potential duplicate medications found. Please discuss with provider. 14 minutes spent discharging this patient. Patient seen and examined with Dr. Jayda Cuevas. Signed:  Electronically signed by RADHAMES He on 10/23/2023 at 11:01 AM       Thank you TANESHA Fragoso for the opportunity to be involved in this patient's care. If you have any questions or concerns please feel free to contact me at [phone].

## 2023-10-23 NOTE — ACP (ADVANCE CARE PLANNING)
Advance Care Planning     General Advance Care Planning (ACP) Conversation    Date of Conversation: 10/20/2023  Conducted with: Patient with Decision Making Capacity    Healthcare Decision Maker:    Primary Decision Maker: Winnie Perez - Spouse - [phone]    Secondary Decision Maker: Kota Shelby Child - [phone]    Secondary Decision Maker: Carlee Hankins - Child - [phone]  Click here to complete Healthcare Decision Makers including selection of the Healthcare Decision Maker Relationship (ie \"Primary\"). Today we documented Decision Maker(s) consistent with Legal Next of Kin hierarchy.     Content/Action Overview:  Has NO ACP documents/care preferences - information provided, considering goals and options  Reviewed DNR/DNI and patient elects Full Code (Attempt Resuscitation)    Length of Voluntary ACP Conversation in minutes:  <16 minutes (Non-Billable)

## 2023-10-23 NOTE — FLOWSHEET NOTE
10/22/23 2115   Assessment   Charting Type Shift assessment   Psychosocial   Psychosocial (WDL) WDL   Neurological   Neuro (WDL) WDL   Level of Consciousness 0   [place] Coma Scale   Eye Opening 4   Best Verbal Response 5   Best Motor Response 6   [place] Coma Scale Score 15   HEENT (Head, Ears, Eyes, Nose, & Throat)   HEENT (WDL) WDL   Respiratory   Respiratory (WDL) X   Respiratory Pattern Regular   Respiratory Depth Normal   Respiratory Quality/Effort Unlabored   Chest Assessment Chest expansion symmetrical   L Breath Sounds Clear;Diminished   R Breath Sounds Clear;Diminished   Breath Sounds   Right Upper Lobe Clear   Right Middle Lobe Clear   Right Lower Lobe Diminished   Left Upper Lobe Clear   Left Lower Lobe Diminished   Cardiac   Cardiac (WDL) WDL   Gastrointestinal   Abdominal (WDL) X   Abdomen Inspection Obese;Rounded   RUQ Bowel Sounds Active   LUQ Bowel Sounds Active   RLQ Bowel Sounds Active   LLQ Bowel Sounds Active   Genitourinary   Genitourinary (WDL) X  (quigley intact and draining)   Urine Assessment   Urine Color Yellow/straw   Urine Appearance Clear   Urine Odor No odor   Peripheral Vascular   Peripheral Vascular (WDL) WDL   Edema None   Skin Integumentary    Skin Integumentary (WDL) X   Skin Color Pale   Skin Condition/Temp Warm   Musculoskeletal   Musculoskeletal (WDL) WDL

## 2023-10-23 NOTE — CARE COORDINATION
Attempted PT eval with patient. Patient observed ambulating in the hallway with RW ~400' and SBA from RT. When patient was returned to his room, PT followed up with him and he states he feels like he is at his baseline functional level and is not having difficulty with any mobility tasks. He further states he has all necessary equipment at home (RW, BSC, SC, cpap). No eval completed d/t no skilled needs identified and patient appears to be at his baseline functional level.
Independent in ADLs/IADLs    Family can provide assistance at DC: Yes  Would you like Case Management to discuss the discharge plan with any other family members/significant others, and if so, who? Yes  Plans to Return to Present Housing: Yes  Other Identified Issues/Barriers to RETURNING to current housing: none  Potential Assistance needed at discharge: N/A            Potential DME:  none  Patient expects to discharge to: [address] [place] [address] for transportation at discharge: Self    Financial    Payor: [facility] / Plan: Trina Calles / Product Type: *No Product type* /     Does insurance require precert for SNF: Yes    Potential assistance Purchasing Medications: No  Meds-to-Beds request:        [address] [facility] [phone] - F [phone] [address] [facility] [address]  Phone: [phone] Fax: [address], [address] [facility] [address] [facility] [phone]  [address] [address]  Phone: [phone] Fax: [phone]      Notes:    Factors facilitating achievement of predicted outcomes: Family support, Motivated, Cooperative, Pleasant, and Has needed Durable Medical Equipment at home    Barriers to discharge: Medical complications    Additional Case Management Notes: Lives with family. Has Cpap, RW, BSC, SC. I at baseline. Pt passed 6 minute walk. No DC needs noted at this time.       The Plan for Transition of Care is related to the following treatment goals of Confusion [R41.0]  Acute cystitis without hematuria [N30.00]  Acute respiratory failure with hypoxia (HCC) [J96.01]  PNA (pneumonia) [J18.9]  Pneumonia of left lower lobe due to infectious organism [J18.9]  Acute renal failure superimposed on stage 3 chronic kidney disease, unspecified acute renal failure type, unspecified whether stage 3a or 3b CKD ([address]) [N17.9, P11.15]    IF APPLICABLE: The Patient and/or

## 2023-10-23 NOTE — DISCHARGE INSTRUCTIONS
Disposition: home  Discharged Condition: Stable  Activity: activity as tolerated  Diet: cardiac diet, diabetic diet, and renal diet  Follow Up: Primary Care Provider in 1 week. Follow up with nephrology as scheduled. Patient to proceed with the following lab (cbc, bmp) in 2 weeks  Monitor your blood pressure daily and your blood sugar 2-3 times daily and write down readings to take to pcp appointment so diabetes medications can be adjusted if needed.

## 2023-10-23 NOTE — CARDIO/PULMONARY
6 MINUTE WALK TEST    Exercise type:  Hallway at patient's own pace. O2 sat RA/O2LPM RR VANDANA HR BP   Rest 92 RA 18  92    1 min 92 RA 18  97    2 min 90 RA 20  87    3 min 90 RA 20  95    4 min 91 RA 20  94    5 min 90 RA 20  90    6 min 92 RA 20  92    Recovery 91 RA 18  94      Distance walked: 450 feet    Breath sounds/patterns:  Pt was clear/diminished, regular respiratory pattern     Comments:  Pt walked steadily at his own pace. Pt had no signs of distress or shortness of air.

## 2023-10-23 NOTE — PLAN OF CARE
Problem: Discharge Planning  Goal: Discharge to home or other facility with appropriate resources  Outcome: Adequate for Discharge     Problem: Pain  Goal: Verbalizes/displays adequate comfort level or baseline comfort level  Outcome: Adequate for Discharge     Problem: Safety - Adult  Goal: Free from fall injury  Outcome: Adequate for Discharge     Problem: Respiratory - Adult  Goal: Achieves optimal ventilation and oxygenation  Outcome: Adequate for Discharge     Problem: Chronic Conditions and Co-morbidities  Goal: Patient's chronic conditions and co-morbidity symptoms are monitored and maintained or improved  Outcome: Adequate for Discharge

## 2023-10-24 NOTE — PROGRESS NOTES
CLINICAL PHARMACY NOTE: MEDS TO BEDS    Total # of Prescriptions Filled: 9   The following medications were delivered to the patient:  Discharge Medication List as of 10/23/2023  1:14 PM        START taking these medications    Details   azithromycin (ZITHROMAX) 250 MG tablet Take 1 tablet by mouth daily for 1 dose, Disp-1 tablet, R-0Normal      cefdinir (OMNICEF) 300 MG capsule Take 1 capsule by mouth 2 times daily for 5 days, Disp-10 capsule, R-0Normal      ferrous sulfate 324 (65 Fe) MG EC tablet Take 1 tablet by mouth daily (with breakfast), Disp-30 tablet, R-0Normal      finasteride (PROSCAR) 5 MG tablet Take 1 tablet by mouth daily, Disp-30 tablet, [id number]      folic acid (FOLVITE) 1 MG tablet Take 1 tablet by mouth daily, Disp-30 tablet, R-0Normal      guaiFENesin (MUCINEX) 600 MG extended release tablet Take 1 tablet by mouth 2 times daily for 5 days, Disp-10 tablet, R-0Normal      sublingual tablet cyanocobalamin 2500 MCG SUBL Place 1 tablet under the tongue daily, Disp-30 tablet, R-0Normal      tamsulosin (FLOMAX) 0.4 MG capsule Take 1 capsule by mouth daily, Disp-30 capsule, R-0Normal         Lisinopril 5 mg tablet  Take 1 tablet by mouth daily  Qty: 30 tablets refill: 0    Additional Documentation:   Medications were delivered to patient's room and signed for by his wife, Savageezekiel Lev

## 2023-10-25 LAB
BACTERIA BLD CULT ORG #2: NORMAL
BACTERIA BLD CULT: NORMAL

## 2023-11-07 ENCOUNTER — HOSPITAL ENCOUNTER (OUTPATIENT)
Dept: SLEEP CENTER | Facility: HOSPITAL | Age: 68
Discharge: HOME | End: 2023-11-09
Payer: MEDICARE

## 2023-11-07 PROCEDURE — 95810 POLYSOM 6/> YRS 4/> PARAM: CPT

## 2023-11-09 NOTE — PROCEDURES
Media Information      Interpretation:     Mild obstructive sleep apnea. Absent REM sleep. Obesity. Subjective hypersomnia. Recommendations:    Consider Auto CPAP to titrate between 8cm water pressure and 20cm water pressure. Weight loss and exercise. Avoid risky activity such as driving if sleepy. Discuss sleep hygiene with the patient. Monitor PAP use and effectiveness.        Clara Bergeron MD, FCCP, [facility]

## 2023-11-17 ENCOUNTER — HOSPITAL ENCOUNTER (OUTPATIENT)
Dept: GENERAL RADIOLOGY | Facility: HOSPITAL | Age: 68
Discharge: HOME | End: 2023-11-17
Payer: MEDICARE

## 2023-11-17 ENCOUNTER — HOSPITAL ENCOUNTER (OUTPATIENT)
Facility: HOSPITAL | Age: 68
Discharge: HOME | End: 2023-11-17
Payer: MEDICARE

## 2023-11-17 DIAGNOSIS — J18.9 UNRESOLVED PNEUMONIA: ICD-10-CM

## 2023-11-17 PROCEDURE — 71046 X-RAY EXAM CHEST 2 VIEWS: CPT

## 2023-12-12 ENCOUNTER — HOSPITAL ENCOUNTER (INPATIENT)
Facility: HOSPITAL | Age: 68
LOS: 4 days | Discharge: HOME | End: 2023-12-16
Attending: HOSPITALIST | Admitting: INTERNAL MEDICINE
Payer: MEDICARE

## 2023-12-12 ENCOUNTER — APPOINTMENT (OUTPATIENT)
Dept: GENERAL RADIOLOGY | Facility: HOSPITAL | Age: 68
End: 2023-12-12
Attending: HOSPITALIST
Payer: MEDICARE

## 2023-12-12 DIAGNOSIS — U07.1 PNEUMONIA DUE TO COVID-19 VIRUS: ICD-10-CM

## 2023-12-12 DIAGNOSIS — I10 PRIMARY HYPERTENSION: Chronic | ICD-10-CM

## 2023-12-12 DIAGNOSIS — Z79.4 TYPE 2 DIABETES MELLITUS WITH DIABETIC NEUROPATHY, WITH LONG-TERM CURRENT USE OF INSULIN (HCC): ICD-10-CM

## 2023-12-12 DIAGNOSIS — N18.9 ACUTE KIDNEY INJURY SUPERIMPOSED ON CKD (HCC): ICD-10-CM

## 2023-12-12 DIAGNOSIS — J96.01 ACUTE RESPIRATORY FAILURE WITH HYPOXIA (HCC): Primary | ICD-10-CM

## 2023-12-12 DIAGNOSIS — U07.1 COVID-19: ICD-10-CM

## 2023-12-12 DIAGNOSIS — E11.40 TYPE 2 DIABETES MELLITUS WITH DIABETIC NEUROPATHY, WITH LONG-TERM CURRENT USE OF INSULIN (HCC): ICD-10-CM

## 2023-12-12 DIAGNOSIS — N17.9 ACUTE KIDNEY INJURY SUPERIMPOSED ON CKD (HCC): ICD-10-CM

## 2023-12-12 DIAGNOSIS — J12.82 PNEUMONIA DUE TO COVID-19 VIRUS: ICD-10-CM

## 2023-12-12 LAB
ALBUMIN SERPL-MCNC: 3.9 G/DL (ref 3.4–4.8)
ALBUMIN/GLOB SERPL: 1.5 {RATIO} (ref 0.8–2)
ALP SERPL-CCNC: 103 U/L (ref 25–100)
ALT SERPL-CCNC: 17 U/L (ref 4–36)
ANION GAP SERPL CALCULATED.3IONS-SCNC: 9 MMOL/L (ref 3–16)
AST SERPL-CCNC: 18 U/L (ref 8–33)
BASE EXCESS BLDA CALC-SCNC: 4.3 MMOL/L (ref -3–3)
BASOPHILS # BLD: 0.1 K/UL (ref 0–0.1)
BASOPHILS NFR BLD: 1 %
BILIRUB SERPL-MCNC: 0.6 MG/DL (ref 0.3–1.2)
BUN SERPL-MCNC: 12 MG/DL (ref 6–20)
CALCIUM SERPL-MCNC: 8.8 MG/DL (ref 8.5–10.5)
CHLORIDE SERPL-SCNC: 101 MMOL/L (ref 98–107)
CO2 BLDA-SCNC: 30.3 MMOL/L (ref 24–30)
CO2 SERPL-SCNC: 26 MMOL/L (ref 20–30)
CREAT SERPL-MCNC: 1.3 MG/DL (ref 0.4–1.2)
CRP SERPL-MCNC: 29.9 MG/L (ref 0–5.1)
D DIMER PPP DDU-MCNC: 0.71 UG/ML FEU (ref 0–0.6)
EOSINOPHIL # BLD: 0.6 K/UL (ref 0–0.4)
EOSINOPHIL NFR BLD: 6.6 %
ERYTHROCYTE [DISTWIDTH] IN BLOOD BY AUTOMATED COUNT: 12.9 % (ref 11–16)
FLUAV AG NPH QL: NEGATIVE
FLUBV AG NPH QL: NEGATIVE
GFR SERPLBLD CREATININE-BSD FMLA CKD-EPI: 59 ML/MIN/{1.73_M2}
GLOBULIN SER CALC-MCNC: 2.6 G/DL
GLUCOSE BLD-MCNC: 169 MG/DL (ref 74–106)
GLUCOSE SERPL-MCNC: 127 MG/DL (ref 74–106)
HCO3 BLDA-SCNC: 29 MMOL/L (ref 22–26)
HCT VFR BLD AUTO: 34.7 % (ref 40–54)
HGB BLD-MCNC: 11.7 G/DL (ref 13–18)
IMM GRANULOCYTES # BLD: 0.1 K/UL
IMM GRANULOCYTES NFR BLD: 0.5 % (ref 0–5)
INHALED O2 FLOW RATE: 0.32 %
LACTATE BLDV-SCNC: 1.8 MMOL/L (ref 0.4–1.9)
LYMPHOCYTES # BLD: 1.2 K/UL (ref 1.5–4)
LYMPHOCYTES NFR BLD: 12.8 %
MCH RBC QN AUTO: 30.5 PG (ref 27–32)
MCHC RBC AUTO-ENTMCNC: 33.7 G/DL (ref 31–35)
MCV RBC AUTO: 90.4 FL (ref 80–100)
MONOCYTES # BLD: 0.7 K/UL (ref 0.2–0.8)
MONOCYTES NFR BLD: 7.3 %
NEUTROPHILS # BLD: 6.7 K/UL (ref 2–7.5)
NEUTS SEG NFR BLD: 71.8 %
O2 THERAPY: ABNORMAL
PCO2 BLDA: 43.8 MMHG (ref 35–45)
PERFORMED ON: ABNORMAL
PH BLDA: 7.44 [PH] (ref 7.35–7.45)
PLATELET # BLD AUTO: 200 K/UL (ref 150–400)
PMV BLD AUTO: 10.1 FL (ref 6–10)
PO2 BLDA: 61.4 MMHG (ref 80–100)
POTASSIUM SERPL-SCNC: 3.9 MMOL/L (ref 3.4–5.1)
PROT SERPL-MCNC: 6.5 G/DL (ref 6.4–8.3)
RBC # BLD AUTO: 3.84 M/UL (ref 4.5–6)
S PYO AG THROAT QL: NEGATIVE
SAO2 % BLDA: 91 %
SARS-COV-2 RDRP RESP QL NAA+PROBE: DETECTED
SODIUM SERPL-SCNC: 136 MMOL/L (ref 136–145)
WBC # BLD AUTO: 9.3 K/UL (ref 4–11)

## 2023-12-12 PROCEDURE — 6370000000 HC RX 637 (ALT 250 FOR IP): Performed by: HOSPITALIST

## 2023-12-12 PROCEDURE — 6370000000 HC RX 637 (ALT 250 FOR IP): Performed by: FAMILY MEDICINE

## 2023-12-12 PROCEDURE — 86140 C-REACTIVE PROTEIN: CPT

## 2023-12-12 PROCEDURE — 96365 THER/PROPH/DIAG IV INF INIT: CPT

## 2023-12-12 PROCEDURE — 87040 BLOOD CULTURE FOR BACTERIA: CPT

## 2023-12-12 PROCEDURE — 85379 FIBRIN DEGRADATION QUANT: CPT

## 2023-12-12 PROCEDURE — 2580000003 HC RX 258: Performed by: HOSPITALIST

## 2023-12-12 PROCEDURE — 94761 N-INVAS EAR/PLS OXIMETRY MLT: CPT

## 2023-12-12 PROCEDURE — 96375 TX/PRO/DX INJ NEW DRUG ADDON: CPT

## 2023-12-12 PROCEDURE — 83605 ASSAY OF LACTIC ACID: CPT

## 2023-12-12 PROCEDURE — 6370000000 HC RX 637 (ALT 250 FOR IP): Performed by: INTERNAL MEDICINE

## 2023-12-12 PROCEDURE — 2700000000 HC OXYGEN THERAPY PER DAY

## 2023-12-12 PROCEDURE — 1200000000 HC SEMI PRIVATE

## 2023-12-12 PROCEDURE — 87880 STREP A ASSAY W/OPTIC: CPT

## 2023-12-12 PROCEDURE — 36600 WITHDRAWAL OF ARTERIAL BLOOD: CPT

## 2023-12-12 PROCEDURE — 99285 EMERGENCY DEPT VISIT HI MDM: CPT

## 2023-12-12 PROCEDURE — 85025 COMPLETE CBC W/AUTO DIFF WBC: CPT

## 2023-12-12 PROCEDURE — 87804 INFLUENZA ASSAY W/OPTIC: CPT

## 2023-12-12 PROCEDURE — 6360000002 HC RX W HCPCS: Performed by: INTERNAL MEDICINE

## 2023-12-12 PROCEDURE — 87635 SARS-COV-2 COVID-19 AMP PRB: CPT

## 2023-12-12 PROCEDURE — 80053 COMPREHEN METABOLIC PANEL: CPT

## 2023-12-12 PROCEDURE — 36415 COLL VENOUS BLD VENIPUNCTURE: CPT

## 2023-12-12 PROCEDURE — 6360000002 HC RX W HCPCS: Performed by: HOSPITALIST

## 2023-12-12 PROCEDURE — 71045 X-RAY EXAM CHEST 1 VIEW: CPT

## 2023-12-12 PROCEDURE — 81001 URINALYSIS AUTO W/SCOPE: CPT

## 2023-12-12 PROCEDURE — 82803 BLOOD GASES ANY COMBINATION: CPT

## 2023-12-12 PROCEDURE — 6370000000 HC RX 637 (ALT 250 FOR IP)

## 2023-12-12 PROCEDURE — 96367 TX/PROPH/DG ADDL SEQ IV INF: CPT

## 2023-12-12 RX ORDER — INSULIN LISPRO 100 [IU]/ML
0-4 INJECTION, SOLUTION INTRAVENOUS; SUBCUTANEOUS
Status: DISCONTINUED | OUTPATIENT
Start: 2023-12-13 | End: 2023-12-13

## 2023-12-12 RX ORDER — GABAPENTIN 400 MG/1
800 CAPSULE ORAL 3 TIMES DAILY
Status: DISCONTINUED | OUTPATIENT
Start: 2023-12-12 | End: 2023-12-16 | Stop reason: HOSPADM

## 2023-12-12 RX ORDER — ONDANSETRON 4 MG/1
4 TABLET, ORALLY DISINTEGRATING ORAL EVERY 8 HOURS PRN
Status: DISCONTINUED | OUTPATIENT
Start: 2023-12-12 | End: 2023-12-16 | Stop reason: HOSPADM

## 2023-12-12 RX ORDER — DEXAMETHASONE SODIUM PHOSPHATE 10 MG/ML
10 INJECTION INTRAMUSCULAR; INTRAVENOUS DAILY
Status: DISCONTINUED | OUTPATIENT
Start: 2023-12-13 | End: 2023-12-16 | Stop reason: HOSPADM

## 2023-12-12 RX ORDER — ENOXAPARIN SODIUM 100 MG/ML
40 INJECTION SUBCUTANEOUS 2 TIMES DAILY
Status: DISCONTINUED | OUTPATIENT
Start: 2023-12-12 | End: 2023-12-16 | Stop reason: HOSPADM

## 2023-12-12 RX ORDER — IBUPROFEN 600 MG/1
1 TABLET ORAL PRN
Status: DISCONTINUED | OUTPATIENT
Start: 2023-12-12 | End: 2023-12-16 | Stop reason: HOSPADM

## 2023-12-12 RX ORDER — FINASTERIDE 5 MG/1
5 TABLET, FILM COATED ORAL DAILY
Status: DISCONTINUED | OUTPATIENT
Start: 2023-12-12 | End: 2023-12-16 | Stop reason: HOSPADM

## 2023-12-12 RX ORDER — DEXTROSE MONOHYDRATE 100 MG/ML
INJECTION, SOLUTION INTRAVENOUS CONTINUOUS PRN
Status: DISCONTINUED | OUTPATIENT
Start: 2023-12-12 | End: 2023-12-16 | Stop reason: HOSPADM

## 2023-12-12 RX ORDER — PANTOPRAZOLE SODIUM 40 MG/1
40 TABLET, DELAYED RELEASE ORAL
Status: DISCONTINUED | OUTPATIENT
Start: 2023-12-13 | End: 2023-12-16 | Stop reason: HOSPADM

## 2023-12-12 RX ORDER — FOLIC ACID 1 MG/1
1 TABLET ORAL DAILY
Status: DISCONTINUED | OUTPATIENT
Start: 2023-12-12 | End: 2023-12-16 | Stop reason: HOSPADM

## 2023-12-12 RX ORDER — INSULIN LISPRO 100 [IU]/ML
0-4 INJECTION, SOLUTION INTRAVENOUS; SUBCUTANEOUS NIGHTLY
Status: DISCONTINUED | OUTPATIENT
Start: 2023-12-12 | End: 2023-12-13

## 2023-12-12 RX ORDER — ACETAMINOPHEN 650 MG/1
650 SUPPOSITORY RECTAL EVERY 6 HOURS PRN
Status: DISCONTINUED | OUTPATIENT
Start: 2023-12-12 | End: 2023-12-16 | Stop reason: HOSPADM

## 2023-12-12 RX ORDER — IBUPROFEN 400 MG/1
400 TABLET ORAL ONCE
Status: COMPLETED | OUTPATIENT
Start: 2023-12-12 | End: 2023-12-12

## 2023-12-12 RX ORDER — INSULIN GLARGINE 100 [IU]/ML
40 INJECTION, SOLUTION SUBCUTANEOUS NIGHTLY
Status: DISCONTINUED | OUTPATIENT
Start: 2023-12-12 | End: 2023-12-14

## 2023-12-12 RX ORDER — 0.9 % SODIUM CHLORIDE 0.9 %
500 INTRAVENOUS SOLUTION INTRAVENOUS ONCE
Status: COMPLETED | OUTPATIENT
Start: 2023-12-12 | End: 2023-12-12

## 2023-12-12 RX ORDER — CARVEDILOL 12.5 MG/1
12.5 TABLET ORAL 2 TIMES DAILY
Status: DISCONTINUED | OUTPATIENT
Start: 2023-12-12 | End: 2023-12-16 | Stop reason: HOSPADM

## 2023-12-12 RX ORDER — DEXAMETHASONE SODIUM PHOSPHATE 10 MG/ML
10 INJECTION INTRAMUSCULAR; INTRAVENOUS ONCE
Status: COMPLETED | OUTPATIENT
Start: 2023-12-12 | End: 2023-12-12

## 2023-12-12 RX ORDER — ACETAMINOPHEN 325 MG/1
650 TABLET ORAL EVERY 6 HOURS PRN
Status: DISCONTINUED | OUTPATIENT
Start: 2023-12-12 | End: 2023-12-16 | Stop reason: HOSPADM

## 2023-12-12 RX ORDER — ONDANSETRON 2 MG/ML
4 INJECTION INTRAMUSCULAR; INTRAVENOUS EVERY 6 HOURS PRN
Status: DISCONTINUED | OUTPATIENT
Start: 2023-12-12 | End: 2023-12-16 | Stop reason: HOSPADM

## 2023-12-12 RX ORDER — LISINOPRIL 5 MG/1
5 TABLET ORAL DAILY
Status: DISCONTINUED | OUTPATIENT
Start: 2023-12-12 | End: 2023-12-16

## 2023-12-12 RX ORDER — OXYCODONE AND ACETAMINOPHEN 10; 325 MG/1; MG/1
0.5 TABLET ORAL
Status: DISCONTINUED | OUTPATIENT
Start: 2023-12-12 | End: 2023-12-16 | Stop reason: HOSPADM

## 2023-12-12 RX ORDER — POLYETHYLENE GLYCOL 3350 17 G/17G
17 POWDER, FOR SOLUTION ORAL DAILY PRN
Status: DISCONTINUED | OUTPATIENT
Start: 2023-12-12 | End: 2023-12-16 | Stop reason: HOSPADM

## 2023-12-12 RX ORDER — OXYCODONE AND ACETAMINOPHEN 10; 325 MG/1; MG/1
1 TABLET ORAL EVERY MORNING
Status: DISCONTINUED | OUTPATIENT
Start: 2023-12-13 | End: 2023-12-16 | Stop reason: HOSPADM

## 2023-12-12 RX ORDER — TRAZODONE HYDROCHLORIDE 50 MG/1
50 TABLET ORAL NIGHTLY
Status: DISCONTINUED | OUTPATIENT
Start: 2023-12-12 | End: 2023-12-16 | Stop reason: HOSPADM

## 2023-12-12 RX ORDER — TAMSULOSIN HYDROCHLORIDE 0.4 MG/1
0.4 CAPSULE ORAL DAILY
Status: DISCONTINUED | OUTPATIENT
Start: 2023-12-12 | End: 2023-12-16 | Stop reason: HOSPADM

## 2023-12-12 RX ORDER — LEVOTHYROXINE SODIUM 88 UG/1
88 TABLET ORAL DAILY
Status: DISCONTINUED | OUTPATIENT
Start: 2023-12-12 | End: 2023-12-16 | Stop reason: HOSPADM

## 2023-12-12 RX ORDER — ALOGLIPTIN 12.5 MG/1
12.5 TABLET, FILM COATED ORAL DAILY
Status: DISCONTINUED | OUTPATIENT
Start: 2023-12-12 | End: 2023-12-16 | Stop reason: HOSPADM

## 2023-12-12 RX ORDER — ACETAMINOPHEN 500 MG
1000 TABLET ORAL ONCE
Status: COMPLETED | OUTPATIENT
Start: 2023-12-12 | End: 2023-12-12

## 2023-12-12 RX ORDER — ALBUTEROL SULFATE 90 UG/1
2 AEROSOL, METERED RESPIRATORY (INHALATION) EVERY 4 HOURS PRN
Status: DISCONTINUED | OUTPATIENT
Start: 2023-12-12 | End: 2023-12-16 | Stop reason: HOSPADM

## 2023-12-12 RX ADMIN — CARVEDILOL 12.5 MG: 12.5 TABLET, FILM COATED ORAL at 22:26

## 2023-12-12 RX ADMIN — BARICITINIB 2 MG: 2 TABLET, FILM COATED ORAL at 22:50

## 2023-12-12 RX ADMIN — DEXAMETHASONE SODIUM PHOSPHATE 10 MG: 10 INJECTION INTRAMUSCULAR; INTRAVENOUS at 18:39

## 2023-12-12 RX ADMIN — TRAZODONE HYDROCHLORIDE 50 MG: 50 TABLET ORAL at 22:27

## 2023-12-12 RX ADMIN — AZITHROMYCIN MONOHYDRATE 500 MG: 500 INJECTION, POWDER, LYOPHILIZED, FOR SOLUTION INTRAVENOUS at 18:20

## 2023-12-12 RX ADMIN — ACETAMINOPHEN 1000 MG: 500 TABLET ORAL at 18:05

## 2023-12-12 RX ADMIN — GABAPENTIN 800 MG: 400 CAPSULE ORAL at 22:27

## 2023-12-12 RX ADMIN — SODIUM CHLORIDE 500 ML: 9 INJECTION, SOLUTION INTRAVENOUS at 18:13

## 2023-12-12 RX ADMIN — INSULIN GLARGINE 40 UNITS: 100 INJECTION, SOLUTION SUBCUTANEOUS at 22:27

## 2023-12-12 RX ADMIN — ENOXAPARIN SODIUM 40 MG: 100 INJECTION SUBCUTANEOUS at 22:27

## 2023-12-12 RX ADMIN — CEFTRIAXONE 1000 MG: 1 INJECTION, POWDER, FOR SOLUTION INTRAMUSCULAR; INTRAVENOUS at 19:21

## 2023-12-12 RX ADMIN — OXYCODONE AND ACETAMINOPHEN 0.5 TABLET: 10; 325 TABLET ORAL at 22:26

## 2023-12-12 RX ADMIN — IBUPROFEN 400 MG: 400 TABLET ORAL at 19:29

## 2023-12-12 ASSESSMENT — LIFESTYLE VARIABLES
HOW MANY STANDARD DRINKS CONTAINING ALCOHOL DO YOU HAVE ON A TYPICAL DAY: PATIENT DOES NOT DRINK
HOW OFTEN DO YOU HAVE A DRINK CONTAINING ALCOHOL: NEVER
HOW OFTEN DO YOU HAVE A DRINK CONTAINING ALCOHOL: NEVER

## 2023-12-12 ASSESSMENT — PAIN SCALES - GENERAL: PAINLEVEL_OUTOF10: 6

## 2023-12-12 ASSESSMENT — PAIN - FUNCTIONAL ASSESSMENT: PAIN_FUNCTIONAL_ASSESSMENT: NONE - DENIES PAIN

## 2023-12-12 NOTE — ED TRIAGE NOTES
Registration staff alerted RN that pt was in the lobby to be registered and was presenting with labored breathing. Wife states O2 was 75%. RN did spot check - O2 was 88% while pt was sitting in the wheelchair & no supp oxygen on. Pt states he started with a sore throat yesterday. Pt noted to be dusky in color, labored breathing, and generalized weakness. 3L o2 applied to applied - 93% oxygen sat noted.

## 2023-12-12 NOTE — ED PROVIDER NOTES
[address] ENCOUNTER        Pt Name: Abhi Yanez  MRN: [id number]  [address] [facility] 1955  Date of evaluation: 12/12/2023  Provider: Trish Murcia DO  PCP: TANESHA Fragoso  Note Started: 5:55 PM EST 12/12/23    CHIEF COMPLAINT       Chief Complaint   Patient presents with    Shortness of Breath       SOA since yesterday 12/11         HISTORY OF PRESENT ILLNESS: 1 or more Elements     History from : Patient    Limitations to history : None    Abhi Yanez is a 77 y.o. male who presents to the emergency department with shortness of breath. Patient's chief complaint is that he been short of breath since yesterday 12/11/2023. However the patient himself denies any shortness of breath however when he was brought back from the [facility] and placed in the room his pulse ox was 88% on room air. Patient states he does not use oxygen at home. Patient was actually evaluated here by me back on 10/20/2023 was diagnosed with a pneumonia and acute cystitis at that time. Patient states that his family brought him appear for evaluation but he states anytime he even gets a cold they bring him up for evaluation. He states he has had some sinus pressure. Patient denies any headache at the ordinary. Denies any earache. Denies any runny nose or nasal drainage or discharge. He denies any loss of taste or smell. Denies any cough out of the ordinary he does state he has a sore throat. Denies shortness of breath but again pulse ox was 88% on room air. Denies any chest pain. Denies any nausea vomiting or diarrhea. Denies any abdominal pain. He states he does have pain and discomfort with urination. Feels like he is urinating a little more than usual.  Denies any sick contacts that he is aware of. Denies any fevers or chills however he was febrile here with a temp of 100.9. Advised that he is not vaccinated for COVID or for influenza.   Patient is alert awake and

## 2023-12-13 PROBLEM — U07.1 ACUTE HYPOXEMIC RESPIRATORY FAILURE DUE TO COVID-19 (HCC): Status: RESOLVED | Noted: 2023-12-12 | Resolved: 2023-12-13

## 2023-12-13 PROBLEM — J12.82 PNEUMONIA DUE TO COVID-19 VIRUS: Status: ACTIVE | Noted: 2023-12-13

## 2023-12-13 PROBLEM — J96.21 ACUTE ON CHRONIC RESPIRATORY FAILURE WITH HYPOXIA (HCC): Status: ACTIVE | Noted: 2023-10-20

## 2023-12-13 PROBLEM — J96.01 ACUTE HYPOXEMIC RESPIRATORY FAILURE DUE TO COVID-19 (HCC): Status: RESOLVED | Noted: 2023-12-12 | Resolved: 2023-12-13

## 2023-12-13 PROBLEM — U07.1 PNEUMONIA DUE TO COVID-19 VIRUS: Status: ACTIVE | Noted: 2023-12-13

## 2023-12-13 LAB
ALBUMIN SERPL-MCNC: 3.5 G/DL (ref 3.4–4.8)
ALBUMIN/GLOB SERPL: 1.3 {RATIO} (ref 0.8–2)
ALP SERPL-CCNC: 95 U/L (ref 25–100)
ALT SERPL-CCNC: 15 U/L (ref 4–36)
ANION GAP SERPL CALCULATED.3IONS-SCNC: 9 MMOL/L (ref 3–16)
AST SERPL-CCNC: 15 U/L (ref 8–33)
BACTERIA URNS QL MICRO: ABNORMAL /HPF
BASOPHILS # BLD: 0.1 K/UL (ref 0–0.1)
BASOPHILS NFR BLD: 0.7 %
BILIRUB SERPL-MCNC: 0.4 MG/DL (ref 0.3–1.2)
BILIRUB UR QL STRIP.AUTO: NEGATIVE
BUN SERPL-MCNC: 18 MG/DL (ref 6–20)
CALCIUM SERPL-MCNC: 8.3 MG/DL (ref 8.5–10.5)
CHLORIDE SERPL-SCNC: 100 MMOL/L (ref 98–107)
CLARITY UR: CLEAR
CO2 SERPL-SCNC: 25 MMOL/L (ref 20–30)
COLOR UR: YELLOW
CREAT SERPL-MCNC: 1.4 MG/DL (ref 0.4–1.2)
EOSINOPHIL # BLD: 0 K/UL (ref 0–0.4)
EOSINOPHIL NFR BLD: 0 %
EPI CELLS #/AREA URNS HPF: ABNORMAL /HPF (ref 0–5)
ERYTHROCYTE [DISTWIDTH] IN BLOOD BY AUTOMATED COUNT: 12.9 % (ref 11–16)
GFR SERPLBLD CREATININE-BSD FMLA CKD-EPI: 54 ML/MIN/{1.73_M2}
GLOBULIN SER CALC-MCNC: 2.7 G/DL
GLUCOSE BLD-MCNC: 262 MG/DL (ref 74–106)
GLUCOSE BLD-MCNC: 281 MG/DL (ref 74–106)
GLUCOSE BLD-MCNC: 311 MG/DL (ref 74–106)
GLUCOSE BLD-MCNC: 346 MG/DL (ref 74–106)
GLUCOSE SERPL-MCNC: 253 MG/DL (ref 74–106)
GLUCOSE UR STRIP.AUTO-MCNC: NEGATIVE MG/DL
HCT VFR BLD AUTO: 33.1 % (ref 40–54)
HGB BLD-MCNC: 11 G/DL (ref 13–18)
HGB UR QL STRIP.AUTO: ABNORMAL
IMM GRANULOCYTES # BLD: 0 K/UL
IMM GRANULOCYTES NFR BLD: 0.3 % (ref 0–5)
KETONES UR STRIP.AUTO-MCNC: NEGATIVE MG/DL
LEUKOCYTE ESTERASE UR QL STRIP.AUTO: NEGATIVE
LYMPHOCYTES # BLD: 1.2 K/UL (ref 1.5–4)
LYMPHOCYTES NFR BLD: 17.1 %
MCH RBC QN AUTO: 30.5 PG (ref 27–32)
MCHC RBC AUTO-ENTMCNC: 33.2 G/DL (ref 31–35)
MCV RBC AUTO: 91.7 FL (ref 80–100)
MONOCYTES # BLD: 0.2 K/UL (ref 0.2–0.8)
MONOCYTES NFR BLD: 2.1 %
NEUTROPHILS # BLD: 5.6 K/UL (ref 2–7.5)
NEUTS SEG NFR BLD: 79.8 %
NITRITE UR QL STRIP.AUTO: NEGATIVE
PERFORMED ON: ABNORMAL
PH UR STRIP.AUTO: 5.5 [PH] (ref 5–8)
PLATELET # BLD AUTO: 171 K/UL (ref 150–400)
PMV BLD AUTO: 10.2 FL (ref 6–10)
POTASSIUM SERPL-SCNC: 4.5 MMOL/L (ref 3.4–5.1)
PROCALCITONIN SERPL IA-MCNC: 0.11 NG/ML (ref 0–0.15)
PROT SERPL-MCNC: 6.2 G/DL (ref 6.4–8.3)
PROT UR STRIP.AUTO-MCNC: 100 MG/DL
RBC # BLD AUTO: 3.61 M/UL (ref 4.5–6)
RBC #/AREA URNS HPF: ABNORMAL /HPF (ref 0–4)
SODIUM SERPL-SCNC: 134 MMOL/L (ref 136–145)
SP GR UR STRIP.AUTO: 1.02 (ref 1–1.03)
UA COMPLETE W REFLEX CULTURE PNL UR: ABNORMAL
UA DIPSTICK W REFLEX MICRO PNL UR: YES
URN SPEC COLLECT METH UR: ABNORMAL
UROBILINOGEN UR STRIP-ACNC: 0.2 E.U./DL
WBC # BLD AUTO: 7 K/UL (ref 4–11)
WBC #/AREA URNS HPF: ABNORMAL /HPF (ref 0–5)

## 2023-12-13 PROCEDURE — 2700000000 HC OXYGEN THERAPY PER DAY

## 2023-12-13 PROCEDURE — 97161 PT EVAL LOW COMPLEX 20 MIN: CPT

## 2023-12-13 PROCEDURE — 36415 COLL VENOUS BLD VENIPUNCTURE: CPT

## 2023-12-13 PROCEDURE — 6360000002 HC RX W HCPCS: Performed by: INTERNAL MEDICINE

## 2023-12-13 PROCEDURE — 99222 1ST HOSP IP/OBS MODERATE 55: CPT | Performed by: INTERNAL MEDICINE

## 2023-12-13 PROCEDURE — 84145 PROCALCITONIN (PCT): CPT

## 2023-12-13 PROCEDURE — 97165 OT EVAL LOW COMPLEX 30 MIN: CPT

## 2023-12-13 PROCEDURE — 94761 N-INVAS EAR/PLS OXIMETRY MLT: CPT

## 2023-12-13 PROCEDURE — 80053 COMPREHEN METABOLIC PANEL: CPT

## 2023-12-13 PROCEDURE — 6370000000 HC RX 637 (ALT 250 FOR IP): Performed by: INTERNAL MEDICINE

## 2023-12-13 PROCEDURE — 85025 COMPLETE CBC W/AUTO DIFF WBC: CPT

## 2023-12-13 PROCEDURE — 6370000000 HC RX 637 (ALT 250 FOR IP): Performed by: PHYSICIAN ASSISTANT

## 2023-12-13 PROCEDURE — 6360000002 HC RX W HCPCS: Performed by: PHYSICIAN ASSISTANT

## 2023-12-13 PROCEDURE — 2580000003 HC RX 258: Performed by: PHYSICIAN ASSISTANT

## 2023-12-13 PROCEDURE — 1200000000 HC SEMI PRIVATE

## 2023-12-13 RX ORDER — DOXYCYCLINE 100 MG/1
100 CAPSULE ORAL EVERY 12 HOURS SCHEDULED
Status: DISCONTINUED | OUTPATIENT
Start: 2023-12-13 | End: 2023-12-16 | Stop reason: HOSPADM

## 2023-12-13 RX ORDER — LISINOPRIL 40 MG/1
40 TABLET ORAL DAILY
Status: ON HOLD | COMMUNITY
Start: 2023-10-28 | End: 2023-12-16 | Stop reason: HOSPADM

## 2023-12-13 RX ORDER — INSULIN LISPRO 100 [IU]/ML
0-4 INJECTION, SOLUTION INTRAVENOUS; SUBCUTANEOUS NIGHTLY
Status: DISCONTINUED | OUTPATIENT
Start: 2023-12-13 | End: 2023-12-13

## 2023-12-13 RX ORDER — GUAIFENESIN/DEXTROMETHORPHAN 100-10MG/5
5 SYRUP ORAL EVERY 4 HOURS PRN
Status: DISCONTINUED | OUTPATIENT
Start: 2023-12-13 | End: 2023-12-16 | Stop reason: HOSPADM

## 2023-12-13 RX ORDER — GUAIFENESIN 600 MG/1
600 TABLET, EXTENDED RELEASE ORAL 2 TIMES DAILY
Status: DISCONTINUED | OUTPATIENT
Start: 2023-12-13 | End: 2023-12-16 | Stop reason: HOSPADM

## 2023-12-13 RX ORDER — INSULIN LISPRO 100 [IU]/ML
0-8 INJECTION, SOLUTION INTRAVENOUS; SUBCUTANEOUS
Status: DISCONTINUED | OUTPATIENT
Start: 2023-12-13 | End: 2023-12-13

## 2023-12-13 RX ORDER — INSULIN LISPRO 100 [IU]/ML
0-4 INJECTION, SOLUTION INTRAVENOUS; SUBCUTANEOUS NIGHTLY
Status: DISCONTINUED | OUTPATIENT
Start: 2023-12-13 | End: 2023-12-14

## 2023-12-13 RX ORDER — INSULIN LISPRO 100 [IU]/ML
0-8 INJECTION, SOLUTION INTRAVENOUS; SUBCUTANEOUS
Status: DISCONTINUED | OUTPATIENT
Start: 2023-12-13 | End: 2023-12-14

## 2023-12-13 RX ORDER — HYDRALAZINE HYDROCHLORIDE 25 MG/1
50 TABLET, FILM COATED ORAL EVERY 8 HOURS SCHEDULED
Status: DISCONTINUED | OUTPATIENT
Start: 2023-12-13 | End: 2023-12-14

## 2023-12-13 RX ADMIN — CARVEDILOL 12.5 MG: 12.5 TABLET, FILM COATED ORAL at 21:14

## 2023-12-13 RX ADMIN — CARVEDILOL 12.5 MG: 12.5 TABLET, FILM COATED ORAL at 08:05

## 2023-12-13 RX ADMIN — TRAZODONE HYDROCHLORIDE 50 MG: 50 TABLET ORAL at 21:14

## 2023-12-13 RX ADMIN — DOXYCYCLINE 100 MG: 100 CAPSULE ORAL at 09:52

## 2023-12-13 RX ADMIN — GUAIFENESIN 600 MG: 600 TABLET, EXTENDED RELEASE ORAL at 09:52

## 2023-12-13 RX ADMIN — ENOXAPARIN SODIUM 40 MG: 100 INJECTION SUBCUTANEOUS at 08:09

## 2023-12-13 RX ADMIN — LISINOPRIL 5 MG: 5 TABLET ORAL at 08:05

## 2023-12-13 RX ADMIN — OXYCODONE AND ACETAMINOPHEN 1 TABLET: 10; 325 TABLET ORAL at 08:13

## 2023-12-13 RX ADMIN — GUAIFENESIN 600 MG: 600 TABLET, EXTENDED RELEASE ORAL at 21:34

## 2023-12-13 RX ADMIN — HYDRALAZINE HYDROCHLORIDE 50 MG: 25 TABLET, FILM COATED ORAL at 13:53

## 2023-12-13 RX ADMIN — GABAPENTIN 800 MG: 400 CAPSULE ORAL at 13:20

## 2023-12-13 RX ADMIN — HYDRALAZINE HYDROCHLORIDE 50 MG: 25 TABLET, FILM COATED ORAL at 21:14

## 2023-12-13 RX ADMIN — INSULIN GLARGINE 40 UNITS: 100 INJECTION, SOLUTION SUBCUTANEOUS at 21:26

## 2023-12-13 RX ADMIN — ALOGLIPTIN 12.5 MG: 12.5 TABLET, FILM COATED ORAL at 08:05

## 2023-12-13 RX ADMIN — INSULIN LISPRO 6 UNITS: 100 INJECTION, SOLUTION INTRAVENOUS; SUBCUTANEOUS at 17:55

## 2023-12-13 RX ADMIN — GABAPENTIN 800 MG: 400 CAPSULE ORAL at 21:13

## 2023-12-13 RX ADMIN — DOXYCYCLINE 100 MG: 100 CAPSULE ORAL at 21:34

## 2023-12-13 RX ADMIN — INSULIN LISPRO 2 UNITS: 100 INJECTION, SOLUTION INTRAVENOUS; SUBCUTANEOUS at 08:14

## 2023-12-13 RX ADMIN — OXYCODONE AND ACETAMINOPHEN 0.5 TABLET: 10; 325 TABLET ORAL at 21:14

## 2023-12-13 RX ADMIN — GABAPENTIN 800 MG: 400 CAPSULE ORAL at 08:04

## 2023-12-13 RX ADMIN — LEVOTHYROXINE SODIUM 88 MCG: 0.09 TABLET ORAL at 08:13

## 2023-12-13 RX ADMIN — DEXAMETHASONE SODIUM PHOSPHATE 10 MG: 10 INJECTION INTRAMUSCULAR; INTRAVENOUS at 08:04

## 2023-12-13 RX ADMIN — FOLIC ACID 1 MG: 1 TABLET ORAL at 08:05

## 2023-12-13 RX ADMIN — ENOXAPARIN SODIUM 40 MG: 100 INJECTION SUBCUTANEOUS at 21:16

## 2023-12-13 RX ADMIN — FINASTERIDE 5 MG: 5 TABLET, FILM COATED ORAL at 08:05

## 2023-12-13 RX ADMIN — ALBUTEROL SULFATE 2 PUFF: 90 AEROSOL, METERED RESPIRATORY (INHALATION) at 10:55

## 2023-12-13 RX ADMIN — TAMSULOSIN HYDROCHLORIDE 0.4 MG: 0.4 CAPSULE ORAL at 08:05

## 2023-12-13 RX ADMIN — INSULIN LISPRO 2 UNITS: 100 INJECTION, SOLUTION INTRAVENOUS; SUBCUTANEOUS at 11:42

## 2023-12-13 RX ADMIN — PANTOPRAZOLE SODIUM 40 MG: 40 TABLET, DELAYED RELEASE ORAL at 05:43

## 2023-12-13 RX ADMIN — BARICITINIB 2 MG: 2 TABLET, FILM COATED ORAL at 08:06

## 2023-12-13 RX ADMIN — INSULIN LISPRO 4 UNITS: 100 INJECTION, SOLUTION INTRAVENOUS; SUBCUTANEOUS at 21:25

## 2023-12-13 RX ADMIN — CEFTRIAXONE 1000 MG: 1 INJECTION, POWDER, FOR SOLUTION INTRAMUSCULAR; INTRAVENOUS at 21:24

## 2023-12-13 ASSESSMENT — SOCIAL DETERMINANTS OF HEALTH (SDOH)
HOW OFTEN DO YOU GET TOGETHER WITH FRIENDS OR RELATIVES?: MORE THAN THREE TIMES A WEEK
WITHIN THE LAST YEAR, HAVE YOU BEEN AFRAID OF YOUR PARTNER OR EX-PARTNER?: NO
HOW OFTEN DO YOU ATTENT MEETINGS OF THE CLUB OR ORGANIZATION YOU BELONG TO?: NEVER
WITHIN THE LAST YEAR, HAVE YOU BEEN KICKED, HIT, SLAPPED, OR OTHERWISE PHYSICALLY HURT BY YOUR PARTNER OR EX-PARTNER?: NO
HOW OFTEN DO YOU ATTEND CHURCH OR RELIGIOUS SERVICES?: MORE THAN 4 TIMES PER YEAR
DO YOU BELONG TO ANY CLUBS OR ORGANIZATIONS SUCH AS CHURCH GROUPS UNIONS, FRATERNAL OR ATHLETIC GROUPS, OR SCHOOL GROUPS?: NO
IN A TYPICAL WEEK, HOW MANY TIMES DO YOU TALK ON THE PHONE WITH FAMILY, FRIENDS, OR NEIGHBORS?: MORE THAN THREE TIMES A WEEK
WITHIN THE LAST YEAR, HAVE TO BEEN RAPED OR FORCED TO HAVE ANY KIND OF SEXUAL ACTIVITY BY YOUR PARTNER OR EX-PARTNER?: NO
WITHIN THE LAST YEAR, HAVE YOU BEEN HUMILIATED OR EMOTIONALLY ABUSED IN OTHER WAYS BY YOUR PARTNER OR EX-PARTNER?: NO

## 2023-12-13 NOTE — PROGRESS NOTES
Received telephone call from pharmacy regarding olumiant 2mg daily po, per pharmacy, need a crp or d-dimer and to continue decadon  daily, message sent to on call provider, received orders for D-Dimer and CRP to be drawn and Decadron 10mg iv daily, order entered

## 2023-12-13 NOTE — PROGRESS NOTES
Medication Reconciliation completed with fill history from [facility], [address] [facility], and [address] [place] [address] [place]. I attempted to contact patient's wife but was unable to reach her. I called into patient's room and he was able to confirm medication. -patient is taking oxycodone/APAP 10/325 mg 1 to 1&1/2 tablet per day for pain  -changed Trazodone 100 mg from qhs TO 2 tablets qhs  -changed Metolazone 2.5 mg prn TO every other day.  -changed Lisinopril 5 mg qd TO 40 mg qd  -added Sertraline 50 mg qd    -patient received several medications from [facility]  on 10/23/2023 and assumed he was only supposed to take them for the 30 days and has not refilled them. They include: Ferrous Sulfate 313 qam wc, Folic Acid 1 mg qd, Vitamin B 12 2500 mcg sublingual qd, Finasteride 5 mg qd, and Tamsulosin 0.4 mg qd.

## 2023-12-13 NOTE — PROGRESS NOTES
Occupational Therapy  Facility/Department: [facility]  Occupational Therapy Initial Assessment    Name: Anne Ames  : 1955  MRN: [id number]  Date of Service: 2023    Discharge Recommendations:  Home with assist PRN  OT Equipment Recommendations  Equipment Needed: No       Patient Diagnosis(es): The primary encounter diagnosis was Acute respiratory failure with hypoxia ([address]). A diagnosis of COVID-19 was also pertinent to this visit. Past Medical History:  has a past medical history of Chronic back pain, Chronic kidney disease, DDD (degenerative disc disease), lumbar, GERD (gastroesophageal reflux disease), Hypercholesteremia, Hypertension, Left lumbar radiculopathy, Obesity, Type II or unspecified type diabetes mellitus without mention of complication, not stated as uncontrolled, and Unspecified sleep apnea. Past Surgical History:  has no past surgical history on file. Assessment   Performance deficits / Impairments: Decreased ADL status; Decreased endurance  Assessment: This 77year old male was referred to OT services upon admission following onset of COVID. Pt presents on . Pt is pleasant and cooperative, NAD. Pt is alert and oriented x3. Pt transferred to sitting at EOB with MOD I. Pt sat EOB without support. Pt AROM and MMT WFL. Pt slipped shoes on with CHAN however, states he is unable to don socks at baseline. Pt states he uses a back scratcher to don pants at home. Discussed benefit of AE with pt to improve independence with dressing tasks. Pt agreeable to education. Pt come to stand with SBA. Pt ambulated in room with SBA no AD. Pt stable with no LOB. Pt ambulated ~100 feet with no SOA on . Pt tolerated well. Pt ambulated to bathroom and completed toileting transfer with MOD I. Pt declined need to toilet  at this time but has been using urinal unassisted at bedside since admission. Pt returned to seated position at bedside with call light in reach.  Pt will benefit from skilled OT services to address LB dressing deficits. Prognosis: Good  Decision Making: Low Complexity  REQUIRES OT FOLLOW-UP: Yes  Activity Tolerance  Activity Tolerance: Patient Tolerated treatment well        Plan   Occupational Therapy Plan  Times Per Week: 3-5  Times Per Day: Once a day  Days Per Week: 5 Days  Therapy Duration: 4-7 Days  Current Treatment Recommendations: Self-Care / ADL, Endurance training     Restrictions  Restrictions/Precautions  Restrictions/Precautions: General Precautions, Isolation  Required Braces or Orthoses?: No    Subjective   General  Chart Reviewed: Yes  Patient assessed for rehabilitation services?: Yes  Family / Caregiver Present: No  Referring Practitioner: Jacinto Driver PA-C  Diagnosis: COVID  Subjective  Subjective: Pt tested positive for COVID on 12/12. Pt began having symptoms yesterday evening.      Social/Functional History  Social/Functional History  Lives With: Spouse, Family  Type of Home: House  Home Layout: One level  Home Access: Stairs to enter without rails  Entrance Stairs - Number of Steps: Small step to enter  Bathroom Shower/Tub: Tub/Shower unit, Walk-in shower  Bathroom Toilet: Standard  Bathroom Equipment: Grab bars in shower, Grab bars around toilet, 3-in-1 commode  Bathroom Accessibility: Walker accessible  Home Equipment: Hedrick Damico, rolling  Has the patient had two or more falls in the past year or any fall with injury in the past year?: Yes  Receives Help From: Family  ADL Assistance: Needs assistance (Pt able to bath, toilet and dress self, spouse requires assistance with donning socks.)  Homemaking Responsibilities: No  Ambulation Assistance: Independent (Uses AD cane/walker in house for household distance, doesn't use any AD for community mobility.)  Transfer Assistance: Independent  Active : Yes       Objective   Pulse: 71  Heart Rate Source: Monitor  BP: (!) 168/90  BP Location: Left upper arm  BP Method: Manual  MAP (Calculated):

## 2023-12-13 NOTE — PROGRESS NOTES
Physical Therapy  Facility/Department: [facility]  Physical Therapy Initial Assessment/Eval Only     Name: Bard Lomeli  : 1955  MRN: [id number]  Date of Service: 2023    Discharge Recommendations:  Home with assist PRN          Patient Diagnosis(es): The primary encounter diagnosis was Acute respiratory failure with hypoxia ([address] A diagnosis of COVID-19 was also pertinent to this visit. Past Medical History:  has a past medical history of Chronic back pain, Chronic kidney disease, DDD (degenerative disc disease), lumbar, GERD (gastroesophageal reflux disease), Hypercholesteremia, Hypertension, Left lumbar radiculopathy, Obesity, Type II or unspecified type diabetes mellitus without mention of complication, not stated as uncontrolled, and Unspecified sleep apnea. Past Surgical History:  has no past surgical history on file. Assessment   Assessment: Pt is a 78 y/o male that was referred to PT services upon admission following onset of COVID. Pt presents on 4L 02. Pt is pleasant and cooperative. Pt is alert and oriented x3. Pt able to perform BM with Mod I, TF's with MOd I. 5/5 BLE strength with MMT and AROM WFL. Pt tolerated seated pertubations without LOB. and was able to ambulate in room SBA. Pt stable with no LOB. Pt ambulated ~100 feet with no SOA on 4L 02. Pt tolerated well. Pt returned to seated position at bedside with call light in reach. Pt seems to be at baseline and will not benefit from PT services at this time. Decision Making: Low Complexity  No Skilled PT:  At baseline function  Requires PT Follow-Up: No  Activity Tolerance  Activity Tolerance: Patient tolerated evaluation without incident;Patient tolerated treatment well     Plan   Physical Therapy Plan  General Plan: Discharge with evaluation only     Restrictions  Restrictions/Precautions  Restrictions/Precautions: General Precautions, Isolation  Required Braces or Orthoses?: No     Subjective   Pain: \"no more than

## 2023-12-13 NOTE — PROGRESS NOTES
4 Eyes Skin Assessment     NAME:  Keesha House  YOB: 1955  MEDICAL RECORD NUMBER:  [id number]    The patient is being assessed for  Admission    I agree that at least one RN has performed a thorough Head to Toe Skin Assessment on the patient. ALL assessment sites listed below have been assessed. Areas assessed by both nurses:    Head, Face, Ears, Shoulders, Back, Chest, Arms, Elbows, Hands, Sacrum. Buttock, Coccyx, Ischium, and Legs. Feet and Heels        Does the Patient have a Wound?  No noted wound(s)       Kristian Prevention initiated by RN: Yes  Wound Care Orders initiated by RN: No    Pressure Injury (Stage 3,4, Unstageable, DTI, NWPT, and Complex wounds) if present, place Wound referral order by RN under : No    New Ostomies, if present place, Ostomy referral order under : No     Nurse 1 eSignature: Electronically signed by Mallory Alcala RN on 12/13/23 at 5:23 AM EST    **SHARE this note so that the co-signing nurse can place an eSignature**    Nurse 2 eSignature: {Esignature:[id number]}

## 2023-12-13 NOTE — DISCHARGE INSTRUCTIONS
Monitor blood pressure twice daily at home and keep log. Bring to appointment Wednesday to see Letitia Douglas at Dr Kristina Chin. Call Wednesday morning and she will fit you in. [address]  (Call 211 if need more resources.)       [employer] (formerly known as [employer] [place]): Benefit Hotline: [phone]   Helpful Information: Option for your language, then Option 2 for a new application    [employer] of [address] [place] [address]., [place]; [phone] (Emergency # only, call if food emergency other than  date)  What they offer: Serve [facility], & [facility]. Every Tuesday 11a-2p is pick-up day. No ID or application necessary. [facility]:  [address]; [phone] or [address] [place] [address]; [phone]  What they offer: Open Thursday & Friday, 10am-12 p.m. You can go once per month. Must be a resident of [facility]. You will need your Photo ID/Drivers License and a current piece of mail with your name and address on it. You will also  need the Names and date of birth of everyone who lives in the home. [employer]  [address] [place] [address].[facility]; [phone]   What they offer: Assistance with food, clothing, and furniture. [address] [place] [address]; [phone]      [facility] [facility]:   [address]  [address] [place], [place] (behind [facility])Yoav; [phone]  Helpful Information: Bring ID or piece of mail with name and address on it. Open Monday-Friday 8am-4:30pm.   Kajal Christine [address] [place]; [phone]  What they offer: Food, utility assistance.  Open Tue, Wed, Thurs 10-2      FO[facility]IST[facility]:  [facility]  115

## 2023-12-13 NOTE — NURSE NAVIGATOR
Report given to Arabella Redding RN. Pt will be admitted to Dr. Medina Santana to room #6 on medical unit.

## 2023-12-13 NOTE — PROGRESS NOTES
Pharmacy Consult      Bard Lomeli is a 77 y.o. male for whom pharmacy has been consulted to dose baricitinib. Patient Active Problem List   Diagnosis    DDD (degenerative disc disease), lumbar    Chronic back pain    GERD (gastroesophageal reflux disease)    Hypercholesteremia    Hypertension    Left lumbar radiculopathy    Morbid obesity ([address])    Sleep apnea    Type 2 diabetes mellitus with diabetic neuropathy, with long-term current use of insulin (HCC)    Hypothyroidism    Chronic kidney disease, stage III (moderate) (HCC)    Fever    Leukocytosis    Altered mental status    Community acquired pneumonia of left lower lobe of lung    Acute kidney injury superimposed on CKD ([address])    Sepsis with acute hypoxic respiratory failure ([address])    Anemia    Acute hypoxemic respiratory failure due to COVID-19 [facility])       Allergies:  Atenolol and Meloxicam     Ht/Wt:   Ht Readings from Last 1 Encounters:   12/12/23 1.829 m (6')        Wt Readings from Last 1 Encounters:   12/12/23 (!) 164.4 kg (362 lb 8 oz)         Does the patient meet all of the criteria for receiving baricitinib (see below)? Yes. Has a daily CMP (or LFTs) and CBC with auto differential been ordered? No.  If either are not ordered, the pharmacist should enter CMP and CBC with auto differential daily (per pharmacy practice) under the physician authorizing baricitinib. Recent Labs     12/12/23  1750   LABGLOM 59*   LYMPHSABS 1.2*   NEUTROABS 6.7   ALT 17   AST 18           Assessment/Plan:    Start patient on baricitinib 2  mg daily for 14 days of total treatment or until hospital discharge, whichever is first. Pharmacy will continue to follow GFR, ALC, ANC and aminotransferases. Thank you for the consult.      Electronically signed by Candida Scott, [address] [place] [address] on 12/12/2023 at 10:34 PM

## 2023-12-13 NOTE — CARE COORDINATION
Case Management Assessment  Initial Evaluation    Date/Time of Evaluation: 12/13/2023 11:50 AM  Assessment Completed by: Elisa Harmon RN    If patient is discharged prior to next notation, then this note serves as note for discharge by case management. Patient Name: Moses Chen                   YOB: 1955  Diagnosis: Acute respiratory failure with hypoxia ([address]) [J96.01]  COVID-19 [U07.1]  Acute hypoxemic respiratory failure due to COVID-19 [facility]) [U07.1, J96.01]                   Date / Time: 12/12/2023  5:47 PM    Patient Admission Status: Inpatient   Readmission Risk (Low < 19, Mod (19-27), High > 27): Readmission Risk Score: 18.1    Current PCP: TANESHA Jamison  PCP verified by CM? Yes    Chart Reviewed: Yes      History Provided by: Medical Record  Patient Orientation: Alert and Oriented    Patient Cognition: Alert    Hospitalization in the last 30 days (Readmission):  No    If yes, Readmission Assessment in  Navigator will be completed. Advance Directives:      Code Status: Full Code   Patient's Primary Decision Maker is: Legal Next of Kin    Primary Decision Maker: Winnie Perez - Spouse - [phone]    Secondary Decision Maker: Haig Osgood Child - [phone]    Secondary Decision Maker: Demetrius Nuñez - Child - [phone]    Discharge Planning:    Patient lives with: Spouse/Significant Other Type of Home: House  Primary Care Giver: Self  Patient Support Systems include: Spouse/Significant Other   Current Financial resources: Medicare  Current community resources:  none  Current services prior to admission: Durable Medical Equipment            Current DME: Walker, Cpap, Bedside Commode, Shower Chair, Glucometer, Cane (RW)            Type of Home Care services:  None    ADLS  Prior functional level: Independent in ADLs/IADLs  Current functional level:  (per therapy eval)    Family can provide assistance at DC:  Yes  Would you like Case Management to discuss the discharge plan with any other family members/significant others, and if so, who? No  Plans to Return to Present Housing: Yes  Other Identified Issues/Barriers to RETURNING to current housing: none  Potential Assistance needed at discharge: [address] [facility]            Potential DME:  (per therapy rec)  Patient expects to discharge to: [id number] [place] [address] for transportation at discharge:  [address]: Inocencio Mari / Plan: Greg Blas / Product Type: *No Product type* /     Does insurance require precert for SNF: Yes    Potential assistance Purchasing Medications: Yes  Meds-to-Beds request: Yes      [facility] - [address] [place] [address] [phone] - F [phone]  [address]  [address] [place] [address]  Phone: [phone] Fax: [phone], [address] [phone]  [address] [address]  Phone: [phone] Fax: [phone]      Notes:    Factors facilitating achievement of predicted outcomes: Family support, Motivated, Cooperative, Pleasant, and Has needed Durable Medical Equipment at home    Barriers to discharge: Medical complications    Additional Case Management Notes:  Lives with family. Has Cpap, RW, BSC, SC. I at baseline. Needs home O2 eval at DC. DME per therapy recs.         The Plan for Transition of Care is related to the following treatment goals of Acute respiratory failure with hypoxia ([address]) [J96.01]  COVID-19 [U07.1]  Acute hypoxemic respiratory failure due to COVID-19 ([address]) [U07.1, J96.01]

## 2023-12-13 NOTE — PLAN OF CARE
Problem: Discharge Planning  Goal: Discharge to home or other facility with appropriate resources  Outcome: Progressing  Flowsheets  Taken 12/13/2023 0039  Discharge to home or other facility with appropriate resources:   Identify barriers to discharge with patient and caregiver   Refer to discharge planning if patient needs post-hospital services based on physician order or complex needs related to functional status, cognitive ability or social support system  Taken 12/12/2023 2138  Discharge to home or other facility with appropriate resources:   Identify barriers to discharge with patient and caregiver   Identify discharge learning needs (meds, wound care, etc)   Arrange for interpreters to assist at discharge as needed   Refer to discharge planning if patient needs post-hospital services based on physician order or complex needs related to functional status, cognitive ability or social support system     Problem: Safety - Adult  Goal: Free from fall injury  Outcome: Progressing     Problem: Chronic Conditions and Co-morbidities  Goal: Patient's chronic conditions and co-morbidity symptoms are monitored and maintained or improved  Outcome: Progressing     Problem: Respiratory - Adult  Goal: Achieves optimal ventilation and oxygenation  Outcome: Progressing     Problem: Infection - Adult  Goal: Absence of infection at discharge  Outcome: Progressing     Problem: Infection - Adult  Goal: Absence of infection during hospitalization  Outcome: Progressing     Problem: Metabolic/Fluid and Electrolytes - Adult  Goal: Electrolytes maintained within normal limits  Outcome: Progressing

## 2023-12-13 NOTE — FLOWSHEET NOTE
12/13/23 0039   Assessment   Charting Type Admission   Psychosocial   Psychosocial (WDL) WDL   Neurological   Neuro (WDL) WDL   Level of Consciousness 0   [place] Coma Scale   Eye Opening 4   Best Verbal Response 5   Best Motor Response 6   [place]se Coma Scale Score 15   NIHSS Stroke Scale   NIHSS Stroke Scale Assessed No   HEENT (Head, Ears, Eyes, Nose, & Throat)   HEENT (WDL) X   Right Eye Glasses   Left Eye Glasses   Respiratory   Respiratory (WDL) X   Respiratory Pattern Regular   Respiratory Depth Shallow   Respiratory Quality/Effort Unlabored   Chest Assessment Chest expansion symmetrical   L Breath Sounds   (course)   R Breath Sounds   (course)   Level of Activity/Mobility 1   Breath Sounds   Breath Sounds Bilateral   (course)   Right Upper Lobe   (harsh and diminished)   Right Middle Lobe   (wheezes and diminished)   Right Lower Lobe Diminished   Left Upper Lobe Wheezing   Left Lower Lobe Diminished   Cardiac   Cardiac (WDL) WDL   Gastrointestinal   Abdominal (WDL) X   Abdomen Inspection Rounded; Soft   Last BM (including prior to admit) 12/12/23   RUQ Bowel Sounds Active   LUQ Bowel Sounds Active   RLQ Bowel Sounds Active   LLQ Bowel Sounds Active   Genitourinary   Genitourinary (WDL) WDL   Peripheral Vascular   Peripheral Vascular (WDL) WDL   Edema None   Skin Integumentary    Skin Integumentary (WDL) WDL   Musculoskeletal   Musculoskeletal (WDL) X   RL Extremity Weakness   LL Extremity Weakness   Care Plan - Discharge Planning Goals   Discharge to home or other facility with appropriate resources Identify barriers to discharge with patient and caregiver;Refer to discharge planning if patient needs post-hospital services based on physician order or complex needs related to functional status, cognitive ability or social support system

## 2023-12-13 NOTE — CARE COORDINATION
Resources for food and financial added to DC AVS.       12/13/23 [address] [place] [address]   Within the past 12 months, you worried that your food would run out before you got the money to buy more. Sometimes   Within the past 12 months, the food you bought just didn't last and you didn't have money to get more. Sometimes   Transportation Needs   In the past 12 months, has lack of transportation kept you from medical appointments or from getting medications? no   In the past 12 months, has lack of transportation kept you from meetings, work, or from getting things needed for daily living? No   Safety   How often does anyone, including family and friends, physically hurt you? 1   Housing Stability   In the last 12 months, was there a time when you were not able to pay the mortgage or rent on time? N   In the last 12 months, how many places have you lived? 1   In the last 12 months, was there a time when you did not have a steady place to sleep or slept in a shelter (including now)? N   Utilities   In the past 12 months has the electric, gas, oil, or water company threatened to shut off services in your home? No   Requested Resources   Would you like resources for any of these topics?  Food;Financial

## 2023-12-13 NOTE — PLAN OF CARE
Problem: Discharge Planning  Goal: Discharge to home or other facility with appropriate resources  Outcome: Progressing     Problem: Safety - Adult  Goal: Free from fall injury  Outcome: Progressing     Problem: Chronic Conditions and Co-morbidities  Goal: Patient's chronic conditions and co-morbidity symptoms are monitored and maintained or improved  Outcome: Progressing     Problem: Respiratory - Adult  Goal: Achieves optimal ventilation and oxygenation  Outcome: Progressing     Problem: Infection - Adult  Goal: Absence of infection at discharge  Outcome: Progressing  Goal: Absence of infection during hospitalization  Outcome: Progressing     Problem: Metabolic/Fluid and Electrolytes - Adult  Goal: Electrolytes maintained within normal limits  Outcome: Progressing

## 2023-12-13 NOTE — H&P
History and Physical    Patient:  Nicolas Waldrpo    CHIEF COMPLAINT:    Generalized weakness  Dysnea    HISTORY OF PRESENT ILLNESS:   The patient is a 77 y.o. morbidly obese male with PMH of chronic back pain, CKD stage III, DDD, GERD, HLD, HTN, obesity, diabetes mellitus type 2 and EDWIN who presented to the ER with complaints of generalized weakness and dyspnea. Pt reports being in his usual state of health on Monday. He awoke feeling ill on Tuesday morning. He proceeded to take his wife to a doctor's appointment then went to [facility]. Pt states after finishing shopping in [facility], he wasn't able to get back in his truck due to generalized weakness and dyspnea. Finally with the assistance of his wife, he got into the truck and went home. He also reports dry cough. He denied any fever, chills, chest pain, nausea, vomiting or diarrhea. He continued to feel poorly and decided to come to the ER for evaluation. At baseline, he does not use oxygen. Upon arrival to the ER, O2 sat found to be 88% on room air. He was also found to have temperature of 100.9. He reports receiving all of his COVID vaccines but not flu. Vitals upon arrival: Blood pressure 161/98, heart rate 90, respiratory rate 22, temperature 100.9, O2 sat 93% on 3 L nasal cannula. Labs: WBC 9.3, hemoglobin 11.7, hematocrit 34.7, platelets 022, sodium 136, potassium 3.9, chloride 101, CO2 26, BUN 12, creatinine 1.3, anion gap 9, lactic acid 1.8, glucose 127, CRP 29.9, alkaline phos 103, ALT 17, AST 18, bilirubin 0.6, D-dimer 0.71. Negative strep, negative flu. COVID-19 detected. UA unremarkable. ABG: pH 7.438, pCO2 43.8, pO2 61.4, O2 sat 91.0. Chest x-ray with patchy right basilar opacity may reflect pneumonia. EKG with sinus rhythm, heart rate 92 bpm.  No acute ST-T wave changes. Patient was given IV fluids, Rocephin, azithromycin, Tylenol and Decadron while in the ER. He was then admitted for further care.         Past Medical History:

## 2023-12-14 LAB
ALBUMIN SERPL-MCNC: 3.6 G/DL (ref 3.4–4.8)
ALBUMIN/GLOB SERPL: 1.4 {RATIO} (ref 0.8–2)
ALP SERPL-CCNC: 86 U/L (ref 25–100)
ALT SERPL-CCNC: 14 U/L (ref 4–36)
ANION GAP SERPL CALCULATED.3IONS-SCNC: 7 MMOL/L (ref 3–16)
AST SERPL-CCNC: 17 U/L (ref 8–33)
BASOPHILS # BLD: 0 K/UL (ref 0–0.1)
BASOPHILS NFR BLD: 0.2 %
BILIRUB SERPL-MCNC: 0.3 MG/DL (ref 0.3–1.2)
BUN SERPL-MCNC: 26 MG/DL (ref 6–20)
CALCIUM SERPL-MCNC: 8.6 MG/DL (ref 8.5–10.5)
CHLORIDE SERPL-SCNC: 101 MMOL/L (ref 98–107)
CO2 SERPL-SCNC: 28 MMOL/L (ref 20–30)
CREAT SERPL-MCNC: 1.3 MG/DL (ref 0.4–1.2)
EOSINOPHIL # BLD: 0 K/UL (ref 0–0.4)
EOSINOPHIL NFR BLD: 0 %
ERYTHROCYTE [DISTWIDTH] IN BLOOD BY AUTOMATED COUNT: 12.8 % (ref 11–16)
GFR SERPLBLD CREATININE-BSD FMLA CKD-EPI: 59 ML/MIN/{1.73_M2}
GLOBULIN SER CALC-MCNC: 2.6 G/DL
GLUCOSE BLD-MCNC: 201 MG/DL (ref 74–106)
GLUCOSE BLD-MCNC: 213 MG/DL (ref 74–106)
GLUCOSE BLD-MCNC: 274 MG/DL (ref 74–106)
GLUCOSE BLD-MCNC: 310 MG/DL (ref 74–106)
GLUCOSE SERPL-MCNC: 208 MG/DL (ref 74–106)
HCT VFR BLD AUTO: 33.8 % (ref 40–54)
HGB BLD-MCNC: 11.3 G/DL (ref 13–18)
IMM GRANULOCYTES # BLD: 0.1 K/UL
IMM GRANULOCYTES NFR BLD: 0.4 % (ref 0–5)
LYMPHOCYTES # BLD: 1.8 K/UL (ref 1.5–4)
LYMPHOCYTES NFR BLD: 15.3 %
MCH RBC QN AUTO: 30.6 PG (ref 27–32)
MCHC RBC AUTO-ENTMCNC: 33.4 G/DL (ref 31–35)
MCV RBC AUTO: 91.6 FL (ref 80–100)
MONOCYTES # BLD: 0.8 K/UL (ref 0.2–0.8)
MONOCYTES NFR BLD: 6.6 %
NEUTROPHILS # BLD: 9.3 K/UL (ref 2–7.5)
NEUTS SEG NFR BLD: 77.5 %
PERFORMED ON: ABNORMAL
PLATELET # BLD AUTO: 191 K/UL (ref 150–400)
PMV BLD AUTO: 10.3 FL (ref 6–10)
POTASSIUM SERPL-SCNC: 4.6 MMOL/L (ref 3.4–5.1)
PROT SERPL-MCNC: 6.2 G/DL (ref 6.4–8.3)
RBC # BLD AUTO: 3.69 M/UL (ref 4.5–6)
SODIUM SERPL-SCNC: 136 MMOL/L (ref 136–145)
WBC # BLD AUTO: 12 K/UL (ref 4–11)

## 2023-12-14 PROCEDURE — 36415 COLL VENOUS BLD VENIPUNCTURE: CPT

## 2023-12-14 PROCEDURE — 6370000000 HC RX 637 (ALT 250 FOR IP): Performed by: PHYSICIAN ASSISTANT

## 2023-12-14 PROCEDURE — 85025 COMPLETE CBC W/AUTO DIFF WBC: CPT

## 2023-12-14 PROCEDURE — 6360000002 HC RX W HCPCS: Performed by: PHYSICIAN ASSISTANT

## 2023-12-14 PROCEDURE — 94761 N-INVAS EAR/PLS OXIMETRY MLT: CPT

## 2023-12-14 PROCEDURE — 1200000000 HC SEMI PRIVATE

## 2023-12-14 PROCEDURE — 51798 US URINE CAPACITY MEASURE: CPT

## 2023-12-14 PROCEDURE — 99232 SBSQ HOSP IP/OBS MODERATE 35: CPT | Performed by: INTERNAL MEDICINE

## 2023-12-14 PROCEDURE — 6370000000 HC RX 637 (ALT 250 FOR IP): Performed by: INTERNAL MEDICINE

## 2023-12-14 PROCEDURE — 2700000000 HC OXYGEN THERAPY PER DAY

## 2023-12-14 PROCEDURE — 80053 COMPREHEN METABOLIC PANEL: CPT

## 2023-12-14 PROCEDURE — 97535 SELF CARE MNGMENT TRAINING: CPT

## 2023-12-14 PROCEDURE — 2580000003 HC RX 258: Performed by: PHYSICIAN ASSISTANT

## 2023-12-14 PROCEDURE — 6360000002 HC RX W HCPCS: Performed by: INTERNAL MEDICINE

## 2023-12-14 RX ORDER — LANOLIN ALCOHOL/MO/W.PET/CERES
400 CREAM (GRAM) TOPICAL DAILY
Status: DISCONTINUED | OUTPATIENT
Start: 2023-12-14 | End: 2023-12-16 | Stop reason: HOSPADM

## 2023-12-14 RX ORDER — INSULIN GLARGINE 100 [IU]/ML
45 INJECTION, SOLUTION SUBCUTANEOUS NIGHTLY
Status: DISCONTINUED | OUTPATIENT
Start: 2023-12-14 | End: 2023-12-16 | Stop reason: HOSPADM

## 2023-12-14 RX ORDER — ASCORBIC ACID 500 MG
500 TABLET ORAL DAILY
Status: DISCONTINUED | OUTPATIENT
Start: 2023-12-14 | End: 2023-12-16 | Stop reason: HOSPADM

## 2023-12-14 RX ORDER — INSULIN LISPRO 100 [IU]/ML
0-4 INJECTION, SOLUTION INTRAVENOUS; SUBCUTANEOUS NIGHTLY
Status: DISCONTINUED | OUTPATIENT
Start: 2023-12-14 | End: 2023-12-16 | Stop reason: HOSPADM

## 2023-12-14 RX ORDER — ZINC SULFATE 50(220)MG
50 CAPSULE ORAL DAILY
Status: DISCONTINUED | OUTPATIENT
Start: 2023-12-14 | End: 2023-12-16 | Stop reason: HOSPADM

## 2023-12-14 RX ORDER — INSULIN LISPRO 100 [IU]/ML
0-16 INJECTION, SOLUTION INTRAVENOUS; SUBCUTANEOUS
Status: DISCONTINUED | OUTPATIENT
Start: 2023-12-14 | End: 2023-12-16 | Stop reason: HOSPADM

## 2023-12-14 RX ORDER — HYDRALAZINE HYDROCHLORIDE 25 MG/1
100 TABLET, FILM COATED ORAL EVERY 8 HOURS SCHEDULED
Status: DISCONTINUED | OUTPATIENT
Start: 2023-12-14 | End: 2023-12-16 | Stop reason: HOSPADM

## 2023-12-14 RX ADMIN — ENOXAPARIN SODIUM 40 MG: 100 INJECTION SUBCUTANEOUS at 08:33

## 2023-12-14 RX ADMIN — Medication 400 MG: at 08:42

## 2023-12-14 RX ADMIN — INSULIN GLARGINE 45 UNITS: 100 INJECTION, SOLUTION SUBCUTANEOUS at 21:02

## 2023-12-14 RX ADMIN — TAMSULOSIN HYDROCHLORIDE 0.4 MG: 0.4 CAPSULE ORAL at 08:28

## 2023-12-14 RX ADMIN — PANTOPRAZOLE SODIUM 40 MG: 40 TABLET, DELAYED RELEASE ORAL at 06:15

## 2023-12-14 RX ADMIN — INSULIN LISPRO 4 UNITS: 100 INJECTION, SOLUTION INTRAVENOUS; SUBCUTANEOUS at 11:40

## 2023-12-14 RX ADMIN — LISINOPRIL 5 MG: 5 TABLET ORAL at 08:29

## 2023-12-14 RX ADMIN — HYDRALAZINE HYDROCHLORIDE 100 MG: 25 TABLET, FILM COATED ORAL at 13:56

## 2023-12-14 RX ADMIN — CARVEDILOL 12.5 MG: 12.5 TABLET, FILM COATED ORAL at 21:00

## 2023-12-14 RX ADMIN — HYDRALAZINE HYDROCHLORIDE 100 MG: 25 TABLET, FILM COATED ORAL at 21:00

## 2023-12-14 RX ADMIN — OXYCODONE AND ACETAMINOPHEN 0.5 TABLET: 10; 325 TABLET ORAL at 21:00

## 2023-12-14 RX ADMIN — INSULIN LISPRO 4 UNITS: 100 INJECTION, SOLUTION INTRAVENOUS; SUBCUTANEOUS at 21:03

## 2023-12-14 RX ADMIN — DEXAMETHASONE SODIUM PHOSPHATE 10 MG: 10 INJECTION INTRAMUSCULAR; INTRAVENOUS at 08:33

## 2023-12-14 RX ADMIN — BARICITINIB 2 MG: 2 TABLET, FILM COATED ORAL at 08:42

## 2023-12-14 RX ADMIN — ZINC SULFATE 220 MG (50 MG) CAPSULE 50 MG: CAPSULE at 08:42

## 2023-12-14 RX ADMIN — ENOXAPARIN SODIUM 40 MG: 100 INJECTION SUBCUTANEOUS at 21:01

## 2023-12-14 RX ADMIN — FOLIC ACID 1 MG: 1 TABLET ORAL at 08:30

## 2023-12-14 RX ADMIN — OXYCODONE HYDROCHLORIDE AND ACETAMINOPHEN 500 MG: 500 TABLET ORAL at 08:42

## 2023-12-14 RX ADMIN — INSULIN LISPRO 8 UNITS: 100 INJECTION, SOLUTION INTRAVENOUS; SUBCUTANEOUS at 17:13

## 2023-12-14 RX ADMIN — GABAPENTIN 800 MG: 400 CAPSULE ORAL at 13:56

## 2023-12-14 RX ADMIN — CARVEDILOL 12.5 MG: 12.5 TABLET, FILM COATED ORAL at 08:31

## 2023-12-14 RX ADMIN — ALOGLIPTIN 12.5 MG: 12.5 TABLET, FILM COATED ORAL at 08:29

## 2023-12-14 RX ADMIN — INSULIN LISPRO 2 UNITS: 100 INJECTION, SOLUTION INTRAVENOUS; SUBCUTANEOUS at 08:42

## 2023-12-14 RX ADMIN — DOXYCYCLINE 100 MG: 100 CAPSULE ORAL at 08:28

## 2023-12-14 RX ADMIN — TRAZODONE HYDROCHLORIDE 50 MG: 50 TABLET ORAL at 21:01

## 2023-12-14 RX ADMIN — FINASTERIDE 5 MG: 5 TABLET, FILM COATED ORAL at 08:30

## 2023-12-14 RX ADMIN — HYDRALAZINE HYDROCHLORIDE 50 MG: 25 TABLET, FILM COATED ORAL at 06:15

## 2023-12-14 RX ADMIN — GUAIFENESIN 600 MG: 600 TABLET, EXTENDED RELEASE ORAL at 08:30

## 2023-12-14 RX ADMIN — CEFTRIAXONE 1000 MG: 1 INJECTION, POWDER, FOR SOLUTION INTRAMUSCULAR; INTRAVENOUS at 21:08

## 2023-12-14 RX ADMIN — OXYCODONE AND ACETAMINOPHEN 1 TABLET: 10; 325 TABLET ORAL at 08:30

## 2023-12-14 RX ADMIN — GABAPENTIN 800 MG: 400 CAPSULE ORAL at 21:00

## 2023-12-14 RX ADMIN — POLYETHYLENE GLYCOL 3350 17 G: 17 POWDER, FOR SOLUTION ORAL at 13:56

## 2023-12-14 RX ADMIN — GUAIFENESIN 600 MG: 600 TABLET, EXTENDED RELEASE ORAL at 21:01

## 2023-12-14 RX ADMIN — GABAPENTIN 800 MG: 400 CAPSULE ORAL at 08:29

## 2023-12-14 RX ADMIN — DOXYCYCLINE 100 MG: 100 CAPSULE ORAL at 21:00

## 2023-12-14 ASSESSMENT — PAIN SCALES - GENERAL: PAINLEVEL_OUTOF10: 7

## 2023-12-14 ASSESSMENT — PAIN DESCRIPTION - LOCATION: LOCATION: BACK;NECK

## 2023-12-14 NOTE — PROGRESS NOTES
Progress Note      Subjective:   Chief complaint:   Generalized weakness  Dysnea    Interval History:   Patient seen and examined this morning. Reports feeling better but not yet at baseline. Still requiring 4 L nasal cannula. At baseline, he does not use oxygen. Review of systems:   Constitutional:  Denies chills. Positive for fever POA. Eyes:  Denies eye pain or redness  HENT:  Denies nasal congestion or sore throat   Respiratory: Positive for cough, SOA and TALLEY. Cardiovascular:  Denies chest pain or edema   GI:  Denies abdominal pain, nausea, vomiting, bloody stools or diarrhea   :  Denies dysuria or frequency  Musculoskeletal:  Denies acute neck pain or body aches  Integument:  Denies rash or itching  Neurologic:  Denies headache, dizziness, numbness, tingling or unilateral weakness. Positive for generalized weakness. Psychiatric:  Denies acute depression or acute anxiety    Past medical history, surgical history, family history and social history reviewed and unchanged compared to H&P earlier this admission.     Medications:   Scheduled Meds:   magnesium oxide  400 mg Oral Daily    zinc sulfate  50 mg Oral Daily    ascorbic acid  500 mg Oral Daily    hydrALAZINE  100 mg Oral 3 times per day    insulin glargine  45 Units SubCUTAneous Nightly    insulin lispro  0-16 Units SubCUTAneous TID WC    insulin lispro  0-4 Units SubCUTAneous Nightly    cefTRIAXone (ROCEPHIN) IV  1,000 mg IntraVENous Q24H    doxycycline monohydrate  100 mg Oral 2 times per day    guaiFENesin  600 mg Oral BID    carvedilol  12.5 mg Oral BID    finasteride  5 mg Oral Daily    folic acid  1 mg Oral Daily    gabapentin  800 mg Oral TID    levothyroxine  88 mcg Oral Daily    alogliptin  12.5 mg Oral Daily    lisinopril  5 mg Oral Daily    pantoprazole  40 mg Oral QAM AC    oxyCODONE-acetaminophen  1 tablet Oral QAM    oxyCODONE-acetaminophen  0.5 tablet Oral QHS    tamsulosin  0.4 mg Oral Daily    traZODone  50 mg Oral Nightly [I10]  Blood placed sugar has been elevated. Titrate hydralazine and continue other medicine. Monitor closely.               Electronically signed by Arlet Kruger MD on 12/14/2023 at 11:36 PM

## 2023-12-14 NOTE — FLOWSHEET NOTE
12/14/23 0833   Assessment   Charting Type Shift assessment   Psychosocial   Psychosocial (WDL) WDL   Neurological   Neuro (WDL) WDL   Level of Consciousness 0   [place] Coma Scale   Eye Opening 4   Best Verbal Response 5   Best Motor Response 6   [place] Coma Scale Score 15   HEENT (Head, Ears, Eyes, Nose, & Throat)   HEENT (WDL) X   Right Eye Glasses   Left Eye Glasses   Respiratory   Respiratory (WDL) X   Respiratory Pattern Regular   Respiratory Depth Shallow   Respiratory Quality/Effort Unlabored   Chest Assessment Chest expansion symmetrical   L Breath Sounds Diminished   R Breath Sounds Diminished   Level of Activity/Mobility 0   Breath Sounds   Right Upper Lobe Diminished   Right Middle Lobe Diminished   Right Lower Lobe Diminished   Left Upper Lobe Diminished   Left Lower Lobe Diminished   Cardiac   Cardiac (WDL) WDL   Gastrointestinal   Abdominal (WDL) X   Abdomen Inspection Rounded; Soft   Last BM (including prior to admit) 12/12/23   RUQ Bowel Sounds Active   LUQ Bowel Sounds Active   RLQ Bowel Sounds Active   LLQ Bowel Sounds Active   Genitourinary   Genitourinary (WDL) WDL   Peripheral Vascular   Peripheral Vascular (WDL) WDL   Edema None   Skin Integumentary    Skin Integumentary (WDL) WDL   Skin Color Pink   Skin Condition/Temp Dry; Warm   Musculoskeletal   Musculoskeletal (WDL) X   RL Extremity Weakness   LL Extremity Weakness   Care Plan - Discharge Planning Goals   Discharge to home or other facility with appropriate resources Identify barriers to discharge with patient and caregiver; Identify discharge learning needs (meds, wound care, etc)     Pt is alert and oriented. Pt is resting in bed and appears to have no acute distress. Pt currently on 4 L NC. Pt's lung sounds are diminished. Pt encouraged to cough and deep breathe. Pt call bell and bedside table within reach.

## 2023-12-14 NOTE — PROGRESS NOTES
Occupational Therapy  Facility/Department: [facility] MED SURG  Daily Treatment Note  NAME: June Mccord  : 1955  MRN: [id number]    Date of Service: 2023    Discharge Recommendations:  Home with assist PRN         Patient Diagnosis(es): The primary encounter diagnosis was Acute respiratory failure with hypoxia ([address]). A diagnosis of COVID-19 was also pertinent to this visit. Assessment    Assessment: Pt received at EOB upon entry. Pt is pleasant and cooperative remains on 4L 02 at this time. Pt agreeable to complete LB dressing training with AE. Pt provided with reacher, sock aid, shoe horn, and LH sponge. Pt educated on each item and how to use item to increase independence with LB dressing tasks. Pt demo how to complete LB dressing after demonstration with CHAN. Pt tolerated well and completed task without difficulty. Pt asking about AD such as rollator and quad cane. Will trial AD with pt on following session. Pt left in room ambulating to bathroom independently. Subjective    I would love to try one of those walkers with a seat out sometime. Objective    Vitals           ADL  Equipment Provided: Reacher;Sock aid;Long-handled shoe horn;Long-handled sponge  LE Dressing: Setup       Goals  Short Term Goals  Time Frame for Short Term Goals: 3 days  Short Term Goal 1: Pt to complete LB dressing using AE with MOD I. Short Term Goal 2: Pt to complete shower with MOD I. Short Term Goal 3: Pt to tolerate x20 minutes of activity maintaining 02 sats >90%. AM-PAC - ADL       Therapy Time   Individual Concurrent Group Co-treatment   Time In 0108         Time Out 1323         Minutes 27          This note serves as a DC summary in the event of pt discharge.    Carrol Molina OTR/L

## 2023-12-14 NOTE — PLAN OF CARE
Problem: Discharge Planning  Goal: Discharge to home or other facility with appropriate resources  12/13/2023 2348 by Germania Alston RN  Outcome: Progressing  12/13/2023 1807 by Diedre Lombard, RN  Outcome: Progressing     Problem: Safety - Adult  Goal: Free from fall injury  12/13/2023 2348 by Germania Alston RN  Outcome: Progressing  12/13/2023 1807 by Diedre Lombard, RN  Outcome: Progressing     Problem: Chronic Conditions and Co-morbidities  Goal: Patient's chronic conditions and co-morbidity symptoms are monitored and maintained or improved  12/13/2023 2348 by Germania Alston RN  Outcome: Progressing  12/13/2023 1807 by Diedre Lombard, RN  Outcome: Progressing     Problem: Respiratory - Adult  Goal: Achieves optimal ventilation and oxygenation  12/13/2023 2348 by Germania Alston RN  Outcome: Progressing  12/13/2023 1807 by Diedre Lombard, RN  Outcome: Progressing     Problem: Infection - Adult  Goal: Absence of infection at discharge  12/13/2023 2348 by Germania Alston RN  Outcome: Progressing  12/13/2023 1807 by Diedre Lombard, RN  Outcome: Progressing  Goal: Absence of infection during hospitalization  12/13/2023 1807 by Diedre Lombard, RN  Outcome: Progressing     Problem: Metabolic/Fluid and Electrolytes - Adult  Goal: Electrolytes maintained within normal limits  12/13/2023 1807 by Diedre Lombard, RN  Outcome: Progressing

## 2023-12-14 NOTE — FLOWSHEET NOTE
12/14/23 1704   Urine Assessment   Bladder Scan Volume (mL) 999 mL   $ Bladder scan $ Yes     Per RADHAMES Tong, anchor quigley

## 2023-12-14 NOTE — PLAN OF CARE
Problem: Discharge Planning  Goal: Discharge to home or other facility with appropriate resources  12/14/2023 1158 by Benny Escobar RN  Outcome: Progressing  Flowsheets (Taken 12/14/2023 0466)  Discharge to home or other facility with appropriate resources:   Identify barriers to discharge with patient and caregiver   Identify discharge learning needs (meds, wound care, etc)  12/13/2023 2348 by Liz Montgomery RN  Outcome: Progressing     Problem: Safety - Adult  Goal: Free from fall injury  12/14/2023 1158 by Benny Escobar RN  Outcome: Progressing  12/13/2023 2348 by Liz Montgomery RN  Outcome: Progressing     Problem: Chronic Conditions and Co-morbidities  Goal: Patient's chronic conditions and co-morbidity symptoms are monitored and maintained or improved  12/14/2023 1158 by Benny Escobar RN  Outcome: Progressing  12/13/2023 2348 by Liz Montgomery RN  Outcome: Progressing     Problem: Respiratory - Adult  Goal: Achieves optimal ventilation and oxygenation  12/14/2023 1158 by Benny Escobar RN  Outcome: Progressing  12/13/2023 2348 by Liz Montgomery RN  Outcome: Progressing     Problem: Infection - Adult  Goal: Absence of infection at discharge  12/14/2023 1158 by Benny Escobar RN  Outcome: Progressing  12/13/2023 2348 by Liz Montgomery RN  Outcome: Progressing  Goal: Absence of infection during hospitalization  Outcome: Progressing     Problem: Metabolic/Fluid and Electrolytes - Adult  Goal: Electrolytes maintained within normal limits  Outcome: Progressing

## 2023-12-15 LAB
ALBUMIN SERPL-MCNC: 3.7 G/DL (ref 3.4–4.8)
ALBUMIN/GLOB SERPL: 1.4 {RATIO} (ref 0.8–2)
ALP SERPL-CCNC: 86 U/L (ref 25–100)
ALT SERPL-CCNC: 13 U/L (ref 4–36)
ANION GAP SERPL CALCULATED.3IONS-SCNC: 6 MMOL/L (ref 3–16)
AST SERPL-CCNC: 18 U/L (ref 8–33)
BASOPHILS # BLD: 0 K/UL (ref 0–0.1)
BASOPHILS NFR BLD: 0.1 %
BILIRUB SERPL-MCNC: 0.3 MG/DL (ref 0.3–1.2)
BUN SERPL-MCNC: 26 MG/DL (ref 6–20)
CALCIUM SERPL-MCNC: 8.6 MG/DL (ref 8.5–10.5)
CHLORIDE SERPL-SCNC: 99 MMOL/L (ref 98–107)
CO2 SERPL-SCNC: 29 MMOL/L (ref 20–30)
CREAT SERPL-MCNC: 1.4 MG/DL (ref 0.4–1.2)
EOSINOPHIL # BLD: 0 K/UL (ref 0–0.4)
EOSINOPHIL NFR BLD: 0.1 %
ERYTHROCYTE [DISTWIDTH] IN BLOOD BY AUTOMATED COUNT: 13 % (ref 11–16)
GFR SERPLBLD CREATININE-BSD FMLA CKD-EPI: 54 ML/MIN/{1.73_M2}
GLOBULIN SER CALC-MCNC: 2.6 G/DL
GLUCOSE BLD-MCNC: 179 MG/DL (ref 74–106)
GLUCOSE BLD-MCNC: 188 MG/DL (ref 74–106)
GLUCOSE BLD-MCNC: 272 MG/DL (ref 74–106)
GLUCOSE BLD-MCNC: 287 MG/DL (ref 74–106)
GLUCOSE SERPL-MCNC: 211 MG/DL (ref 74–106)
HCT VFR BLD AUTO: 35.2 % (ref 40–54)
HGB BLD-MCNC: 11.4 G/DL (ref 13–18)
IMM GRANULOCYTES # BLD: 0.1 K/UL
IMM GRANULOCYTES NFR BLD: 0.8 % (ref 0–5)
LYMPHOCYTES # BLD: 2.1 K/UL (ref 1.5–4)
LYMPHOCYTES NFR BLD: 14.8 %
MCH RBC QN AUTO: 30.2 PG (ref 27–32)
MCHC RBC AUTO-ENTMCNC: 32.4 G/DL (ref 31–35)
MCV RBC AUTO: 93.4 FL (ref 80–100)
MONOCYTES # BLD: 0.7 K/UL (ref 0.2–0.8)
MONOCYTES NFR BLD: 4.9 %
NEUTROPHILS # BLD: 11.4 K/UL (ref 2–7.5)
NEUTS SEG NFR BLD: 79.3 %
PERFORMED ON: ABNORMAL
PLATELET # BLD AUTO: 191 K/UL (ref 150–400)
PMV BLD AUTO: 10.5 FL (ref 6–10)
POTASSIUM SERPL-SCNC: 4.7 MMOL/L (ref 3.4–5.1)
PROT SERPL-MCNC: 6.3 G/DL (ref 6.4–8.3)
RBC # BLD AUTO: 3.77 M/UL (ref 4.5–6)
SODIUM SERPL-SCNC: 134 MMOL/L (ref 136–145)
WBC # BLD AUTO: 14.4 K/UL (ref 4–11)

## 2023-12-15 PROCEDURE — 85025 COMPLETE CBC W/AUTO DIFF WBC: CPT

## 2023-12-15 PROCEDURE — 6360000002 HC RX W HCPCS: Performed by: INTERNAL MEDICINE

## 2023-12-15 PROCEDURE — 36415 COLL VENOUS BLD VENIPUNCTURE: CPT

## 2023-12-15 PROCEDURE — 80053 COMPREHEN METABOLIC PANEL: CPT

## 2023-12-15 PROCEDURE — 99232 SBSQ HOSP IP/OBS MODERATE 35: CPT | Performed by: INTERNAL MEDICINE

## 2023-12-15 PROCEDURE — 6360000002 HC RX W HCPCS: Performed by: PHYSICIAN ASSISTANT

## 2023-12-15 PROCEDURE — 6370000000 HC RX 637 (ALT 250 FOR IP): Performed by: PHYSICIAN ASSISTANT

## 2023-12-15 PROCEDURE — 97530 THERAPEUTIC ACTIVITIES: CPT

## 2023-12-15 PROCEDURE — 1200000000 HC SEMI PRIVATE

## 2023-12-15 PROCEDURE — 6370000000 HC RX 637 (ALT 250 FOR IP): Performed by: INTERNAL MEDICINE

## 2023-12-15 PROCEDURE — 2580000003 HC RX 258: Performed by: PHYSICIAN ASSISTANT

## 2023-12-15 RX ADMIN — DEXAMETHASONE SODIUM PHOSPHATE 10 MG: 10 INJECTION INTRAMUSCULAR; INTRAVENOUS at 09:50

## 2023-12-15 RX ADMIN — HYDRALAZINE HYDROCHLORIDE 100 MG: 25 TABLET, FILM COATED ORAL at 06:30

## 2023-12-15 RX ADMIN — PANTOPRAZOLE SODIUM 40 MG: 40 TABLET, DELAYED RELEASE ORAL at 06:30

## 2023-12-15 RX ADMIN — INSULIN LISPRO 8 UNITS: 100 INJECTION, SOLUTION INTRAVENOUS; SUBCUTANEOUS at 17:17

## 2023-12-15 RX ADMIN — GUAIFENESIN 600 MG: 600 TABLET, EXTENDED RELEASE ORAL at 20:39

## 2023-12-15 RX ADMIN — ENOXAPARIN SODIUM 40 MG: 100 INJECTION SUBCUTANEOUS at 09:50

## 2023-12-15 RX ADMIN — OXYCODONE HYDROCHLORIDE AND ACETAMINOPHEN 500 MG: 500 TABLET ORAL at 09:51

## 2023-12-15 RX ADMIN — ENOXAPARIN SODIUM 40 MG: 100 INJECTION SUBCUTANEOUS at 20:37

## 2023-12-15 RX ADMIN — ALOGLIPTIN 12.5 MG: 12.5 TABLET, FILM COATED ORAL at 09:57

## 2023-12-15 RX ADMIN — GABAPENTIN 800 MG: 400 CAPSULE ORAL at 20:39

## 2023-12-15 RX ADMIN — Medication 400 MG: at 09:52

## 2023-12-15 RX ADMIN — FINASTERIDE 5 MG: 5 TABLET, FILM COATED ORAL at 09:51

## 2023-12-15 RX ADMIN — TAMSULOSIN HYDROCHLORIDE 0.4 MG: 0.4 CAPSULE ORAL at 09:51

## 2023-12-15 RX ADMIN — FOLIC ACID 1 MG: 1 TABLET ORAL at 09:52

## 2023-12-15 RX ADMIN — GABAPENTIN 800 MG: 400 CAPSULE ORAL at 09:51

## 2023-12-15 RX ADMIN — INSULIN GLARGINE 45 UNITS: 100 INJECTION, SOLUTION SUBCUTANEOUS at 20:42

## 2023-12-15 RX ADMIN — OXYCODONE AND ACETAMINOPHEN 1 TABLET: 10; 325 TABLET ORAL at 09:51

## 2023-12-15 RX ADMIN — CARVEDILOL 12.5 MG: 12.5 TABLET, FILM COATED ORAL at 20:39

## 2023-12-15 RX ADMIN — DOXYCYCLINE 100 MG: 100 CAPSULE ORAL at 20:39

## 2023-12-15 RX ADMIN — CEFTRIAXONE 1000 MG: 1 INJECTION, POWDER, FOR SOLUTION INTRAMUSCULAR; INTRAVENOUS at 20:41

## 2023-12-15 RX ADMIN — HYDRALAZINE HYDROCHLORIDE 100 MG: 25 TABLET, FILM COATED ORAL at 20:39

## 2023-12-15 RX ADMIN — GUAIFENESIN 600 MG: 600 TABLET, EXTENDED RELEASE ORAL at 09:52

## 2023-12-15 RX ADMIN — LISINOPRIL 5 MG: 5 TABLET ORAL at 09:51

## 2023-12-15 RX ADMIN — CARVEDILOL 12.5 MG: 12.5 TABLET, FILM COATED ORAL at 09:51

## 2023-12-15 RX ADMIN — OXYCODONE AND ACETAMINOPHEN 0.5 TABLET: 10; 325 TABLET ORAL at 20:38

## 2023-12-15 RX ADMIN — DOXYCYCLINE 100 MG: 100 CAPSULE ORAL at 09:50

## 2023-12-15 RX ADMIN — HYDRALAZINE HYDROCHLORIDE 100 MG: 25 TABLET, FILM COATED ORAL at 14:08

## 2023-12-15 RX ADMIN — BARICITINIB 2 MG: 2 TABLET, FILM COATED ORAL at 09:57

## 2023-12-15 RX ADMIN — ZINC SULFATE 220 MG (50 MG) CAPSULE 50 MG: CAPSULE at 09:51

## 2023-12-15 RX ADMIN — TRAZODONE HYDROCHLORIDE 50 MG: 50 TABLET ORAL at 20:39

## 2023-12-15 RX ADMIN — GABAPENTIN 800 MG: 400 CAPSULE ORAL at 14:08

## 2023-12-15 ASSESSMENT — PAIN DESCRIPTION - LOCATION: LOCATION: BACK

## 2023-12-15 ASSESSMENT — PAIN SCALES - GENERAL: PAINLEVEL_OUTOF10: 5

## 2023-12-15 NOTE — PLAN OF CARE
Problem: Discharge Planning  Goal: Discharge to home or other facility with appropriate resources  12/14/2023 2336 by Kun Soliman RN  Outcome: Progressing  12/14/2023 1158 by Sandy Jauregui RN  Outcome: Progressing  Flowsheets (Taken 12/14/2023 0448)  Discharge to home or other facility with appropriate resources:   Identify barriers to discharge with patient and caregiver   Identify discharge learning needs (meds, wound care, etc)     Problem: Safety - Adult  Goal: Free from fall injury  12/14/2023 2336 by Kun Soliman RN  Outcome: Progressing  12/14/2023 1158 by Sandy Jauregui RN  Outcome: Progressing     Problem: Chronic Conditions and Co-morbidities  Goal: Patient's chronic conditions and co-morbidity symptoms are monitored and maintained or improved  12/14/2023 2336 by Kun Soliman RN  Outcome: Progressing  12/14/2023 1158 by Sandy Jauregui RN  Outcome: Progressing     Problem: Respiratory - Adult  Goal: Achieves optimal ventilation and oxygenation  12/14/2023 2336 by Kun Soliman RN  Outcome: Progressing  12/14/2023 1158 by Sandy Jauregui RN  Outcome: Progressing     Problem: Infection - Adult  Goal: Absence of infection at discharge  12/14/2023 2336 by Kun Soliman RN  Outcome: Progressing  12/14/2023 1158 by Sandy Jauregui RN  Outcome: Progressing  Goal: Absence of infection during hospitalization  12/14/2023 1158 by Sandy Jauregui RN  Outcome: Progressing     Problem: Metabolic/Fluid and Electrolytes - Adult  Goal: Electrolytes maintained within normal limits  12/14/2023 1158 by Sandy Jauregui RN  Outcome: Progressing

## 2023-12-15 NOTE — PROGRESS NOTES
Occupational Therapy  Facility/Department: [facility] MED SURG  Daily Treatment Note  NAME: Abhi Yanez  : 1955  MRN: [id number]    Date of Service: 12/15/2023    Discharge Recommendations:  Home with assist PRN   Bariatric rollator    Patient Diagnosis(es): The primary encounter diagnosis was Acute respiratory failure with hypoxia ([address]). A diagnosis of COVID-19 was also pertinent to this visit. Assessment    Assessment: Pt received in bed on this date. Pt agreeable to therapy services. Pt requested trial of AD to promote energy consevation with functional mobility. OT brought in quad cane and rollator walker. Pt educated on quad cane safety and adjustments. Pt ambulated ~50 feet in room with SBA minor LOB with turning using quad cane but able to self correct. Pt provided with rollator. Pt educated on locking/unlocking brakes, safely sitting on rollator, how to fold/unfold, adjust height of handles as needed. Pt ambulated with rollator demo good safety awareness locking and unlocking brakes appropriately without verbal cuing. Pt positioned rollator against stable surface and transferred to sitting for rest break. Pt come to stand and ambulated ~50 feet tolerating well. Rollator will be beneficial for pt to increase independence with community mobility and decrease risk of falls. Pt left seated at EOB upon exit. Activity Tolerance: Patient tolerated treatment well     Subjective   Subjective  Subjective: I really like this walker I think it will help me a lot. Objective    Vitals   Pt education on rollator safety, quad cane safety awareness   Pt ambulated x50 feet x2 trials with each AD.    02 sats >90% throughout on room air    Goals  Short Term Goals  Time Frame for Short Term Goals: 3 days  Short Term Goal 1: Pt to complete LB dressing using AE with MOD I. Short Term Goal 2: Pt to complete shower with MOD I.   Short Term Goal 3: Pt to tolerate x20 minutes of activity maintaining 02 sats

## 2023-12-15 NOTE — PLAN OF CARE
Problem: Discharge Planning  Goal: Discharge to home or other facility with appropriate resources  12/15/2023 1253 by Joy Mcclellan RN  Outcome: Progressing  Flowsheets (Taken 12/15/2023 2460)  Discharge to home or other facility with appropriate resources:   Identify barriers to discharge with patient and caregiver   Identify discharge learning needs (meds, wound care, etc)  12/14/2023 2336 by Desiree Rodriguez RN  Outcome: Progressing     Problem: Safety - Adult  Goal: Free from fall injury  12/15/2023 1253 by Joy Mcclellan RN  Outcome: Progressing  12/14/2023 2336 by Desiree Rodriguez RN  Outcome: Progressing     Problem: Chronic Conditions and Co-morbidities  Goal: Patient's chronic conditions and co-morbidity symptoms are monitored and maintained or improved  12/15/2023 1253 by Joy Mcclellan RN  Outcome: Progressing  12/14/2023 2336 by Desiree Rodriguez RN  Outcome: Progressing     Problem: Respiratory - Adult  Goal: Achieves optimal ventilation and oxygenation  12/15/2023 1253 by Joy Mcclellan RN  Outcome: Progressing  12/14/2023 2336 by Desiree Rodriguez RN  Outcome: Progressing     Problem: Infection - Adult  Goal: Absence of infection at discharge  12/15/2023 1253 by Joy Mcclellan RN  Outcome: Progressing  12/14/2023 2336 by Desiree Rodriguez RN  Outcome: Progressing  Goal: Absence of infection during hospitalization  Outcome: Progressing     Problem: Metabolic/Fluid and Electrolytes - Adult  Goal: Electrolytes maintained within normal limits  Outcome: Progressing

## 2023-12-15 NOTE — FLOWSHEET NOTE
12/15/23 0937   Assessment   Charting Type Shift assessment   Psychosocial   Psychosocial (WDL) WDL   Neurological   Neuro (WDL) WDL   Level of Consciousness 0   [place] Coma Scale   Eye Opening 4   Best Verbal Response 5   Best Motor Response 6   [place] Coma Scale Score 15   HEENT (Head, Ears, Eyes, Nose, & Throat)   HEENT (WDL) X   Right Eye Glasses   Left Eye Glasses   Respiratory   Respiratory (WDL) X   Respiratory Pattern Regular   Respiratory Depth Normal   Respiratory Quality/Effort Unlabored   Chest Assessment Chest expansion symmetrical   L Breath Sounds Diminished   R Breath Sounds Diminished   Level of Activity/Mobility 0   Breath Sounds   Right Upper Lobe Diminished;Clear   Right Middle Lobe Diminished;Clear   Right Lower Lobe Diminished   Left Upper Lobe Diminished;Clear   Left Lower Lobe Diminished   Cardiac   Cardiac (WDL) WDL   Gastrointestinal   Abdominal (WDL) X   Abdomen Inspection Rounded; Soft   Last BM (including prior to admit) 12/12/23   RUQ Bowel Sounds Active   LUQ Bowel Sounds Active   RLQ Bowel Sounds Active   LLQ Bowel Sounds Active   Genitourinary   Genitourinary (WDL) WDL   Peripheral Vascular   Peripheral Vascular (WDL) WDL   Edema None   Skin Integumentary    Skin Integumentary (WDL) WDL   Skin Color Pink   Skin Condition/Temp Dry; Warm   Musculoskeletal   Musculoskeletal (WDL) X   RL Extremity Weakness   LL Extremity Weakness   Care Plan - Discharge Planning Goals   Discharge to home or other facility with appropriate resources Identify barriers to discharge with patient and caregiver; Identify discharge learning needs (meds, wound care, etc)     Pt is alert and oriented. Pt is resting in bed and appears to have no acute distress. Pt currently on room air. Pt currently on telemetry monitoring showing SV rhythym. Pt's lung sounds are clear and diminished. Pt encouraged to cough and deep breathe. Pt call bell and bedside table within reach.

## 2023-12-16 VITALS
OXYGEN SATURATION: 95 % | RESPIRATION RATE: 18 BRPM | HEART RATE: 81 BPM | DIASTOLIC BLOOD PRESSURE: 88 MMHG | WEIGHT: 315 LBS | BODY MASS INDEX: 42.66 KG/M2 | HEIGHT: 72 IN | TEMPERATURE: 98.1 F | SYSTOLIC BLOOD PRESSURE: 179 MMHG

## 2023-12-16 LAB
ALBUMIN SERPL-MCNC: 3.8 G/DL (ref 3.4–4.8)
ALBUMIN/GLOB SERPL: 1.6 {RATIO} (ref 0.8–2)
ALP SERPL-CCNC: 94 U/L (ref 25–100)
ALT SERPL-CCNC: 15 U/L (ref 4–36)
ANION GAP SERPL CALCULATED.3IONS-SCNC: 10 MMOL/L (ref 3–16)
AST SERPL-CCNC: 17 U/L (ref 8–33)
BILIRUB SERPL-MCNC: 0.3 MG/DL (ref 0.3–1.2)
BUN SERPL-MCNC: 22 MG/DL (ref 6–20)
CALCIUM SERPL-MCNC: 8.5 MG/DL (ref 8.5–10.5)
CHLORIDE SERPL-SCNC: 99 MMOL/L (ref 98–107)
CO2 SERPL-SCNC: 26 MMOL/L (ref 20–30)
CREAT SERPL-MCNC: 1.2 MG/DL (ref 0.4–1.2)
ERYTHROCYTE [DISTWIDTH] IN BLOOD BY AUTOMATED COUNT: 13.2 % (ref 11–16)
GFR SERPLBLD CREATININE-BSD FMLA CKD-EPI: >60 ML/MIN/{1.73_M2}
GLOBULIN SER CALC-MCNC: 2.4 G/DL
GLUCOSE BLD-MCNC: 256 MG/DL (ref 74–106)
GLUCOSE BLD-MCNC: 92 MG/DL (ref 74–106)
GLUCOSE SERPL-MCNC: 227 MG/DL (ref 74–106)
HCT VFR BLD AUTO: 35.5 % (ref 40–54)
HGB BLD-MCNC: 11.6 G/DL (ref 13–18)
MCH RBC QN AUTO: 30 PG (ref 27–32)
MCHC RBC AUTO-ENTMCNC: 32.7 G/DL (ref 31–35)
MCV RBC AUTO: 91.7 FL (ref 80–100)
PERFORMED ON: ABNORMAL
PERFORMED ON: NORMAL
PLATELET # BLD AUTO: 216 K/UL (ref 150–400)
PMV BLD AUTO: 10.6 FL (ref 6–10)
POTASSIUM SERPL-SCNC: 3.9 MMOL/L (ref 3.4–5.1)
PROT SERPL-MCNC: 6.2 G/DL (ref 6.4–8.3)
RBC # BLD AUTO: 3.87 M/UL (ref 4.5–6)
SODIUM SERPL-SCNC: 135 MMOL/L (ref 136–145)
WBC # BLD AUTO: 11.1 K/UL (ref 4–11)

## 2023-12-16 PROCEDURE — 6370000000 HC RX 637 (ALT 250 FOR IP): Performed by: PHYSICIAN ASSISTANT

## 2023-12-16 PROCEDURE — 36415 COLL VENOUS BLD VENIPUNCTURE: CPT

## 2023-12-16 PROCEDURE — 80053 COMPREHEN METABOLIC PANEL: CPT

## 2023-12-16 PROCEDURE — 6370000000 HC RX 637 (ALT 250 FOR IP): Performed by: INTERNAL MEDICINE

## 2023-12-16 PROCEDURE — 6360000002 HC RX W HCPCS: Performed by: INTERNAL MEDICINE

## 2023-12-16 PROCEDURE — 85027 COMPLETE CBC AUTOMATED: CPT

## 2023-12-16 PROCEDURE — 94618 PULMONARY STRESS TESTING: CPT

## 2023-12-16 RX ORDER — ASCORBIC ACID 500 MG
500 TABLET ORAL DAILY
Qty: 30 TABLET | Refills: 0 | Status: SHIPPED | OUTPATIENT
Start: 2023-12-17

## 2023-12-16 RX ORDER — INSULIN LISPRO 100 [IU]/ML
5 INJECTION, SOLUTION INTRAVENOUS; SUBCUTANEOUS
Qty: 10 ML | Refills: 1 | Status: SHIPPED | OUTPATIENT
Start: 2023-12-16

## 2023-12-16 RX ORDER — LANOLIN ALCOHOL/MO/W.PET/CERES
400 CREAM (GRAM) TOPICAL DAILY
Qty: 30 TABLET | Refills: 0 | Status: SHIPPED | OUTPATIENT
Start: 2023-12-17

## 2023-12-16 RX ORDER — INSULIN GLARGINE 100 [IU]/ML
45 INJECTION, SOLUTION SUBCUTANEOUS NIGHTLY
Qty: 90 ML | Refills: 3 | Status: SHIPPED | OUTPATIENT
Start: 2023-12-16

## 2023-12-16 RX ORDER — ALBUTEROL SULFATE 90 UG/1
2 AEROSOL, METERED RESPIRATORY (INHALATION) EVERY 4 HOURS PRN
Qty: 18 G | Refills: 3 | Status: SHIPPED | OUTPATIENT
Start: 2023-12-16

## 2023-12-16 RX ORDER — CLONIDINE HYDROCHLORIDE 0.1 MG/1
0.1 TABLET ORAL 2 TIMES DAILY PRN
Status: DISCONTINUED | OUTPATIENT
Start: 2023-12-16 | End: 2023-12-16 | Stop reason: HOSPADM

## 2023-12-16 RX ORDER — DEXAMETHASONE 1 MG
1 TABLET ORAL
Qty: 5 TABLET | Refills: 0 | Status: SHIPPED | OUTPATIENT
Start: 2023-12-16 | End: 2023-12-21

## 2023-12-16 RX ORDER — CLONIDINE HYDROCHLORIDE 0.1 MG/1
0.1 TABLET ORAL 2 TIMES DAILY PRN
Qty: 60 TABLET | Refills: 1 | Status: SHIPPED | OUTPATIENT
Start: 2023-12-16

## 2023-12-16 RX ORDER — DOXYCYCLINE 100 MG/1
100 CAPSULE ORAL EVERY 12 HOURS SCHEDULED
Qty: 7 CAPSULE | Refills: 0 | Status: SHIPPED | OUTPATIENT
Start: 2023-12-16 | End: 2023-12-20

## 2023-12-16 RX ORDER — CARVEDILOL 12.5 MG/1
12.5 TABLET ORAL 2 TIMES DAILY
Qty: 60 TABLET | Refills: 3 | Status: SHIPPED | OUTPATIENT
Start: 2023-12-16

## 2023-12-16 RX ORDER — ZINC SULFATE 50(220)MG
50 CAPSULE ORAL DAILY
Qty: 30 CAPSULE | Refills: 0 | COMMUNITY
Start: 2023-12-17

## 2023-12-16 RX ORDER — LISINOPRIL 10 MG/1
10 TABLET ORAL DAILY
Status: DISCONTINUED | OUTPATIENT
Start: 2023-12-16 | End: 2023-12-16 | Stop reason: HOSPADM

## 2023-12-16 RX ORDER — LISINOPRIL 20 MG/1
20 TABLET ORAL DAILY
Qty: 30 TABLET | Refills: 1 | Status: SHIPPED | OUTPATIENT
Start: 2023-12-17

## 2023-12-16 RX ORDER — POLYETHYLENE GLYCOL 3350 17 G/17G
17 POWDER, FOR SOLUTION ORAL DAILY PRN
Qty: 527 G | Refills: 0 | Status: SHIPPED | OUTPATIENT
Start: 2023-12-16 | End: 2024-01-15

## 2023-12-16 RX ORDER — HYDRALAZINE HYDROCHLORIDE 100 MG/1
100 TABLET, FILM COATED ORAL EVERY 8 HOURS SCHEDULED
Qty: 90 TABLET | Refills: 3 | Status: SHIPPED | OUTPATIENT
Start: 2023-12-16

## 2023-12-16 RX ORDER — INSULIN LISPRO 100 [IU]/ML
5 INJECTION, SOLUTION INTRAVENOUS; SUBCUTANEOUS
Status: DISCONTINUED | OUTPATIENT
Start: 2023-12-16 | End: 2023-12-16 | Stop reason: HOSPADM

## 2023-12-16 RX ORDER — GUAIFENESIN 600 MG/1
600 TABLET, EXTENDED RELEASE ORAL 2 TIMES DAILY
Qty: 10 TABLET | Refills: 0 | Status: SHIPPED | OUTPATIENT
Start: 2023-12-16 | End: 2023-12-21

## 2023-12-16 RX ORDER — GUAIFENESIN/DEXTROMETHORPHAN 100-10MG/5
5 SYRUP ORAL EVERY 4 HOURS PRN
Qty: 120 ML | Refills: 0 | Status: SHIPPED | OUTPATIENT
Start: 2023-12-16 | End: 2023-12-26

## 2023-12-16 RX ADMIN — DEXAMETHASONE SODIUM PHOSPHATE 10 MG: 10 INJECTION INTRAMUSCULAR; INTRAVENOUS at 08:59

## 2023-12-16 RX ADMIN — INSULIN LISPRO 8 UNITS: 100 INJECTION, SOLUTION INTRAVENOUS; SUBCUTANEOUS at 08:54

## 2023-12-16 RX ADMIN — TAMSULOSIN HYDROCHLORIDE 0.4 MG: 0.4 CAPSULE ORAL at 08:59

## 2023-12-16 RX ADMIN — LEVOTHYROXINE SODIUM 88 MCG: 0.09 TABLET ORAL at 05:42

## 2023-12-16 RX ADMIN — HYDRALAZINE HYDROCHLORIDE 100 MG: 25 TABLET, FILM COATED ORAL at 05:42

## 2023-12-16 RX ADMIN — FINASTERIDE 5 MG: 5 TABLET, FILM COATED ORAL at 08:59

## 2023-12-16 RX ADMIN — DOXYCYCLINE 100 MG: 100 CAPSULE ORAL at 08:59

## 2023-12-16 RX ADMIN — LISINOPRIL 10 MG: 10 TABLET ORAL at 09:03

## 2023-12-16 RX ADMIN — OXYCODONE HYDROCHLORIDE AND ACETAMINOPHEN 500 MG: 500 TABLET ORAL at 08:59

## 2023-12-16 RX ADMIN — GABAPENTIN 800 MG: 400 CAPSULE ORAL at 08:59

## 2023-12-16 RX ADMIN — Medication 400 MG: at 09:00

## 2023-12-16 RX ADMIN — ENOXAPARIN SODIUM 40 MG: 100 INJECTION SUBCUTANEOUS at 08:58

## 2023-12-16 RX ADMIN — ALOGLIPTIN 12.5 MG: 12.5 TABLET, FILM COATED ORAL at 08:59

## 2023-12-16 RX ADMIN — INSULIN LISPRO 5 UNITS: 100 INJECTION, SOLUTION INTRAVENOUS; SUBCUTANEOUS at 08:54

## 2023-12-16 RX ADMIN — GUAIFENESIN 600 MG: 600 TABLET, EXTENDED RELEASE ORAL at 08:59

## 2023-12-16 RX ADMIN — OXYCODONE AND ACETAMINOPHEN 1 TABLET: 10; 325 TABLET ORAL at 08:59

## 2023-12-16 RX ADMIN — PANTOPRAZOLE SODIUM 40 MG: 40 TABLET, DELAYED RELEASE ORAL at 05:42

## 2023-12-16 RX ADMIN — ZINC SULFATE 220 MG (50 MG) CAPSULE 50 MG: CAPSULE at 08:59

## 2023-12-16 RX ADMIN — CARVEDILOL 12.5 MG: 12.5 TABLET, FILM COATED ORAL at 09:00

## 2023-12-16 RX ADMIN — FOLIC ACID 1 MG: 1 TABLET ORAL at 09:00

## 2023-12-16 RX ADMIN — BARICITINIB 2 MG: 2 TABLET, FILM COATED ORAL at 08:58

## 2023-12-16 NOTE — PROGRESS NOTES
Discharge instructions given to pt written and verbally. Pt verbalizes understanding. All questions answered. Pt declines assistance via WC and is ambulatory to POV.

## 2023-12-16 NOTE — CARDIO/PULMONARY
6 MINUTE WALK TEST    Exercise type:   Pt waled in his room with no assistance . O2 sat RA/O2LPM RR VANDANA HR BP   Rest 96 RA 16  79    1 min 95 RA 18  86    2 min 95 RA 18  88    3 min 97 RA 18  87    4 min 95 RA 18  88    5 min 95 RA 18  90    6 min 94 RA 18  91    Recovery 97 RA 18  85      Distance walked: 400 feet    Breath sounds/patterns:  Breath sounds were unlabored clear/diminished     Comments:  Pt walked at a steady pace unassisted around his room. There were no signs of distress or shortness of breath.

## 2023-12-16 NOTE — DISCHARGE SUMMARY
mouth daily, Disp-30 capsule, R-0OTC      doxycycline monohydrate (MONODOX) 100 MG capsule Take 1 capsule by mouth every 12 hours for 7 doses, Disp-7 capsule, R-0Normal      ascorbic acid (VITAMIN C) 500 MG tablet Take 1 tablet by mouth daily, Disp-30 tablet, R-0Normal      dexamethasone (DECADRON) 1 MG tablet Take 1 tablet by mouth daily (with breakfast) for 5 days, Disp-5 tablet, R-0Normal                Details   insulin glargine (LANTUS SOLOSTAR) 100 UNIT/ML injection pen Inject 45 Units into the skin nightly, Disp-90 mL, R-3Normal      lisinopril (PRINIVIL;ZESTRIL) 20 MG tablet Take 1 tablet by mouth daily, Disp-30 tablet, R-1Normal      carvedilol (COREG) 12.5 MG tablet Take 1 tablet by mouth 2 times daily, Disp-60 tablet, R-3Normal                Details   sertraline (ZOLOFT) 50 MG tablet Take 1 tablet by mouth dailyHistorical Med      ferrous sulfate 324 (65 Fe) MG EC tablet Take 1 tablet by mouth daily (with breakfast), Disp-30 tablet, R-0Normal      finasteride (PROSCAR) 5 MG tablet Take 1 tablet by mouth daily, Disp-30 tablet, E-[id number]      folic acid (FOLVITE) 1 MG tablet Take 1 tablet by mouth daily, Disp-30 tablet, R-0Normal      metOLazone (ZAROXOLYN) 2.5 MG tablet Take 1 tablet by mouth as needed (worsening swelling, weight gain > 3 lbs) For swelling, Disp-45 tablet, R-3Adjust Sig      sublingual tablet cyanocobalamin 2500 MCG SUBL Place 1 tablet under the tongue daily, Disp-30 tablet, R-0Normal      tamsulosin (FLOMAX) 0.4 MG capsule Take 1 capsule by mouth daily, Disp-30 capsule, R-0Normal      gabapentin (NEURONTIN) 800 MG tablet Take 1 tablet by mouth 3 times daily. Historical Med      meclizine (ANTIVERT) 25 MG CHEW Take 1 tablet by mouth 3 times daily as needed (dizziness)Historical Med      !! oxyCODONE-acetaminophen (PERCOCET)  MG per tablet Take 1 tablet by mouth every morning. Historical Med      !! oxyCODONE-acetaminophen (PERCOCET)  MG per tablet Take 0.5 tablets by mouth nightly. Max Daily Amount: 0.5 tabletsHistorical Med      diclofenac sodium (VOLTAREN) 1 % GEL Apply topically 2 times daily, Topical, 2 TIMES DAILY Starting Wed 7/7/2021, Disp-300 g, R-3, Normal      glucose monitoring (FREESTYLE FREEDOM) kit DAILY Starting Wed 7/7/2021, Disp-1 kit, R-0, NormalSub covered brand BID and PRN Dx: DM2      !! blood glucose monitor strips BID and PRN Dx: DM2, Disp-300 strip, R-3, Normal      !! Lancets MISC DAILY Starting Wed 7/7/2021, Disp-300 each, R-3, NormalBID PRN Dx:DM2      traZODone (DESYREL) 100 MG tablet TAKE ONE TABLET BY MOUTH NIGHTLY for sleep, Disp-90 tablet, R-3Normal      levothyroxine (SYNTHROID) 88 MCG tablet Take 1 tablet by mouth daily thyroid, Disp-90 tablet, R-3Normal      Insulin Pen Needle (UNIFINE PENTIPS) 31G X 6 MM MISC Disp-100 each, R-5, NormalUSE TO INJECT INSULIN EVERY DAY      omeprazole (PRILOSEC) 40 MG delayed release capsule Take 1 capsule by mouth daily. For stomach, Disp-90 capsule, R-3Normal      linagliptin (TRADJENTA) 5 MG tablet TAKE 1 TABLET BY MOUTH DAILY for sugar, Disp-90 tablet, R-3Normal      pravastatin (PRAVACHOL) 80 MG tablet TAKE 1 TABLET BY MOUTH EVERY EVENING for cholesterol, Disp-90 tablet, R-3Normal      allopurinol (ZYLOPRIM) 100 MG tablet Take 1 tablet by mouth daily For gout, Disp-90 tablet, R-3Normal      Needles & Syringes MISC DAILY Starting 9/13/2017, Until Discontinued, Disp-50 each, R-5, Printu-100 insulin syringe      !! FREESTYLE LANCETS MISC Disp-100 each, R-5, Normal      !! FREESTYLE LITE strip TEST BLOOD SUGAR TWO TIMES A DAY, Disp-100 strip, R-5E11.9Normal      Respiratory Therapy Supplies PREMA Starting 10/9/2012, Until Discontinued, Disp-1 Device, R-0, Normal       !! - Potential duplicate medications found. Please discuss with provider. Patient was seen and examined. Discussed with Dr. Patrick Huggins via telephone and plan of care reviewed. Treatment plan was formulated collaboratively.

## 2023-12-16 NOTE — FLOWSHEET NOTE
12/16/23 0900   Assessment   Charting Type Shift assessment   Psychosocial   Psychosocial (WDL) WDL   Neurological   Neuro (WDL) WDL   Level of Consciousness 0   [place] Coma Scale   Eye Opening 4   Best Verbal Response 5   Best Motor Response 6   [place] Coma Scale Score 15   HEENT (Head, Ears, Eyes, Nose, & Throat)   HEENT (WDL) X   Right Eye Glasses   Left Eye Glasses   Respiratory   Respiratory (WDL) X   Respiratory Pattern Regular   Respiratory Depth Normal   Respiratory Quality/Effort Unlabored   Chest Assessment Chest expansion symmetrical   L Breath Sounds Diminished   R Breath Sounds Diminished   Cardiac   Cardiac (WDL) WDL   Gastrointestinal   Abdominal (WDL) X   Abdomen Inspection Rounded; Soft   RUQ Bowel Sounds Active   LUQ Bowel Sounds Active   RLQ Bowel Sounds Active   LLQ Bowel Sounds Active   Genitourinary   Genitourinary (WDL) WDL   Peripheral Vascular   Peripheral Vascular (WDL) WDL   Edema None   Skin Integumentary    Skin Integumentary (WDL) WDL   Skin Color Pink   Skin Condition/Temp Warm;Dry   Musculoskeletal   Musculoskeletal (WDL) X   RL Extremity Weakness   LL Extremity Weakness

## 2023-12-16 NOTE — FLOWSHEET NOTE
12/15/23 2000   Assessment   Charting Type Shift assessment   Psychosocial   Psychosocial (WDL) WDL   Neurological   Neuro (WDL) WDL   Level of Consciousness 0   [place] Coma Scale   Eye Opening 4   Best Verbal Response 5   Best Motor Response 6   [place] Coma Scale Score 15   HEENT (Head, Ears, Eyes, Nose, & Throat)   HEENT (WDL) X   Right Eye Glasses   Left Eye Glasses   Respiratory   Respiratory (WDL) X   Respiratory Pattern Regular   Respiratory Depth Normal   Respiratory Quality/Effort Unlabored   Chest Assessment Chest expansion symmetrical   L Breath Sounds Diminished   R Breath Sounds Diminished   Breath Sounds   Right Upper Lobe Diminished;Clear   Right Middle Lobe Diminished;Clear   Right Lower Lobe Diminished   Left Upper Lobe Diminished;Clear   Left Lower Lobe Diminished   Cardiac   Cardiac (WDL) WDL   Gastrointestinal   Abdominal (WDL) X   Abdomen Inspection Rounded; Soft   RUQ Bowel Sounds Active   LUQ Bowel Sounds Active   RLQ Bowel Sounds Active   LLQ Bowel Sounds Active   Genitourinary   Genitourinary (WDL) WDL   Peripheral Vascular   Peripheral Vascular (WDL) WDL   Edema None   Skin Integumentary    Skin Integumentary (WDL) WDL   Skin Color Pink   Skin Condition/Temp Dry; Warm   Musculoskeletal   Musculoskeletal (WDL) X   RL Extremity Weakness   LL Extremity Weakness

## 2023-12-16 NOTE — PROGRESS NOTES
Progress Note      Subjective:   Chief complaint:   Generalized weakness  Dysnea    Interval History:   No acute events overnight. Afebrile. Patient oxygen decreased from 4 to 2 L. At baseline he does not use oxygen at home. Still some congestion and cough. He reports he did have a sleep study but still does not have a CPAP. No worsening shortness of breath. Patient did have oxygen off during sleep which dropped and they had to put 2 L back on. Review of systems:   Constitutional:  Denies chills. Eyes:  Denies eye pain or redness  HENT:  Denies nasal congestion or sore throat   Respiratory: Positive for cough, SOA and TALLEY. Cardiovascular:  Denies chest pain or edema   GI:  Denies abdominal pain, nausea, vomiting, bloody stools or diarrhea   :  Denies dysuria or frequency  Musculoskeletal:  Denies acute neck pain or body aches  Integument:  Denies rash or itching  Neurologic:  Denies headache, dizziness, numbness, tingling or unilateral weakness. Positive for generalized weakness. Psychiatric:  Denies acute depression or acute anxiety    Past medical history, surgical history, family history and social history reviewed and unchanged compared to H&P earlier this admission.     Medications:   Scheduled Meds:   magnesium oxide  400 mg Oral Daily    zinc sulfate  50 mg Oral Daily    ascorbic acid  500 mg Oral Daily    hydrALAZINE  100 mg Oral 3 times per day    insulin glargine  45 Units SubCUTAneous Nightly    insulin lispro  0-16 Units SubCUTAneous TID WC    insulin lispro  0-4 Units SubCUTAneous Nightly    cefTRIAXone (ROCEPHIN) IV  1,000 mg IntraVENous Q24H    doxycycline monohydrate  100 mg Oral 2 times per day    guaiFENesin  600 mg Oral BID    carvedilol  12.5 mg Oral BID    finasteride  5 mg Oral Daily    folic acid  1 mg Oral Daily    gabapentin  800 mg Oral TID    levothyroxine  88 mcg Oral Daily    alogliptin  12.5 mg Oral Daily    lisinopril  5 mg Oral Daily    pantoprazole  40 mg Oral QAM

## 2023-12-17 LAB
BACTERIA BLD CULT ORG #2: NORMAL
BACTERIA BLD CULT: NORMAL

## 2023-12-18 NOTE — CARE COORDINATION
Patient states he is feeling better. He stated medicines were discussed with him and he feels he has all he needs to take of himself at home. He states a lady was helping him get a walker with a brake and a seat to help him walk easier. An appointment with Dr. Duc Snowden was set for 1/18/2024 at 3:15 PM. Patient was told he could contact 12/20/2023 Cordelia Greene for an appointment. He had no further questions.

## 2024-01-16 ENCOUNTER — HOSPITAL ENCOUNTER (OUTPATIENT)
Dept: GENERAL RADIOLOGY | Facility: HOSPITAL | Age: 69
Discharge: HOME | End: 2024-01-16
Payer: MEDICARE

## 2024-01-16 ENCOUNTER — HOSPITAL ENCOUNTER (OUTPATIENT)
Facility: HOSPITAL | Age: 69
Discharge: HOME | End: 2024-01-16
Payer: MEDICARE

## 2024-01-16 DIAGNOSIS — Z79.4 TYPE 2 DIABETES MELLITUS WITH DIABETIC NEUROPATHY, WITH LONG-TERM CURRENT USE OF INSULIN (HCC): ICD-10-CM

## 2024-01-16 DIAGNOSIS — N17.9 ACUTE KIDNEY INJURY SUPERIMPOSED ON CKD (HCC): ICD-10-CM

## 2024-01-16 DIAGNOSIS — U07.1 PNEUMONIA DUE TO COVID-19 VIRUS: ICD-10-CM

## 2024-01-16 DIAGNOSIS — J12.82 PNEUMONIA DUE TO COVID-19 VIRUS: ICD-10-CM

## 2024-01-16 DIAGNOSIS — I10 PRIMARY HYPERTENSION: Chronic | ICD-10-CM

## 2024-01-16 DIAGNOSIS — E11.40 TYPE 2 DIABETES MELLITUS WITH DIABETIC NEUROPATHY, WITH LONG-TERM CURRENT USE OF INSULIN (HCC): ICD-10-CM

## 2024-01-16 DIAGNOSIS — N18.9 ACUTE KIDNEY INJURY SUPERIMPOSED ON CKD (HCC): ICD-10-CM

## 2024-01-16 LAB
ALBUMIN SERPL-MCNC: 4.1 G/DL (ref 3.4–4.8)
ALBUMIN/GLOB SERPL: 1.7 {RATIO} (ref 0.8–2)
ALP SERPL-CCNC: 126 U/L (ref 25–100)
ALT SERPL-CCNC: 16 U/L (ref 4–36)
ANION GAP SERPL CALCULATED.3IONS-SCNC: 9 MMOL/L (ref 3–16)
AST SERPL-CCNC: 14 U/L (ref 8–33)
BILIRUB SERPL-MCNC: 0.3 MG/DL (ref 0.3–1.2)
BUN SERPL-MCNC: 19 MG/DL (ref 6–20)
CALCIUM SERPL-MCNC: 9.1 MG/DL (ref 8.5–10.5)
CHLORIDE SERPL-SCNC: 102 MMOL/L (ref 98–107)
CO2 SERPL-SCNC: 27 MMOL/L (ref 20–30)
CREAT SERPL-MCNC: 1.5 MG/DL (ref 0.4–1.2)
ERYTHROCYTE [DISTWIDTH] IN BLOOD BY AUTOMATED COUNT: 12.6 % (ref 11–16)
GFR SERPLBLD CREATININE-BSD FMLA CKD-EPI: 50 ML/MIN/{1.73_M2}
GLOBULIN SER CALC-MCNC: 2.4 G/DL
GLUCOSE SERPL-MCNC: 219 MG/DL (ref 74–106)
HCT VFR BLD AUTO: 37.2 % (ref 40–54)
HGB BLD-MCNC: 12.5 G/DL (ref 13–18)
MAGNESIUM SERPL-MCNC: 1.6 MG/DL (ref 1.7–2.4)
MCH RBC QN AUTO: 30.1 PG (ref 27–32)
MCHC RBC AUTO-ENTMCNC: 33.6 G/DL (ref 31–35)
MCV RBC AUTO: 89.6 FL (ref 80–100)
PLATELET # BLD AUTO: 221 K/UL (ref 150–400)
PMV BLD AUTO: 10.7 FL (ref 6–10)
POTASSIUM SERPL-SCNC: 4.2 MMOL/L (ref 3.4–5.1)
PROT SERPL-MCNC: 6.5 G/DL (ref 6.4–8.3)
RBC # BLD AUTO: 4.15 M/UL (ref 4.5–6)
SODIUM SERPL-SCNC: 138 MMOL/L (ref 136–145)
WBC # BLD AUTO: 9.7 K/UL (ref 4–11)

## 2024-01-16 PROCEDURE — 36415 COLL VENOUS BLD VENIPUNCTURE: CPT

## 2024-01-16 PROCEDURE — 85027 COMPLETE CBC AUTOMATED: CPT

## 2024-01-16 PROCEDURE — 71046 X-RAY EXAM CHEST 2 VIEWS: CPT

## 2024-01-16 PROCEDURE — 80053 COMPREHEN METABOLIC PANEL: CPT

## 2024-01-16 PROCEDURE — 83735 ASSAY OF MAGNESIUM: CPT

## 2024-01-18 ENCOUNTER — OFFICE VISIT (OUTPATIENT)
Dept: PRIMARY CARE CLINIC | Age: 69
End: 2024-01-18

## 2024-01-18 VITALS
BODY MASS INDEX: 47.47 KG/M2 | SYSTOLIC BLOOD PRESSURE: 136 MMHG | WEIGHT: 315 LBS | DIASTOLIC BLOOD PRESSURE: 70 MMHG | OXYGEN SATURATION: 95 % | HEART RATE: 68 BPM | RESPIRATION RATE: 18 BRPM

## 2024-01-18 DIAGNOSIS — G47.33 OBSTRUCTIVE SLEEP APNEA SYNDROME: Chronic | ICD-10-CM

## 2024-01-18 DIAGNOSIS — I10 PRIMARY HYPERTENSION: ICD-10-CM

## 2024-01-18 DIAGNOSIS — Z79.4 TYPE 2 DIABETES MELLITUS WITH STAGE 3A CHRONIC KIDNEY DISEASE, WITH LONG-TERM CURRENT USE OF INSULIN (HCC): Primary | ICD-10-CM

## 2024-01-18 DIAGNOSIS — E11.22 TYPE 2 DIABETES MELLITUS WITH STAGE 3A CHRONIC KIDNEY DISEASE, WITH LONG-TERM CURRENT USE OF INSULIN (HCC): Primary | ICD-10-CM

## 2024-01-18 DIAGNOSIS — N18.31 TYPE 2 DIABETES MELLITUS WITH STAGE 3A CHRONIC KIDNEY DISEASE, WITH LONG-TERM CURRENT USE OF INSULIN (HCC): Primary | ICD-10-CM

## 2024-01-18 DIAGNOSIS — E78.5 HYPERLIPIDEMIA, UNSPECIFIED HYPERLIPIDEMIA TYPE: ICD-10-CM

## 2024-01-18 DIAGNOSIS — E03.9 HYPOTHYROIDISM, UNSPECIFIED TYPE: ICD-10-CM

## 2024-01-18 RX ORDER — LANOLIN ALCOHOL/MO/W.PET/CERES
400 CREAM (GRAM) TOPICAL 2 TIMES DAILY
Qty: 60 TABLET | Refills: 3 | Status: SHIPPED | OUTPATIENT
Start: 2024-01-18

## 2024-01-18 RX ORDER — LANOLIN ALCOHOL/MO/W.PET/CERES
400 CREAM (GRAM) TOPICAL DAILY
Qty: 30 TABLET | Refills: 0 | OUTPATIENT
Start: 2024-01-18

## 2024-01-18 RX ORDER — SEMAGLUTIDE 0.68 MG/ML
0.25 INJECTION, SOLUTION SUBCUTANEOUS WEEKLY
Qty: 3 ML | Refills: 0 | OUTPATIENT
Start: 2024-01-18

## 2024-01-18 SDOH — ECONOMIC STABILITY: FOOD INSECURITY: WITHIN THE PAST 12 MONTHS, THE FOOD YOU BOUGHT JUST DIDN'T LAST AND YOU DIDN'T HAVE MONEY TO GET MORE.: NEVER TRUE

## 2024-01-18 SDOH — ECONOMIC STABILITY: HOUSING INSECURITY
IN THE LAST 12 MONTHS, WAS THERE A TIME WHEN YOU DID NOT HAVE A STEADY PLACE TO SLEEP OR SLEPT IN A SHELTER (INCLUDING NOW)?: NO

## 2024-01-18 SDOH — ECONOMIC STABILITY: FOOD INSECURITY: WITHIN THE PAST 12 MONTHS, YOU WORRIED THAT YOUR FOOD WOULD RUN OUT BEFORE YOU GOT MONEY TO BUY MORE.: NEVER TRUE

## 2024-01-18 SDOH — ECONOMIC STABILITY: INCOME INSECURITY: HOW HARD IS IT FOR YOU TO PAY FOR THE VERY BASICS LIKE FOOD, HOUSING, MEDICAL CARE, AND HEATING?: NOT HARD AT ALL

## 2024-01-18 ASSESSMENT — PATIENT HEALTH QUESTIONNAIRE - PHQ9
SUM OF ALL RESPONSES TO PHQ9 QUESTIONS 1 & 2: 0
SUM OF ALL RESPONSES TO PHQ QUESTIONS 1-9: 0
2. FEELING DOWN, DEPRESSED OR HOPELESS: 0
1. LITTLE INTEREST OR PLEASURE IN DOING THINGS: 0
SUM OF ALL RESPONSES TO PHQ QUESTIONS 1-9: 0

## 2024-01-18 ASSESSMENT — ENCOUNTER SYMPTOMS
COUGH: 0
WHEEZING: 0
SHORTNESS OF BREATH: 0
ABDOMINAL PAIN: 0
EYE DISCHARGE: 0
NAUSEA: 0
VOMITING: 0
SINUS PRESSURE: 0
BACK PAIN: 1

## 2024-01-18 NOTE — PROGRESS NOTES
Chief Complaint   Patient presents with    Established New Doctor       Have you seen any other physician or provider since your last visit yes -     Have you had any other diagnostic tests since your last visit? no    Have you changed or stopped any medications since your last visit? yes -

## 2024-01-18 NOTE — PROGRESS NOTES
SUBJECTIVE:    Patient ID: Alfredo Perez is a 68 y.o.male.    Chief Complaint   Patient presents with    Established New Doctor         HPI:  Patient here to establish care with provider.   Patient has had diabetes for the past several years.  He has been compliant with the medications and denies any side effects from it. He has been monitoring fingersticks on a daily basis.   He denies any hypoglycemic symptoms. He has been following a diabetic diet and has  been active.  His last eye exam was more than a year ago.  He is using oral meds and insulin. He is not using humalog. He is using Lantus 45 units nightly.     Patient has had hypertension and hyperlipidemia for several years. He has been compliant with taking medications, without side effects from it. He has been following appropriate diet.  He is active and never exercises.  Weight is stable, compared to last visit. His blood pressure is stable at this time.    Patient's medications, allergies, past medical, surgical, social and family histories were reviewed and updated as appropriate in electronic medical record.        Outpatient Medications Marked as Taking for the 1/18/24 encounter (Office Visit) with Starr Morrison MD   Medication Sig Dispense Refill    albuterol sulfate HFA (PROVENTIL;VENTOLIN;PROAIR) 108 (90 Base) MCG/ACT inhaler Inhale 2 puffs into the lungs every 4 hours as needed for Wheezing 18 g 3    insulin glargine (LANTUS SOLOSTAR) 100 UNIT/ML injection pen Inject 45 Units into the skin nightly 90 mL 3    insulin lispro (HUMALOG) 100 UNIT/ML SOLN injection vial Inject 5 Units into the skin 3 times daily (with meals) 10 mL 1    hydrALAZINE (APRESOLINE) 100 MG tablet Take 1 tablet by mouth every 8 hours 90 tablet 3    cloNIDine (CATAPRES) 0.1 MG tablet Take 1 tablet by mouth 2 times daily as needed for High Blood Pressure (take if top number 150 or greater) 60 tablet 1    lisinopril (PRINIVIL;ZESTRIL) 20 MG tablet Take 1 tablet by mouth daily

## 2024-01-20 ENCOUNTER — HOSPITAL ENCOUNTER (EMERGENCY)
Facility: HOSPITAL | Age: 69
Discharge: HOME | End: 2024-01-20
Attending: HOSPITALIST
Payer: MEDICARE

## 2024-01-20 ENCOUNTER — APPOINTMENT (OUTPATIENT)
Dept: GENERAL RADIOLOGY | Facility: HOSPITAL | Age: 69
End: 2024-01-20
Payer: MEDICARE

## 2024-01-20 VITALS
BODY MASS INDEX: 42.66 KG/M2 | SYSTOLIC BLOOD PRESSURE: 154 MMHG | TEMPERATURE: 97.9 F | HEART RATE: 62 BPM | HEIGHT: 72 IN | OXYGEN SATURATION: 98 % | RESPIRATION RATE: 16 BRPM | DIASTOLIC BLOOD PRESSURE: 86 MMHG | WEIGHT: 315 LBS

## 2024-01-20 DIAGNOSIS — M25.512 ACUTE PAIN OF LEFT SHOULDER: ICD-10-CM

## 2024-01-20 DIAGNOSIS — M25.552 LEFT HIP PAIN: ICD-10-CM
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
## 2024-01-20 PROCEDURE — 99284 EMERGENCY DEPT VISIT MOD MDM: CPT

## 2024-01-20 PROCEDURE — 96372 THER/PROPH/DIAG INJ SC/IM: CPT

## 2024-01-20 PROCEDURE — 73030 X-RAY EXAM OF SHOULDER: CPT

## 2024-01-20 PROCEDURE — 73502 X-RAY EXAM HIP UNI 2-3 VIEWS: CPT

## 2024-01-20 PROCEDURE — 6360000002 HC RX W HCPCS: Performed by: HOSPITALIST

## 2024-01-20 RX ADMIN — HYDROMORPHONE HYDROCHLORIDE 1 MG: 1 INJECTION, SOLUTION INTRAMUSCULAR; INTRAVENOUS; SUBCUTANEOUS at 17:47

## 2024-01-20 ASSESSMENT — LIFESTYLE VARIABLES
HOW MANY STANDARD DRINKS CONTAINING ALCOHOL DO YOU HAVE ON A TYPICAL DAY: PATIENT DOES NOT DRINK
HOW OFTEN DO YOU HAVE A DRINK CONTAINING ALCOHOL: NEVER

## 2024-01-20 NOTE — ED NOTES
Patient requests sling at this time. MD Huber provided verbal order for sling at this time for discharge.

## 2024-01-20 NOTE — ED TRIAGE NOTES
Patient was stepping out of his [place] truck at a gas station, put left foot down and holding onto steering wheel. States his left foot gave way and he \"fell hard\". His family tried to get him to come to ER last night but patient refused stating he would be alright. Patient reports it began hurting so bad today, he had to come.     Chief complaints are: Left shoulder pain, left hip pain, left shoulder blade/upper back pain.

## 2024-01-20 NOTE — ED PROVIDER NOTES
[facility]  EMERGENCY DEPARTMENT ENCOUNTER        Pt Name: Alfredo Perez  MRN: [id number]  Birthdate 1955  Date of evaluation: 1/20/2024  Provider: Андрей Huber DO  PCP: Starr Morrison MD  Note Started: 4:14 PM EST 1/20/24    CHIEF COMPLAINT       Chief Complaint   Patient presents with    Fall     Left shoulder pain, left hip pain, left shoulder blade/upper back pain.   Fell yesterday 01/19/2024.        HISTORY OF PRESENT ILLNESS: 1 or more Elements     History from : Patient    Limitations to history : None    Alfredo Perez is a 68 y.o. 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  Patient hit against his left shoulder and left hip area.  He denies any head injury or loss of consciousness.  Patient rates his pain as a 10 out of 10 at this time and states it feels like there is just a little needles poking him everywhere.  Patient is been able to ambulate without any difficulty on the left hip area.  He also has been able to move the left shoulder but when asked where it hurts the most he points over the glenohumeral head area.  He also has some pain that radiates across the distribution of the trapezius muscle across his mid back.  He denies any numbness tingling or weakness to the extremity.  Denies any other complaint at this time.  Patient states he already goes to a pain management clinic he takes Percocet tens and they have not helped his pain at all.  Past medical history significant for chronic back pain, chronic kidney disease, degenerative disc disease, GERD, hypercholesterolemia, hypertension, left lumbar radiculopathy, obesity, type 2 diabetes and unspecified sleep apnea.    Nursing Notes were all reviewed and agreed with or any disagreements were addressed in the HPI.    REVIEW OF SYSTEMS :      Review of  Systems    Review of systems reviewed and negative except as in HPI/MDM    PAST MEDICAL HISTORY     Past Medical History:   Diagnosis Date    Chronic back pain     feet,hands,legs,knees    Chronic kidney disease     Stage III    DDD (degenerative disc disease), lumbar     GERD (gastroesophageal reflux disease)     Hypercholesteremia     Hypertension     Left lumbar radiculopathy     Obesity     Type II or unspecified type diabetes mellitus without mention of complication, not stated as uncontrolled 2011    Unspecified sleep apnea        SURGICAL HISTORY   History reviewed. No pertinent surgical history.    CURRENTMEDICATIONS       Previous Medications    ALBUTEROL SULFATE HFA (PROVENTIL;VENTOLIN;PROAIR) 108 (90 BASE) MCG/ACT INHALER    Inhale 2 puffs into the lungs every 4 hours as needed for Wheezing    ALLOPURINOL (ZYLOPRIM) 100 MG TABLET    Take 1 tablet by mouth daily For gout    ASCORBIC ACID (VITAMIN C) 500 MG TABLET    Take 1 tablet by mouth daily    BLOOD GLUCOSE MONITOR STRIPS    BID and PRN Dx: DM2    CARVEDILOL (COREG) 12.5 MG TABLET    Take 1 tablet by mouth 2 times daily    CLONIDINE (CATAPRES) 0.1 MG TABLET    Take 1 tablet by mouth 2 times daily as needed for High Blood Pressure (take if top number 150 or greater)    FERROUS SULFATE 324 (65 FE) MG EC TABLET    Take 1 tablet by mouth daily (with breakfast)    FINASTERIDE (PROSCAR) 5 MG TABLET    Take 1 tablet by mouth daily    FOLIC ACID (FOLVITE) 1 MG TABLET    Take 1 tablet by mouth daily    FREESTYLE LANCETS MISC    TEST TWO TIMES A DAY    FREESTYLE LITE STRIP    TEST BLOOD SUGAR TWO TIMES A DAY    GABAPENTIN (NEURONTIN) 800 MG TABLET    Take 1 tablet by mouth 3 times daily.    GLUCOSE MONITORING (FREESTYLE FREEDOM) KIT    1 kit by Does not apply route daily Sub covered brand BID and PRN Dx: DM2    HYDRALAZINE (APRESOLINE) 100 MG TABLET    Take 1 tablet by mouth every 8 hours    INSULIN GLARGINE (LANTUS SOLOSTAR) 100 UNIT/ML INJECTION PEN    Inject

## 2024-01-20 NOTE — ED NOTES
Discharge instructions provided to patient. Patient verbalizes understanding of d/c plan and follow up care. Sling applied. Patient called his own ride via cell phone. Patient ambulatory off unit to POV at this time with no further needs noted.

## 2024-01-30 PROCEDURE — 96372 THER/PROPH/DIAG INJ SC/IM: CPT

## 2024-01-30 PROCEDURE — 99284 EMERGENCY DEPT VISIT MOD MDM: CPT

## 2024-01-31 ENCOUNTER — HOSPITAL ENCOUNTER (EMERGENCY)
Facility: HOSPITAL | Age: 69
Discharge: HOME | End: 2024-01-31
Attending: EMERGENCY MEDICINE
Payer: MEDICARE

## 2024-01-31 ENCOUNTER — APPOINTMENT (OUTPATIENT)
Dept: CT IMAGING | Facility: HOSPITAL | Age: 69
End: 2024-01-31
Attending: EMERGENCY MEDICINE
Payer: MEDICARE

## 2024-01-31 VITALS
RESPIRATION RATE: 18 BRPM | OXYGEN SATURATION: 95 % | WEIGHT: 315 LBS | HEIGHT: 72 IN | SYSTOLIC BLOOD PRESSURE: 117 MMHG | DIASTOLIC BLOOD PRESSURE: 60 MMHG | HEART RATE: 50 BPM | BODY MASS INDEX: 42.66 KG/M2 | TEMPERATURE: 97.9 F

## 2024-01-31 DIAGNOSIS — S39.012A BACK STRAIN, INITIAL ENCOUNTER: Primary | ICD-10-CM

## 2024-01-31 DIAGNOSIS — M62.838 SPASM OF MUSCLE: ICD-10-CM

## 2024-01-31 PROCEDURE — 72128 CT CHEST SPINE W/O DYE: CPT

## 2024-01-31 PROCEDURE — 6360000002 HC RX W HCPCS: Performed by: EMERGENCY MEDICINE

## 2024-01-31 PROCEDURE — 6370000000 HC RX 637 (ALT 250 FOR IP): Performed by: EMERGENCY MEDICINE

## 2024-01-31 RX ORDER — ASCORBIC ACID 500 MG
500 TABLET ORAL DAILY
Qty: 90 TABLET | Refills: 1 | Status: SHIPPED | OUTPATIENT
Start: 2024-01-31

## 2024-01-31 RX ORDER — ALLOPURINOL 100 MG/1
100 TABLET ORAL DAILY
Qty: 90 TABLET | Refills: 1 | Status: SHIPPED | OUTPATIENT
Start: 2024-01-31 | End: 2025-01-30

## 2024-01-31 RX ORDER — TAMSULOSIN HYDROCHLORIDE 0.4 MG/1
0.4 CAPSULE ORAL DAILY
Qty: 90 CAPSULE | Refills: 1 | Status: SHIPPED | OUTPATIENT
Start: 2024-01-31

## 2024-01-31 RX ORDER — FOLIC ACID 1 MG/1
1 TABLET ORAL DAILY
Qty: 90 TABLET | Refills: 1 | Status: SHIPPED | OUTPATIENT
Start: 2024-01-31

## 2024-01-31 RX ORDER — FERROUS SULFATE 324(65)MG
324 TABLET, DELAYED RELEASE (ENTERIC COATED) ORAL
Qty: 90 TABLET | Refills: 1 | Status: SHIPPED | OUTPATIENT
Start: 2024-01-31

## 2024-01-31 RX ORDER — FINASTERIDE 5 MG/1
5 TABLET, FILM COATED ORAL DAILY
Qty: 90 TABLET | Refills: 1 | Status: SHIPPED | OUTPATIENT
Start: 2024-01-31

## 2024-01-31 RX ORDER — LIDOCAINE 50 MG/G
1 PATCH TOPICAL DAILY
Qty: 5 PATCH | Refills: 0 | Status: SHIPPED | OUTPATIENT
Start: 2024-01-31 | End: 2024-01-31

## 2024-01-31 RX ORDER — TIZANIDINE 4 MG/1
4 TABLET ORAL EVERY 6 HOURS PRN
Qty: 30 TABLET | Refills: 0 | Status: SHIPPED | OUTPATIENT
Start: 2024-01-31

## 2024-01-31 RX ORDER — TIZANIDINE 4 MG/1
4 TABLET ORAL EVERY 6 HOURS PRN
Qty: 30 TABLET | Refills: 0 | Status: SHIPPED | OUTPATIENT
Start: 2024-01-31 | End: 2024-01-31

## 2024-01-31 RX ORDER — MORPHINE SULFATE 4 MG/ML
4 INJECTION, SOLUTION INTRAMUSCULAR; INTRAVENOUS ONCE
Status: COMPLETED | OUTPATIENT
Start: 2024-01-31 | End: 2024-01-31

## 2024-01-31 RX ORDER — ASCORBIC ACID 500 MG
500 TABLET ORAL DAILY
Qty: 30 TABLET | Refills: 0 | OUTPATIENT
Start: 2024-01-31

## 2024-01-31 RX ORDER — LIDOCAINE 50 MG/G
1 PATCH TOPICAL DAILY
Qty: 5 PATCH | Refills: 0 | Status: SHIPPED | OUTPATIENT
Start: 2024-01-31 | End: 2024-02-05

## 2024-01-31 RX ORDER — TIZANIDINE 4 MG/1
4 TABLET ORAL ONCE
Status: COMPLETED | OUTPATIENT
Start: 2024-01-31 | End: 2024-01-31

## 2024-01-31 RX ORDER — KETOROLAC TROMETHAMINE 30 MG/ML
30 INJECTION, SOLUTION INTRAMUSCULAR; INTRAVENOUS ONCE
Status: DISCONTINUED | OUTPATIENT
Start: 2024-01-31 | End: 2024-01-31

## 2024-01-31 RX ORDER — VITAMIN B COMPLEX
2500 TABLET ORAL DAILY
Qty: 90 TABLET | Refills: 1 | Status: SHIPPED | OUTPATIENT
Start: 2024-01-31

## 2024-01-31 RX ADMIN — TIZANIDINE 4 MG: 4 TABLET ORAL at 00:47

## 2024-01-31 RX ADMIN — MORPHINE SULFATE 4 MG: 4 INJECTION, SOLUTION INTRAMUSCULAR; INTRAVENOUS at 00:47

## 2024-01-31 NOTE — ED PROVIDER NOTES
[facility]  EMERGENCY DEPARTMENT ENCOUNTER        Pt Name: Alfredo Perez  MRN: [id number]  Birthdate 1955  Date of evaluation: 1/30/2024  Provider: Michel Llanes DO  PCP: Starr Morrison MD  Note Started: 12:13 AM EST 1/31/24    CHIEF COMPLAINT       Chief Complaint   Patient presents with    Back Pain       HISTORY OF PRESENT ILLNESS: 1 or more Elements     History from : Patient    Limitations to history : None    Alfredo Perez is a 68 y.o. male with past medical history of chronic back pain, CKD, degenerative disc disease in lumbar region, GERD, hypertension, hypercholesterolemia, radiculopathy, diabetes, and sleep apnea who presents to the emergency department for chief complaint of back pain.  Patient was seen in our emergency department on January 20 as he suffered a fall getting out of his vehicle on January 19.  Patient fell getting out of his pickup truck at a gas station as it was icy.  Patient fell onto his left shoulder and left hip.  Patient denies hitting his head or loss of consciousness at that time.  Patient had unremarkable left shoulder and left hip x-ray during that emergency department visit.  Patient was only having mild back pain at that time.  Patient states that his hip and shoulder pain have improved but now he is having thoracic back pain.  Patient takes Percocet 10/325 that is prescribed by his pain management doctor and then today he took ibuprofen with some improvement of his pain.  Patient states that his pain is worsened with movement.  Patient denies any bowel or bladder incontinence as well as saddle anesthesia.  Patient denies any urinary retention.  Patient denies any paresthesias, extremity weakness, difficulty with ambulation, dizziness, headache, dysuria, urinary changes, fever, chills, abdominal pain, nausea, vomiting, and diarrhea.    Nursing Notes were all reviewed and agreed with or any disagreements were addressed in the

## 2024-02-07 RX ORDER — LANOLIN ALCOHOL/MO/W.PET/CERES
400 CREAM (GRAM) TOPICAL 2 TIMES DAILY
Qty: 180 TABLET | Refills: 1 | Status: SHIPPED | OUTPATIENT
Start: 2024-02-07

## 2024-02-07 RX ORDER — CLONIDINE HYDROCHLORIDE 0.1 MG/1
0.1 TABLET ORAL 2 TIMES DAILY PRN
Qty: 180 TABLET | Refills: 1 | Status: SHIPPED | OUTPATIENT
Start: 2024-02-07

## 2024-02-07 RX ORDER — LISINOPRIL 20 MG/1
20 TABLET ORAL DAILY
Qty: 90 TABLET | Refills: 1 | Status: SHIPPED | OUTPATIENT
Start: 2024-02-07

## 2024-02-07 RX ORDER — ASCORBIC ACID 500 MG
500 TABLET ORAL DAILY
Qty: 90 TABLET | Refills: 1 | Status: SHIPPED | OUTPATIENT
Start: 2024-02-07

## 2024-02-07 RX ORDER — BLOOD-GLUCOSE METER
1 KIT MISCELLANEOUS DAILY
Qty: 1 KIT | Refills: 0 | Status: CANCELLED | OUTPATIENT
Start: 2024-02-07

## 2024-02-07 RX ORDER — FERROUS SULFATE 324(65)MG
324 TABLET, DELAYED RELEASE (ENTERIC COATED) ORAL
Qty: 90 TABLET | Refills: 1 | Status: SHIPPED | OUTPATIENT
Start: 2024-02-07

## 2024-02-07 RX ORDER — LANCETS 30 GAUGE
1 EACH MISCELLANEOUS DAILY
Qty: 300 EACH | Refills: 3 | Status: SHIPPED | OUTPATIENT
Start: 2024-02-07

## 2024-02-07 RX ORDER — TRAZODONE HYDROCHLORIDE 100 MG/1
TABLET ORAL
Qty: 90 TABLET | Refills: 1 | Status: SHIPPED | OUTPATIENT
Start: 2024-02-07

## 2024-02-07 RX ORDER — GLUCOSAMINE HCL/CHONDROITIN SU 500-400 MG
CAPSULE ORAL
Qty: 300 STRIP | Refills: 3 | Status: SHIPPED | OUTPATIENT
Start: 2024-02-07

## 2024-02-07 RX ORDER — ALBUTEROL SULFATE 90 UG/1
2 AEROSOL, METERED RESPIRATORY (INHALATION) EVERY 4 HOURS PRN
Qty: 18 G | Refills: 3 | Status: SHIPPED | OUTPATIENT
Start: 2024-02-07

## 2024-02-07 RX ORDER — GLUCOSAMINE HCL/CHONDROITIN SU 500-400 MG
CAPSULE ORAL
Qty: 300 STRIP | Refills: 3 | Status: CANCELLED | OUTPATIENT
Start: 2024-02-07

## 2024-02-07 RX ORDER — METOLAZONE 2.5 MG/1
2.5 TABLET ORAL PRN
Qty: 45 TABLET | Refills: 3 | Status: SHIPPED | OUTPATIENT
Start: 2024-02-07

## 2024-02-07 RX ORDER — LEVOTHYROXINE SODIUM 88 UG/1
88 TABLET ORAL DAILY
Qty: 90 TABLET | Refills: 1 | Status: SHIPPED | OUTPATIENT
Start: 2024-02-07

## 2024-02-07 RX ORDER — VITAMIN B COMPLEX
2500 TABLET ORAL DAILY
Qty: 90 TABLET | Refills: 1 | Status: SHIPPED | OUTPATIENT
Start: 2024-02-07

## 2024-02-07 RX ORDER — HYDRALAZINE HYDROCHLORIDE 100 MG/1
100 TABLET, FILM COATED ORAL EVERY 8 HOURS SCHEDULED
Qty: 90 TABLET | Refills: 1 | Status: SHIPPED | OUTPATIENT
Start: 2024-02-07

## 2024-02-07 RX ORDER — INSULIN GLARGINE 100 [IU]/ML
45 INJECTION, SOLUTION SUBCUTANEOUS NIGHTLY
Qty: 90 ML | Refills: 3 | Status: CANCELLED | OUTPATIENT
Start: 2024-02-07

## 2024-02-07 RX ORDER — FOLIC ACID 1 MG/1
1 TABLET ORAL DAILY
Qty: 90 TABLET | Refills: 1 | Status: SHIPPED | OUTPATIENT
Start: 2024-02-07

## 2024-02-07 RX ORDER — CARVEDILOL 12.5 MG/1
12.5 TABLET ORAL 2 TIMES DAILY
Qty: 60 TABLET | Refills: 3 | Status: SHIPPED | OUTPATIENT
Start: 2024-02-07

## 2024-02-07 RX ORDER — TAMSULOSIN HYDROCHLORIDE 0.4 MG/1
0.4 CAPSULE ORAL DAILY
Qty: 90 CAPSULE | Refills: 1 | Status: SHIPPED | OUTPATIENT
Start: 2024-02-07

## 2024-02-07 RX ORDER — ALLOPURINOL 100 MG/1
100 TABLET ORAL DAILY
Qty: 90 TABLET | Refills: 1 | Status: CANCELLED | OUTPATIENT
Start: 2024-02-07 | End: 2025-02-06

## 2024-02-07 RX ORDER — FINASTERIDE 5 MG/1
5 TABLET, FILM COATED ORAL DAILY
Qty: 90 TABLET | Refills: 1 | Status: SHIPPED | OUTPATIENT
Start: 2024-02-07

## 2024-02-07 RX ORDER — INSULIN LISPRO 100 [IU]/ML
5 INJECTION, SOLUTION INTRAVENOUS; SUBCUTANEOUS
Qty: 10 ML | Refills: 1 | Status: CANCELLED | OUTPATIENT
Start: 2024-02-07

## 2024-02-14 ENCOUNTER — TELEPHONE (OUTPATIENT)
Dept: PRIMARY CARE CLINIC | Age: 69
End: 2024-02-14

## 2024-02-14 NOTE — TELEPHONE ENCOUNTER
Pt called requesting order for hospital therapy (physical therapy I'm guessing) he left vm I tried calling him back no answer left vm to call back

## 2024-02-20 ENCOUNTER — TELEPHONE (OUTPATIENT)
Dept: PRIMARY CARE CLINIC | Age: 69
End: 2024-02-20

## 2024-02-20 DIAGNOSIS — M25.512 ACUTE PAIN OF LEFT SHOULDER: Primary | ICD-10-CM

## 2024-02-20 NOTE — TELEPHONE ENCOUNTER
Patient said shoulder still bothering him from where he fell, wants to get a referral for PT at hospital...

## 2024-03-05 ENCOUNTER — HOSPITAL ENCOUNTER (OUTPATIENT)
Dept: PHYSICAL THERAPY | Facility: HOSPITAL | Age: 69
Setting detail: THERAPIES SERIES
Discharge: HOME | End: 2024-03-05
Payer: MEDICARE

## 2024-03-05 DIAGNOSIS — M51.36 DDD (DEGENERATIVE DISC DISEASE), LUMBAR: Primary | Chronic | ICD-10-CM

## 2024-03-05 DIAGNOSIS — R53.81 DECLINING FUNCTIONAL STATUS: ICD-10-CM

## 2024-03-05 PROCEDURE — 97161 PT EVAL LOW COMPLEX 20 MIN: CPT

## 2024-03-05 PROCEDURE — 97110 THERAPEUTIC EXERCISES: CPT

## 2024-03-05 ASSESSMENT — PAIN SCALES - GENERAL: PAINLEVEL_OUTOF10: 4

## 2024-03-05 ASSESSMENT — PAIN DESCRIPTION - LOCATION: LOCATION: SHOULDER

## 2024-03-05 ASSESSMENT — PAIN DESCRIPTION - ORIENTATION: ORIENTATION: LEFT

## 2024-03-05 ASSESSMENT — PAIN DESCRIPTION - PAIN TYPE: TYPE: ACUTE PAIN

## 2024-03-06 NOTE — PROGRESS NOTES
gabapentin (NEURONTIN) 800 MG tablet, Take 1 tablet by mouth 3 times daily., Disp: , Rfl:     meclizine (ANTIVERT) 25 MG CHEW, Take 1 tablet by mouth 3 times daily as needed (dizziness), Disp: , Rfl:     oxyCODONE-acetaminophen (PERCOCET)  MG per tablet, Take 1 tablet by mouth every morning., Disp: , Rfl:     glucose monitoring (FREESTYLE FREEDOM) kit, 1 kit by Does not apply route daily Sub covered brand BID and PRN Dx: DM2, Disp: 1 kit, Rfl: 0    Insulin Pen Needle (UNIFINE PENTIPS) 31G X 6 MM MISC, USE TO INJECT INSULIN EVERY DAY, Disp: 100 each, Rfl: 5    omeprazole (PRILOSEC) 40 MG delayed release capsule, Take 1 capsule by mouth daily. For stomach, Disp: 90 capsule, Rfl: 3    pravastatin (PRAVACHOL) 80 MG tablet, TAKE 1 TABLET BY MOUTH EVERY EVENING for cholesterol, Disp: 90 tablet, Rfl: 3    Needles & Syringes MISC, 1 each by Does not apply route daily u-100 insulin syringe, Disp: 50 each, Rfl: 5    FREESTYLE LANCETS MISC, TEST TWO TIMES A DAY, Disp: 100 each, Rfl: 5    FREESTYLE LITE strip, TEST BLOOD SUGAR TWO TIMES A DAY, Disp: 100 strip, Rfl: 5    Respiratory Therapy Supplies PREMA, by Does not apply route., Disp: 1 Device, Rfl: 0  Allergies: Atenolol and Meloxicam      SUBJECTIVE EXAMINATION     Subjective History:    Subjective: Pt reports he fell 1/19/24 on snow and landed on his L side resulting in his back and shoulder hurting. He reports his shoulder is hurting him and his back pain comes and goes. Pt reports he was referred to PT services. Pt reports he cant use his LUE anymore to drive and he has to use a shower brush with a handle because of loss of ROM. He also reports he is not able to use his LUE to hold onto his granddaughter anymore and is unable to take care of his chicken coop.  Additional Pertinent Hx (if applicable):     Prior diagnostic testing:: X-ray, CT Scan  Previous treatments prior to current episode?: Injections, Medications (Pt used a sling on his L shoulder for 3 weeks.)

## 2024-03-07 ENCOUNTER — HOSPITAL ENCOUNTER (OUTPATIENT)
Dept: PHYSICAL THERAPY | Facility: HOSPITAL | Age: 69
Setting detail: THERAPIES SERIES
Discharge: HOME | End: 2024-03-07
Payer: MEDICARE

## 2024-03-07 PROCEDURE — 97140 MANUAL THERAPY 1/> REGIONS: CPT

## 2024-03-07 PROCEDURE — 97110 THERAPEUTIC EXERCISES: CPT

## 2024-03-07 NOTE — FLOWSHEET NOTE
Physical Therapy Daily Treatment Note   Date:  3/7/2024    TIme In: 1400                     Time Out: 1448    Patient Name:  Alfredo Perez    :  1955  MRN: [id number]    Restrictions/Precautions:    Pertinent Medical History:  Medical/Treatment Diagnosis Information:  Left shoulder pain [M25.512]     Insurance/Certification information:  Payor: [employer] MEDICARE / Plan: [employer] CHOICE-PPO MEDICARE / Product Type: *No Product type* /   Physician Information:  Starr Morrison MD  Plan of care signed (Y/N):    Visit# / total visits:     2 /    G-Code (if applicable):      Date / Visit # G-Code Applied:         Progress Note: [x]  Yes  []  No  Next due by: 24     Pain level: 4-5/10    Subjective: Pt reports he has been performing his HEP at home and seems to be helping      Objective:  Observation:   Test measurements:    Palpation:    Exercises:  Exercise Resistance/Repetitions Date performed   Pulleys  3'abd 3' flex  7   Shoulder Abd/Flex wall slide  3x10 each 7   Doorway pec stretch  3x45\" 7   Shoulder IR  3x10 GTB L 7   Shoulder ER  3x10 GTB L 7   Rows  3x10 GTB 7   Flashers  2x10 2\" 7   supine shoulder flexion with dowel  3x10  7   Shoulder flexion  2x10 OTB L Next tx   Shoulder abduction 2x10 OTB L Next tx   Low trap setting  2x10 2\"  Next tx           Other Therapeutic Activities:      Manual Treatments:  Gentle PROM into all planes, grade II-III joint mobilizations, shoulder distraction x15'    Modalities:  Ice x8'      Timed Code Treatment Minutes:  39      Total Treatment Minutes:  48    Treatment/Activity Tolerance:  [x] Patient tolerated treatment well [] Patient limited by fatigue  [] Patient limited by pain  [] Patient limited by other medical complications  [x] Other: Pt able to perform all exercises with min cues for eccentric control and full ROM. Pt responded well to manual tx with gradual ROM increase. Pt told to avoid impingement position of the shoulder, d/t noticeable crepitus. Pt in

## 2024-03-11 ENCOUNTER — HOSPITAL ENCOUNTER (OUTPATIENT)
Dept: PHYSICAL THERAPY | Facility: HOSPITAL | Age: 69
Setting detail: THERAPIES SERIES
Discharge: HOME | End: 2024-03-11
Payer: MEDICARE

## 2024-03-11 PROCEDURE — 97110 THERAPEUTIC EXERCISES: CPT

## 2024-03-11 PROCEDURE — 97140 MANUAL THERAPY 1/> REGIONS: CPT

## 2024-03-11 NOTE — FLOWSHEET NOTE
Decreased pain post tx    Pain after treatment:    2/10    Prognosis: [x] Good [] Fair  [] Poor    Patient Requires Follow-up: [x] Yes  [] No    Plan:   [x] Continue per plan of care [] Alter current plan (see comments)  [] Plan of care initiated [] Hold pending MD visit [] Discharge    Plan for Next Session:        Electronically signed by:  Carlyle Lopez PT

## 2024-03-13 ENCOUNTER — HOSPITAL ENCOUNTER (OUTPATIENT)
Dept: PHYSICAL THERAPY | Facility: HOSPITAL | Age: 69
Setting detail: THERAPIES SERIES
Discharge: HOME | End: 2024-03-13
Payer: MEDICARE

## 2024-03-13 PROCEDURE — 97140 MANUAL THERAPY 1/> REGIONS: CPT

## 2024-03-13 PROCEDURE — 97110 THERAPEUTIC EXERCISES: CPT

## 2024-03-19 ENCOUNTER — OFFICE VISIT (OUTPATIENT)
Dept: PRIMARY CARE CLINIC | Age: 69
End: 2024-03-19
Payer: MEDICARE

## 2024-03-19 VITALS
WEIGHT: 315 LBS | DIASTOLIC BLOOD PRESSURE: 62 MMHG | RESPIRATION RATE: 18 BRPM | SYSTOLIC BLOOD PRESSURE: 122 MMHG | OXYGEN SATURATION: 97 % | BODY MASS INDEX: 48.69 KG/M2 | HEART RATE: 67 BPM

## 2024-03-19 DIAGNOSIS — E03.9 HYPOTHYROIDISM, UNSPECIFIED TYPE: ICD-10-CM

## 2024-03-19 DIAGNOSIS — Z79.4 TYPE 2 DIABETES MELLITUS WITH STAGE 3A CHRONIC KIDNEY DISEASE, WITH LONG-TERM CURRENT USE OF INSULIN (HCC): Primary | ICD-10-CM

## 2024-03-19 DIAGNOSIS — I10 PRIMARY HYPERTENSION: ICD-10-CM

## 2024-03-19 DIAGNOSIS — G47.33 OBSTRUCTIVE SLEEP APNEA SYNDROME: ICD-10-CM

## 2024-03-19 DIAGNOSIS — E11.22 TYPE 2 DIABETES MELLITUS WITH STAGE 3A CHRONIC KIDNEY DISEASE, WITH LONG-TERM CURRENT USE OF INSULIN (HCC): Primary | ICD-10-CM

## 2024-03-19 DIAGNOSIS — E78.5 HYPERLIPIDEMIA, UNSPECIFIED HYPERLIPIDEMIA TYPE: ICD-10-CM

## 2024-03-19 DIAGNOSIS — N18.31 TYPE 2 DIABETES MELLITUS WITH STAGE 3A CHRONIC KIDNEY DISEASE, WITH LONG-TERM CURRENT USE OF INSULIN (HCC): Primary | ICD-10-CM

## 2024-03-19 LAB — HBA1C MFR BLD: 6.4 %

## 2024-03-19 PROCEDURE — G8417 CALC BMI ABV UP PARAM F/U: HCPCS | Performed by: INTERNAL MEDICINE

## 2024-03-19 PROCEDURE — 99213 OFFICE O/P EST LOW 20 MIN: CPT | Performed by: INTERNAL MEDICINE

## 2024-03-19 PROCEDURE — 1036F TOBACCO NON-USER: CPT | Performed by: INTERNAL MEDICINE

## 2024-03-19 PROCEDURE — 1123F ACP DISCUSS/DSCN MKR DOCD: CPT | Performed by: INTERNAL MEDICINE

## 2024-03-19 PROCEDURE — 3044F HG A1C LEVEL LT 7.0%: CPT | Performed by: INTERNAL MEDICINE

## 2024-03-19 PROCEDURE — 3078F DIAST BP <80 MM HG: CPT | Performed by: INTERNAL MEDICINE

## 2024-03-19 PROCEDURE — 83036 HEMOGLOBIN GLYCOSYLATED A1C: CPT | Performed by: INTERNAL MEDICINE

## 2024-03-19 PROCEDURE — 3074F SYST BP LT 130 MM HG: CPT | Performed by: INTERNAL MEDICINE

## 2024-03-19 PROCEDURE — G8427 DOCREV CUR MEDS BY ELIG CLIN: HCPCS | Performed by: INTERNAL MEDICINE

## 2024-03-19 PROCEDURE — 2022F DILAT RTA XM EVC RTNOPTHY: CPT | Performed by: INTERNAL MEDICINE

## 2024-03-19 PROCEDURE — 3017F COLORECTAL CA SCREEN DOC REV: CPT | Performed by: INTERNAL MEDICINE

## 2024-03-19 PROCEDURE — G8484 FLU IMMUNIZE NO ADMIN: HCPCS | Performed by: INTERNAL MEDICINE

## 2024-03-19 RX ORDER — LIDOCAINE 50 MG/G
PATCH TOPICAL
COMMUNITY
Start: 2024-02-08

## 2024-03-19 RX ORDER — SEMAGLUTIDE 1.34 MG/ML
1 INJECTION, SOLUTION SUBCUTANEOUS WEEKLY
Qty: 3 ML | Refills: 3 | Status: SHIPPED | OUTPATIENT
Start: 2024-03-19 | End: 2024-03-19 | Stop reason: SDUPTHER

## 2024-03-19 RX ORDER — SEMAGLUTIDE 1.34 MG/ML
1 INJECTION, SOLUTION SUBCUTANEOUS WEEKLY
Qty: 3 ML | Refills: 3 | Status: SHIPPED | OUTPATIENT
Start: 2024-03-19

## 2024-03-19 ASSESSMENT — ENCOUNTER SYMPTOMS
COUGH: 0
WHEEZING: 0
NAUSEA: 0
VOMITING: 0
BACK PAIN: 1
ABDOMINAL PAIN: 0
EYE DISCHARGE: 0
SINUS PRESSURE: 0
SHORTNESS OF BREATH: 0

## 2024-03-19 NOTE — PROGRESS NOTES
Chief Complaint   Patient presents with    Diabetes    Hypertension    Hyperlipidemia     Fs   Lantus 45 nightly  Ozempic weekly    Have you seen any other physician or provider since your last visit yes - nephrology, PT , pain clinic     Have you had any other diagnostic tests since your last visit? no    Have you changed or stopped any medications since your last visit? Not taking humalog states he didn't know he had it  
   omeprazole (PRILOSEC) 40 MG delayed release capsule Take 1 capsule by mouth daily. For stomach 90 capsule 3    pravastatin (PRAVACHOL) 80 MG tablet TAKE 1 TABLET BY MOUTH EVERY EVENING for cholesterol 90 tablet 3    Needles & Syringes MISC 1 each by Does not apply route daily u-100 insulin syringe 50 each 5    FREESTYLE LANCETS MISC TEST TWO TIMES A  each 5    FREESTYLE LITE strip TEST BLOOD SUGAR TWO TIMES A  strip 5    Respiratory Therapy Supplies PREMA by Does not apply route. 1 Device 0        Review of Systems   Constitutional:  Negative for chills, fatigue and fever.   HENT:  Negative for congestion, ear pain and sinus pressure.    Eyes:  Negative for discharge and visual disturbance.   Respiratory:  Negative for cough, shortness of breath and wheezing.    Cardiovascular:  Negative for chest pain and palpitations.   Gastrointestinal:  Negative for abdominal pain, nausea and vomiting.   Endocrine: Negative for cold intolerance and heat intolerance.   Genitourinary:  Negative for dysuria, frequency and urgency.   Musculoskeletal:  Positive for back pain. Negative for arthralgias.        Left shoulder pain    Skin:  Negative for pallor and rash.   Allergic/Immunologic: Negative for food allergies and immunocompromised state.   Neurological:  Negative for dizziness, numbness and headaches.   Hematological:  Negative for adenopathy. Does not bruise/bleed easily.   Psychiatric/Behavioral:  Negative for agitation and sleep disturbance. The patient is not nervous/anxious.        Past Medical History:   Diagnosis Date    Chronic back pain     feet,hands,legs,knees    Chronic kidney disease     Stage III    DDD (degenerative disc disease), lumbar     GERD (gastroesophageal reflux disease)     Hypercholesteremia     Hypertension     Left lumbar radiculopathy     Obesity     Type II or unspecified type diabetes mellitus without mention of complication, not stated as uncontrolled 2011    Unspecified sleep

## 2024-03-20 ENCOUNTER — OFFICE VISIT (OUTPATIENT)
Dept: PRIMARY CARE CLINIC | Age: 69
End: 2024-03-20
Payer: MEDICARE

## 2024-03-20 VITALS
SYSTOLIC BLOOD PRESSURE: 130 MMHG | DIASTOLIC BLOOD PRESSURE: 62 MMHG | HEART RATE: 72 BPM | WEIGHT: 315 LBS | BODY MASS INDEX: 48.82 KG/M2 | RESPIRATION RATE: 18 BRPM | OXYGEN SATURATION: 93 %

## 2024-03-20 DIAGNOSIS — M25.512 CHRONIC LEFT SHOULDER PAIN: Primary | ICD-10-CM

## 2024-03-20 DIAGNOSIS — G89.29 CHRONIC LEFT SHOULDER PAIN: Primary | ICD-10-CM

## 2024-03-20 PROCEDURE — 1123F ACP DISCUSS/DSCN MKR DOCD: CPT | Performed by: INTERNAL MEDICINE

## 2024-03-20 PROCEDURE — G8484 FLU IMMUNIZE NO ADMIN: HCPCS | Performed by: INTERNAL MEDICINE

## 2024-03-20 PROCEDURE — 1036F TOBACCO NON-USER: CPT | Performed by: INTERNAL MEDICINE

## 2024-03-20 PROCEDURE — G8417 CALC BMI ABV UP PARAM F/U: HCPCS | Performed by: INTERNAL MEDICINE

## 2024-03-20 PROCEDURE — G8427 DOCREV CUR MEDS BY ELIG CLIN: HCPCS | Performed by: INTERNAL MEDICINE

## 2024-03-20 PROCEDURE — 3075F SYST BP GE 130 - 139MM HG: CPT | Performed by: INTERNAL MEDICINE

## 2024-03-20 PROCEDURE — 3017F COLORECTAL CA SCREEN DOC REV: CPT | Performed by: INTERNAL MEDICINE

## 2024-03-20 PROCEDURE — 99213 OFFICE O/P EST LOW 20 MIN: CPT | Performed by: INTERNAL MEDICINE

## 2024-03-20 PROCEDURE — 3078F DIAST BP <80 MM HG: CPT | Performed by: INTERNAL MEDICINE

## 2024-03-20 ASSESSMENT — ENCOUNTER SYMPTOMS
ABDOMINAL PAIN: 0
COUGH: 0
WHEEZING: 0
VOMITING: 0
SINUS PRESSURE: 0
EYE DISCHARGE: 0
SHORTNESS OF BREATH: 0
BACK PAIN: 0
NAUSEA: 0

## 2024-03-20 NOTE — PROGRESS NOTES
normal.         Lab Results   Component Value Date/Time     01/16/2024 09:15 AM    K 4.2 01/16/2024 09:15 AM    K 3.9 12/16/2023 05:15 AM     01/16/2024 09:15 AM    CO2 27 01/16/2024 09:15 AM    GLUCOSE 219 01/16/2024 09:15 AM    BUN 19 01/16/2024 09:15 AM    CREATININE 1.5 01/16/2024 09:15 AM    CALCIUM 9.1 01/16/2024 09:15 AM    PROT 6.5 01/16/2024 09:15 AM    LABALBU 4.1 01/16/2024 09:15 AM    LABALBU 4.4 08/03/2011 12:00 AM    BILITOT 0.3 01/16/2024 09:15 AM    BILITOT 0.5 08/03/2011 12:00 AM    ALT 16 01/16/2024 09:15 AM    AST 14 01/16/2024 09:15 AM       Hemoglobin A1C (%)   Date Value   03/19/2024 6.4     LDL Calculated (mg/dL)   Date Value   04/22/2021 73     LDL Direct (mg/dL)   Date Value   12/13/2016 93         Lab Results   Component Value Date/Time    WBC 9.7 01/16/2024 09:15 AM    NEUTROABS 11.4 12/15/2023 05:22 AM    HGB 12.5 01/16/2024 09:15 AM    HCT 37.2 01/16/2024 09:15 AM    MCV 89.6 01/16/2024 09:15 AM     01/16/2024 09:15 AM       Lab Results   Component Value Date    TSH 4.150 04/22/2021         ASSESSMENT/PLAN:     1. Chronic left shoulder pain  Symptoms persist for the past 2 months.  Reviewed x-ray which was done during his ER visit.  Patient reported he was given a steroid injection in the shoulder area while he was in the emergency room and he did physical therapy without response.  Will proceed with MRI. Further recommendation based on test results.    Tylenol, gabapentin, Percocet per pain clinic.  Avoid NSAIDs related to his renal function.    - MRI SHOULDER LEFT WO CONTRAST; Future       I, Inessa Smith LPN am scribing for and in the presence of Starr Morrison MD on this date 3/20/2024 at 11:20 AM.    I, Dr. Starr Morrison, personally performed the services described in the documentation as scribed by Inessa Smith LPN, in my presence and it is both accurate and complete.

## 2024-03-28 DIAGNOSIS — M25.512 CHRONIC LEFT SHOULDER PAIN: ICD-10-CM

## 2024-03-28 DIAGNOSIS — G89.29 CHRONIC LEFT SHOULDER PAIN: ICD-10-CM

## 2024-04-10 DIAGNOSIS — M25.512 CHRONIC LEFT SHOULDER PAIN: Primary | ICD-10-CM

## 2024-04-10 DIAGNOSIS — G89.29 CHRONIC LEFT SHOULDER PAIN: Primary | ICD-10-CM

## 2024-04-24 ENCOUNTER — OFFICE VISIT (OUTPATIENT)
Dept: ORTHOPEDIC SURGERY | Facility: CLINIC | Age: 69
End: 2024-04-24
Payer: MEDICARE

## 2024-04-24 VITALS — WEIGHT: 315 LBS | TEMPERATURE: 97.6 F | BODY MASS INDEX: 42.66 KG/M2 | HEIGHT: 72 IN

## 2024-04-24 DIAGNOSIS — M19.012 ARTHROSIS OF LEFT ACROMIOCLAVICULAR JOINT: ICD-10-CM

## 2024-04-24 DIAGNOSIS — M75.22 BICEPS TENDINITIS OF LEFT UPPER EXTREMITY: ICD-10-CM

## 2024-04-24 DIAGNOSIS — M75.42 SUBACROMIAL IMPINGEMENT OF LEFT SHOULDER: ICD-10-CM

## 2024-04-24 DIAGNOSIS — M67.912 TENDINOPATHY OF LEFT ROTATOR CUFF: Primary | ICD-10-CM

## 2024-04-24 DIAGNOSIS — M19.012 ARTHRITIS OF LEFT GLENOHUMERAL JOINT: ICD-10-CM

## 2024-04-24 PROCEDURE — 1160F RVW MEDS BY RX/DR IN RCRD: CPT | Performed by: STUDENT IN AN ORGANIZED HEALTH CARE EDUCATION/TRAINING PROGRAM

## 2024-04-24 PROCEDURE — 1159F MED LIST DOCD IN RCRD: CPT | Performed by: STUDENT IN AN ORGANIZED HEALTH CARE EDUCATION/TRAINING PROGRAM

## 2024-04-24 PROCEDURE — 99204 OFFICE O/P NEW MOD 45 MIN: CPT | Performed by: STUDENT IN AN ORGANIZED HEALTH CARE EDUCATION/TRAINING PROGRAM

## 2024-04-24 PROCEDURE — 20610 DRAIN/INJ JOINT/BURSA W/O US: CPT | Performed by: STUDENT IN AN ORGANIZED HEALTH CARE EDUCATION/TRAINING PROGRAM

## 2024-04-24 RX ORDER — LIDOCAINE 50 MG/G
PATCH TOPICAL
COMMUNITY
Start: 2024-02-08

## 2024-04-24 RX ORDER — FINASTERIDE 5 MG/1
1 TABLET, FILM COATED ORAL DAILY
COMMUNITY
Start: 2024-02-07

## 2024-04-24 RX ORDER — TIZANIDINE 4 MG/1
4 TABLET ORAL
COMMUNITY
Start: 2024-01-31

## 2024-04-24 RX ORDER — TRAZODONE HYDROCHLORIDE 100 MG/1
TABLET ORAL
COMMUNITY
Start: 2024-02-07

## 2024-04-24 RX ORDER — GABAPENTIN 800 MG/1
TABLET ORAL
COMMUNITY

## 2024-04-24 RX ORDER — HYDRALAZINE HYDROCHLORIDE 100 MG/1
100 TABLET, FILM COATED ORAL EVERY 8 HOURS
COMMUNITY

## 2024-04-24 RX ORDER — TAMSULOSIN HYDROCHLORIDE 0.4 MG/1
1 CAPSULE ORAL DAILY
COMMUNITY
Start: 2024-02-07

## 2024-04-24 RX ORDER — CLONIDINE HYDROCHLORIDE 0.1 MG/1
0.1 TABLET ORAL
COMMUNITY
Start: 2024-02-07

## 2024-04-24 RX ORDER — ALBUTEROL SULFATE 90 UG/1
2 AEROSOL, METERED RESPIRATORY (INHALATION)
COMMUNITY
Start: 2024-02-07

## 2024-04-24 RX ORDER — MECLIZINE HYDROCHLORIDE 25 MG/1
25 TABLET ORAL
COMMUNITY

## 2024-04-24 RX ORDER — METHYLPREDNISOLONE ACETATE 40 MG/ML
40 INJECTION, SUSPENSION INTRA-ARTICULAR; INTRALESIONAL; INTRAMUSCULAR; SOFT TISSUE
Status: COMPLETED | OUTPATIENT
Start: 2024-04-24 | End: 2024-04-24

## 2024-04-24 RX ORDER — SEMAGLUTIDE 1.34 MG/ML
1 INJECTION, SOLUTION SUBCUTANEOUS
COMMUNITY
Start: 2024-03-19

## 2024-04-24 RX ORDER — LIDOCAINE HYDROCHLORIDE 20 MG/ML
2 INJECTION, SOLUTION INFILTRATION; PERINEURAL
Status: COMPLETED | OUTPATIENT
Start: 2024-04-24 | End: 2024-04-24

## 2024-04-24 RX ORDER — OXYCODONE AND ACETAMINOPHEN 10; 325 MG/1; MG/1
TABLET ORAL
COMMUNITY

## 2024-04-24 RX ADMIN — METHYLPREDNISOLONE ACETATE 40 MG: 40 INJECTION, SUSPENSION INTRA-ARTICULAR; INTRALESIONAL; INTRAMUSCULAR; SOFT TISSUE at 15:42

## 2024-04-24 RX ADMIN — LIDOCAINE HYDROCHLORIDE 2 ML: 20 INJECTION, SOLUTION INFILTRATION; PERINEURAL at 15:42

## 2024-04-24 NOTE — PROGRESS NOTES
Office Note     Name: Thomas Briceño    : 1955     MRN: [id number]     Chief Complaint  Pain of the Left Shoulder (States he slipped on ice 24, he has had pain in his shoulder since.)    Subjective     History of Present Illness:  Thomas Briceño is a 69 y.o. male presenting today for evaluation of chronic left shoulder pain ongoing for approximately 4 months following an injury where he slipped on the ice and fell onto the affected shoulder.  Patient states over the past 3 months he has had physical therapy which did not improve his symptoms.  An MRI of his left shoulder was ordered by his PCP which revealed early arthritic changes of the glenohumeral joint, tendinopathy of the rotator cuff, biceps tendinitis as well as subacromial impingement and AC joint arthrosis.  Patient states that his symptoms tend to wax and wane however they have been acutely worse over the past 3 to 4 weeks.  States the pain is felt in the anterior lateral shoulder and exacerbated by overhead activities pushing pulling and internal rotation.  He denies any significant pain at the AC joint.  He has also tried ice and heat and diclofenac gel with little improvement.  He denies any paresthesias.  He denies any previous injuries or surgeries to the affected area.    Review of Systems   Constitutional:  Negative for fever.   HENT:  Negative for dental problem and voice change.    Eyes:  Negative for visual disturbance.   Respiratory:  Negative for shortness of breath.    Cardiovascular:  Negative for chest pain.   Gastrointestinal:  Negative for abdominal pain.   Genitourinary:  Negative for dysuria.   Musculoskeletal:  Positive for arthralgias (left shoulder). Negative for gait problem and joint swelling.   Skin:  Negative for rash.   Neurological:  Negative for speech difficulty.   Hematological:  Does not bruise/bleed easily.   Psychiatric/Behavioral:  Negative for confusion.         Past Medical History:   Diagnosis Date    Acid  reflux     Anxiety     Arthritis     Chronic kidney disease, stage 3 (moderate)     Depression     Diabetes     Exertional shortness of breath 5/20/2018    Gout     Hyperlipidemia     Hypertension     Hypothyroidism     Kidney disorder         No past surgical history on file.    Family History   Problem Relation Age of Onset    Hypertension Father     Colon cancer Father     Breast cancer Sister     Heart attack Brother     Heart attack Brother        Social History     Socioeconomic History    Marital status:    Tobacco Use    Smoking status: Never     Passive exposure: Never    Smokeless tobacco: Never   Vaping Use    Vaping status: Never Used   Substance and Sexual Activity    Alcohol use: No    Drug use: No    Sexual activity: Defer         Current Outpatient Medications:     albuterol sulfate  (90 Base) MCG/ACT inhaler, Inhale 2 puffs., Disp: , Rfl:     cloNIDine (CATAPRES) 0.1 MG tablet, Take 1 tablet by mouth., Disp: , Rfl:     finasteride (PROSCAR) 5 MG tablet, Take 1 tablet by mouth Daily., Disp: , Rfl:     lidocaine (LIDODERM) 5 %, , Disp: , Rfl:     Ozempic, 1 MG/DOSE, 4 MG/3ML solution pen-injector, Inject 1 mg under the skin into the appropriate area as directed., Disp: , Rfl:     tamsulosin (FLOMAX) 0.4 MG capsule 24 hr capsule, Take 1 capsule by mouth Daily., Disp: , Rfl:     tiZANidine (ZANAFLEX) 4 MG tablet, Take 1 tablet by mouth., Disp: , Rfl:     traZODone (DESYREL) 100 MG tablet, TAKE ONE TABLET BY MOUTH NIGHTLY for sleep, Disp: , Rfl:     allopurinol (ZYLOPRIM) 100 MG tablet, Take 100 mg by mouth Daily., Disp: , Rfl: 5    amitriptyline (ELAVIL) 50 MG tablet, Take 50 mg by mouth At Night As Needed. 2 tabs at night, Disp: , Rfl:     carvedilol (COREG) 12.5 MG tablet, Take 12.5 mg by mouth 2 (Two) Times a Day., Disp: , Rfl: 5    diclofenac (VOLTAREN) 1 % gel gel, Apply 4 g topically As Needed., Disp: , Rfl: 5    gabapentin (NEURONTIN) 800 MG tablet, TAKE 1 TABLET 3 TIMES A DAY BY  "ORAL ROUTE FOR 30 DAYS., Disp: , Rfl:     glipiZIDE (GLUCOTROL) 10 MG tablet, Take 2 tablets by mouth 2 (Two) Times a Day., Disp: , Rfl: 5    hydrALAZINE (APRESOLINE) 100 MG tablet, Take 1 tablet by mouth Every 8 (Eight) Hours., Disp: , Rfl:     LANTUS 100 UNIT/ML injection, Inject 40-75 Units under the skin Daily As Needed., Disp: , Rfl: 5    levothyroxine (SYNTHROID, LEVOTHROID) 75 MCG tablet, Take 75 mcg by mouth Daily., Disp: , Rfl: 5    lisinopril (PRINIVIL,ZESTRIL) 40 MG tablet, Take 40 mg by mouth Daily., Disp: , Rfl: 5    meclizine (ANTIVERT) 25 MG tablet, Take 1 tablet by mouth., Disp: , Rfl:     metOLazone (ZAROXOLYN) 2.5 MG tablet, Take 2.5 mg by mouth Every Other Day., Disp: , Rfl:     omeprazole (priLOSEC) 40 MG capsule, Take 40 mg by mouth Daily., Disp: , Rfl: 5    oxyCODONE-acetaminophen (PERCOCET)  MG per tablet, TAKE TWO TABLETS BY MOUTH EVERY DAY AS NEEDED FOR PAIN, Disp: , Rfl:     pravastatin (PRAVACHOL) 80 MG tablet, Take 80 mg by mouth Daily., Disp: , Rfl:     TRADJENTA 5 MG tablet tablet, Take 5 mg by mouth Daily., Disp: , Rfl: 5    Allergies   Allergen Reactions    Atenolol Other (See Comments)     dizzy    Meloxicam Dizziness     Dizzy, unable to focus           Objective   Temp 97.6 °F (36.4 °C)   Ht 182.9 cm (72.01\")   Wt (!) 148 kg (326 lb)   BMI 44.20 kg/m²    Class 3 Severe Obesity (BMI >=40). Obesity-related health conditions include the following: hypertension, coronary heart disease, diabetes mellitus, GERD, and osteoarthritis. Obesity is improving with treatment. BMI is is above average; BMI management plan is completed. We discussed low calorie, low carb based diet program, portion control, increasing exercise, and joining a fitness center or start home based exercise program.       Physical Exam  Left Shoulder Exam     Tenderness   The patient is experiencing tenderness in the biceps tendon and acromion.    Range of Motion   Active abduction:  90   Passive abduction:  120 "   Extension:  30   External rotation:  70   Forward flexion:  90   Internal rotation 0 degrees:  L3     Muscle Strength   Abduction: 3/5   Internal rotation: 4/5   External rotation: 3/5   Supraspinatus: 3/5   Subscapularis: 4/5   Biceps: 4/5     Tests   Palacios test: positive  Impingement: positive  Drop arm: negative    Other   Erythema: absent  Sensation: normal  Pulse: present            Extremity DVT signs are negative by clinical screen.     Independent Review of Radiographic Studies:    No new imaging done today.    Large Joint Arthrocentesis: L knee  Date/Time: 4/24/2024 3:42 PM  Consent given by: patient  Site marked: site marked  Timeout: Immediately prior to procedure a time out was called to verify the correct patient, procedure, equipment, support staff and site/side marked as required   Supporting Documentation  Indications: pain   Procedure Details  Location: knee - L knee  Preparation: Patient was prepped and draped in the usual sterile fashion  Needle size: 22 G  Approach: anterolateral  Medications administered: 40 mg methylPREDNISolone acetate 40 MG/ML; 2 mL lidocaine 2%  Patient tolerance: patient tolerated the procedure well with no immediate complications        Assessment and Plan   Diagnoses and all orders for this visit:    1. Tendinopathy of left rotator cuff (Primary)    2. Subacromial impingement of left shoulder    3. Arthritis of left glenohumeral joint    4. Arthrosis of left acromioclavicular joint    5. Biceps tendinitis of left upper extremity    Other orders  -     Large Joint Arthrocentesis: L knee       Discussion of orthopedic goals  Orthopedic activities reviewed and patient expressed appreciation  Regular exercise as tolerated  Risk, benefits, and merits of treatment alternatives reviewed with the patient and questions answered  Patient guided on mobility and conditioning exercises  Ice, heat, and/or modalities as beneficial  Call or notify for any adverse effect from  injection therapy  Reduced physical activity as appropriate and avoid offending activity  Weight bearing parameters reviewed  Counseling on diet, nutrition, fitness exercise, and weight reduction goals    Recommendations/Plan:  Exercise, medications, injections, other patient advice, and return appointment as noted.  Patient is encouraged to call or return for any issues or concerns.    Gave subacromial cortisone injection into the left shoulder today for symptomatic relief.  Advised continuing ice and heat and home exercise plan as well as Tylenol as needed.  Also recommended over-the-counter muscle creams.  Advised patient to follow-up with me in 3 months if symptoms improved with injection or 6 weeks if symptoms do not improve.    Return in about 6 weeks (around 6/5/2024), or if symptoms worsen or fail to improve.  Patient agreeable to call or return sooner for any concerns.

## 2024-04-25 RX ORDER — HYDRALAZINE HYDROCHLORIDE 100 MG/1
100 TABLET, FILM COATED ORAL EVERY 8 HOURS SCHEDULED
Qty: 90 TABLET | Refills: 3 | Status: SHIPPED | OUTPATIENT
Start: 2024-04-25

## 2024-04-26 RX ORDER — FINASTERIDE 5 MG/1
5 TABLET, FILM COATED ORAL DAILY
Qty: 90 TABLET | Refills: 1 | Status: SHIPPED | OUTPATIENT
Start: 2024-04-26

## 2024-04-26 RX ORDER — TAMSULOSIN HYDROCHLORIDE 0.4 MG/1
0.4 CAPSULE ORAL DAILY
Qty: 90 CAPSULE | Refills: 1 | Status: SHIPPED | OUTPATIENT
Start: 2024-04-26

## 2024-05-01 ENCOUNTER — COMMUNITY OUTREACH (OUTPATIENT)
Dept: PRIMARY CARE CLINIC | Age: 69
End: 2024-05-01

## 2024-05-09 RX ORDER — CARVEDILOL 12.5 MG/1
12.5 TABLET ORAL 2 TIMES DAILY
Qty: 180 TABLET | Refills: 1 | Status: SHIPPED | OUTPATIENT
Start: 2024-05-09

## 2024-05-15 ENCOUNTER — HOSPITAL ENCOUNTER (OUTPATIENT)
Facility: HOSPITAL | Age: 69
Setting detail: OBSERVATION
Discharge: HOME | End: 2024-05-16
Attending: INTERNAL MEDICINE | Admitting: INTERNAL MEDICINE
Payer: MEDICARE

## 2024-05-15 ENCOUNTER — OFFICE VISIT (OUTPATIENT)
Dept: PRIMARY CARE CLINIC | Age: 69
End: 2024-05-15
Payer: MEDICARE

## 2024-05-15 VITALS
RESPIRATION RATE: 18 BRPM | OXYGEN SATURATION: 92 % | BODY MASS INDEX: 43.81 KG/M2 | HEART RATE: 80 BPM | WEIGHT: 315 LBS | DIASTOLIC BLOOD PRESSURE: 51 MMHG | SYSTOLIC BLOOD PRESSURE: 82 MMHG

## 2024-05-15 DIAGNOSIS — Z79.4 TYPE 2 DIABETES MELLITUS WITH STAGE 3A CHRONIC KIDNEY DISEASE, WITH LONG-TERM CURRENT USE OF INSULIN (HCC): ICD-10-CM

## 2024-05-15 DIAGNOSIS — E86.0 DEHYDRATION: ICD-10-CM

## 2024-05-15 DIAGNOSIS — I95.9 HYPOTENSION, UNSPECIFIED HYPOTENSION TYPE: Primary | ICD-10-CM

## 2024-05-15 DIAGNOSIS — E11.22 TYPE 2 DIABETES MELLITUS WITH STAGE 3A CHRONIC KIDNEY DISEASE, WITH LONG-TERM CURRENT USE OF INSULIN (HCC): ICD-10-CM

## 2024-05-15 DIAGNOSIS — R63.4 WEIGHT LOSS: ICD-10-CM

## 2024-05-15 DIAGNOSIS — N18.31 TYPE 2 DIABETES MELLITUS WITH STAGE 3A CHRONIC KIDNEY DISEASE, WITH LONG-TERM CURRENT USE OF INSULIN (HCC): ICD-10-CM

## 2024-05-15 LAB
ALBUMIN SERPL-MCNC: 4.1 G/DL (ref 3.4–4.8)
ALBUMIN/GLOB SERPL: 1.6 {RATIO} (ref 0.8–2)
ALP SERPL-CCNC: 93 U/L (ref 25–100)
ALT SERPL-CCNC: 33 U/L (ref 4–36)
ANION GAP SERPL CALCULATED.3IONS-SCNC: 11 MMOL/L (ref 3–16)
AST SERPL-CCNC: 18 U/L (ref 8–33)
BILIRUB SERPL-MCNC: 0.4 MG/DL (ref 0.3–1.2)
BUN SERPL-MCNC: 24 MG/DL (ref 6–20)
CALCIUM SERPL-MCNC: 8.6 MG/DL (ref 8.5–10.5)
CHLORIDE SERPL-SCNC: 101 MMOL/L (ref 98–107)
CO2 SERPL-SCNC: 24 MMOL/L (ref 20–30)
CREAT SERPL-MCNC: 1.4 MG/DL (ref 0.4–1.2)
ERYTHROCYTE [DISTWIDTH] IN BLOOD BY AUTOMATED COUNT: 12.4 % (ref 11–16)
GFR SERPLBLD CREATININE-BSD FMLA CKD-EPI: 54 ML/MIN/{1.73_M2}
GLOBULIN SER CALC-MCNC: 2.5 G/DL
GLUCOSE BLD-MCNC: 109 MG/DL (ref 74–106)
GLUCOSE BLD-MCNC: 181 MG/DL (ref 74–106)
GLUCOSE SERPL-MCNC: 172 MG/DL (ref 74–106)
HBA1C MFR BLD: 7.8 %
HCT VFR BLD AUTO: 38.4 % (ref 40–54)
HGB BLD-MCNC: 12.4 G/DL (ref 13–18)
MAGNESIUM SERPL-MCNC: 2 MG/DL (ref 1.7–2.4)
MCH RBC QN AUTO: 29.3 PG (ref 27–32)
MCHC RBC AUTO-ENTMCNC: 32.3 G/DL (ref 31–35)
MCV RBC AUTO: 90.8 FL (ref 80–100)
PERFORMED ON: ABNORMAL
PERFORMED ON: ABNORMAL
PLATELET # BLD AUTO: 202 K/UL (ref 150–400)
PMV BLD AUTO: 10.2 FL (ref 6–10)
POTASSIUM SERPL-SCNC: 4.3 MMOL/L (ref 3.4–5.1)
PROT SERPL-MCNC: 6.6 G/DL (ref 6.4–8.3)
RBC # BLD AUTO: 4.23 M/UL (ref 4.5–6)
SODIUM SERPL-SCNC: 136 MMOL/L (ref 136–145)
WBC # BLD AUTO: 9.5 K/UL (ref 4–11)

## 2024-05-15 PROCEDURE — 1036F TOBACCO NON-USER: CPT | Performed by: INTERNAL MEDICINE

## 2024-05-15 PROCEDURE — 3078F DIAST BP <80 MM HG: CPT | Performed by: INTERNAL MEDICINE

## 2024-05-15 PROCEDURE — G0379 DIRECT REFER HOSPITAL OBSERV: HCPCS

## 2024-05-15 PROCEDURE — G8427 DOCREV CUR MEDS BY ELIG CLIN: HCPCS | Performed by: INTERNAL MEDICINE

## 2024-05-15 PROCEDURE — 85027 COMPLETE CBC AUTOMATED: CPT

## 2024-05-15 PROCEDURE — 36415 COLL VENOUS BLD VENIPUNCTURE: CPT

## 2024-05-15 PROCEDURE — G8417 CALC BMI ABV UP PARAM F/U: HCPCS | Performed by: INTERNAL MEDICINE

## 2024-05-15 PROCEDURE — 80053 COMPREHEN METABOLIC PANEL: CPT

## 2024-05-15 PROCEDURE — 99214 OFFICE O/P EST MOD 30 MIN: CPT | Performed by: INTERNAL MEDICINE

## 2024-05-15 PROCEDURE — 83735 ASSAY OF MAGNESIUM: CPT

## 2024-05-15 PROCEDURE — 6360000002 HC RX W HCPCS: Performed by: NURSE PRACTITIONER

## 2024-05-15 PROCEDURE — 1123F ACP DISCUSS/DSCN MKR DOCD: CPT | Performed by: INTERNAL MEDICINE

## 2024-05-15 PROCEDURE — G0378 HOSPITAL OBSERVATION PER HR: HCPCS

## 2024-05-15 PROCEDURE — 83036 HEMOGLOBIN GLYCOSYLATED A1C: CPT

## 2024-05-15 PROCEDURE — 3017F COLORECTAL CA SCREEN DOC REV: CPT | Performed by: INTERNAL MEDICINE

## 2024-05-15 PROCEDURE — 2022F DILAT RTA XM EVC RTNOPTHY: CPT | Performed by: INTERNAL MEDICINE

## 2024-05-15 PROCEDURE — 3051F HG A1C>EQUAL 7.0%<8.0%: CPT | Performed by: INTERNAL MEDICINE

## 2024-05-15 PROCEDURE — 97161 PT EVAL LOW COMPLEX 20 MIN: CPT

## 2024-05-15 PROCEDURE — 6370000000 HC RX 637 (ALT 250 FOR IP): Performed by: NURSE PRACTITIONER

## 2024-05-15 PROCEDURE — 2580000003 HC RX 258: Performed by: NURSE PRACTITIONER

## 2024-05-15 PROCEDURE — 97165 OT EVAL LOW COMPLEX 30 MIN: CPT

## 2024-05-15 PROCEDURE — 3074F SYST BP LT 130 MM HG: CPT | Performed by: INTERNAL MEDICINE

## 2024-05-15 RX ORDER — CARVEDILOL 12.5 MG/1
12.5 TABLET ORAL 2 TIMES DAILY
Status: DISCONTINUED | OUTPATIENT
Start: 2024-05-15 | End: 2024-05-16 | Stop reason: HOSPADM

## 2024-05-15 RX ORDER — ACETAMINOPHEN 650 MG/1
650 SUPPOSITORY RECTAL EVERY 6 HOURS PRN
Status: DISCONTINUED | OUTPATIENT
Start: 2024-05-15 | End: 2024-05-16 | Stop reason: HOSPADM

## 2024-05-15 RX ORDER — LISINOPRIL 20 MG/1
20 TABLET ORAL DAILY
Status: DISCONTINUED | OUTPATIENT
Start: 2024-05-16 | End: 2024-05-16 | Stop reason: HOSPADM

## 2024-05-15 RX ORDER — LANOLIN ALCOHOL/MO/W.PET/CERES
400 CREAM (GRAM) TOPICAL 2 TIMES DAILY
Status: DISCONTINUED | OUTPATIENT
Start: 2024-05-15 | End: 2024-05-16 | Stop reason: HOSPADM

## 2024-05-15 RX ORDER — FERROUS SULFATE 324(65)MG
324 TABLET, DELAYED RELEASE (ENTERIC COATED) ORAL
Status: DISCONTINUED | OUTPATIENT
Start: 2024-05-15 | End: 2024-05-16 | Stop reason: HOSPADM

## 2024-05-15 RX ORDER — PRAVASTATIN SODIUM 20 MG
80 TABLET ORAL NIGHTLY
Status: DISCONTINUED | OUTPATIENT
Start: 2024-05-15 | End: 2024-05-16 | Stop reason: HOSPADM

## 2024-05-15 RX ORDER — IBUPROFEN 600 MG/1
1 TABLET ORAL PRN
Status: DISCONTINUED | OUTPATIENT
Start: 2024-05-15 | End: 2024-05-16 | Stop reason: HOSPADM

## 2024-05-15 RX ORDER — OXYCODONE AND ACETAMINOPHEN 10; 325 MG/1; MG/1
1 TABLET ORAL EVERY 8 HOURS PRN
Status: DISCONTINUED | OUTPATIENT
Start: 2024-05-15 | End: 2024-05-16 | Stop reason: HOSPADM

## 2024-05-15 RX ORDER — TRAZODONE HYDROCHLORIDE 50 MG/1
100 TABLET ORAL NIGHTLY
Status: DISCONTINUED | OUTPATIENT
Start: 2024-05-15 | End: 2024-05-16 | Stop reason: HOSPADM

## 2024-05-15 RX ORDER — SODIUM CHLORIDE 9 MG/ML
INJECTION, SOLUTION INTRAVENOUS CONTINUOUS
Status: DISCONTINUED | OUTPATIENT
Start: 2024-05-15 | End: 2024-05-16 | Stop reason: HOSPADM

## 2024-05-15 RX ORDER — GABAPENTIN 400 MG/1
800 CAPSULE ORAL 3 TIMES DAILY
Status: DISCONTINUED | OUTPATIENT
Start: 2024-05-15 | End: 2024-05-16 | Stop reason: HOSPADM

## 2024-05-15 RX ORDER — FOLIC ACID 1 MG/1
1 TABLET ORAL DAILY
Status: DISCONTINUED | OUTPATIENT
Start: 2024-05-15 | End: 2024-05-16 | Stop reason: HOSPADM

## 2024-05-15 RX ORDER — FINASTERIDE 5 MG/1
5 TABLET, FILM COATED ORAL DAILY
Status: DISCONTINUED | OUTPATIENT
Start: 2024-05-16 | End: 2024-05-16 | Stop reason: HOSPADM

## 2024-05-15 RX ORDER — DEXTROSE MONOHYDRATE 100 MG/ML
INJECTION, SOLUTION INTRAVENOUS CONTINUOUS PRN
Status: DISCONTINUED | OUTPATIENT
Start: 2024-05-15 | End: 2024-05-16 | Stop reason: HOSPADM

## 2024-05-15 RX ORDER — HYDRALAZINE HYDROCHLORIDE 25 MG/1
100 TABLET, FILM COATED ORAL EVERY 8 HOURS SCHEDULED
Status: DISCONTINUED | OUTPATIENT
Start: 2024-05-15 | End: 2024-05-15

## 2024-05-15 RX ORDER — TAMSULOSIN HYDROCHLORIDE 0.4 MG/1
0.4 CAPSULE ORAL DAILY
Status: DISCONTINUED | OUTPATIENT
Start: 2024-05-16 | End: 2024-05-16 | Stop reason: HOSPADM

## 2024-05-15 RX ORDER — ENOXAPARIN SODIUM 100 MG/ML
30 INJECTION SUBCUTANEOUS 2 TIMES DAILY
Status: DISCONTINUED | OUTPATIENT
Start: 2024-05-15 | End: 2024-05-16 | Stop reason: HOSPADM

## 2024-05-15 RX ORDER — SODIUM CHLORIDE 0.9 % (FLUSH) 0.9 %
10 SYRINGE (ML) INJECTION PRN
Status: DISCONTINUED | OUTPATIENT
Start: 2024-05-15 | End: 2024-05-16 | Stop reason: HOSPADM

## 2024-05-15 RX ORDER — ONDANSETRON 2 MG/ML
4 INJECTION INTRAMUSCULAR; INTRAVENOUS EVERY 6 HOURS PRN
Status: DISCONTINUED | OUTPATIENT
Start: 2024-05-15 | End: 2024-05-16 | Stop reason: HOSPADM

## 2024-05-15 RX ORDER — INSULIN GLARGINE 100 [IU]/ML
45 INJECTION, SOLUTION SUBCUTANEOUS NIGHTLY
Status: DISCONTINUED | OUTPATIENT
Start: 2024-05-15 | End: 2024-05-16 | Stop reason: HOSPADM

## 2024-05-15 RX ORDER — HYDRALAZINE HYDROCHLORIDE 25 MG/1
50 TABLET, FILM COATED ORAL EVERY 8 HOURS SCHEDULED
Status: DISCONTINUED | OUTPATIENT
Start: 2024-05-15 | End: 2024-05-16 | Stop reason: HOSPADM

## 2024-05-15 RX ORDER — ACETAMINOPHEN 325 MG/1
650 TABLET ORAL EVERY 6 HOURS PRN
Status: DISCONTINUED | OUTPATIENT
Start: 2024-05-15 | End: 2024-05-16 | Stop reason: HOSPADM

## 2024-05-15 RX ORDER — LEVOTHYROXINE SODIUM 88 UG/1
88 TABLET ORAL
Status: DISCONTINUED | OUTPATIENT
Start: 2024-05-16 | End: 2024-05-16 | Stop reason: HOSPADM

## 2024-05-15 RX ORDER — ALLOPURINOL 100 MG/1
100 TABLET ORAL DAILY
Status: DISCONTINUED | OUTPATIENT
Start: 2024-05-16 | End: 2024-05-15 | Stop reason: ALTCHOICE

## 2024-05-15 RX ORDER — ONDANSETRON 4 MG/1
4 TABLET, ORALLY DISINTEGRATING ORAL EVERY 8 HOURS PRN
Status: DISCONTINUED | OUTPATIENT
Start: 2024-05-15 | End: 2024-05-16 | Stop reason: HOSPADM

## 2024-05-15 RX ADMIN — GABAPENTIN 800 MG: 400 CAPSULE ORAL at 14:05

## 2024-05-15 RX ADMIN — PRAVASTATIN SODIUM 80 MG: 20 TABLET ORAL at 21:08

## 2024-05-15 RX ADMIN — SODIUM CHLORIDE: 9 INJECTION, SOLUTION INTRAVENOUS at 11:32

## 2024-05-15 RX ADMIN — TRAZODONE HYDROCHLORIDE 100 MG: 50 TABLET ORAL at 21:08

## 2024-05-15 RX ADMIN — ENOXAPARIN SODIUM 30 MG: 100 INJECTION SUBCUTANEOUS at 21:08

## 2024-05-15 RX ADMIN — HYDRALAZINE HYDROCHLORIDE 50 MG: 25 TABLET ORAL at 21:07

## 2024-05-15 RX ADMIN — GABAPENTIN 800 MG: 400 CAPSULE ORAL at 21:08

## 2024-05-15 RX ADMIN — OXYCODONE AND ACETAMINOPHEN 1 TABLET: 325; 10 TABLET ORAL at 21:12

## 2024-05-15 RX ADMIN — Medication 400 MG: at 21:08

## 2024-05-15 RX ADMIN — HYDRALAZINE HYDROCHLORIDE 50 MG: 25 TABLET ORAL at 14:05

## 2024-05-15 RX ADMIN — CARVEDILOL 12.5 MG: 12.5 TABLET, FILM COATED ORAL at 21:08

## 2024-05-15 ASSESSMENT — ENCOUNTER SYMPTOMS
EYE DISCHARGE: 0
WHEEZING: 0
BACK PAIN: 0
COUGH: 0
VOMITING: 0
ABDOMINAL PAIN: 0
NAUSEA: 0
SHORTNESS OF BREATH: 0
SINUS PRESSURE: 0

## 2024-05-15 ASSESSMENT — PAIN SCALES - GENERAL: PAINLEVEL_OUTOF10: 6

## 2024-05-15 ASSESSMENT — LIFESTYLE VARIABLES: HOW OFTEN DO YOU HAVE A DRINK CONTAINING ALCOHOL: NEVER

## 2024-05-15 NOTE — PROGRESS NOTES
SUBJECTIVE:    Patient ID: Alfredo Perez is a 69 y.o.male.    Chief Complaint   Patient presents with    Diabetes    Hypertension    Hyperlipidemia    Other     Dry mouth     Dizziness         HPI:  Patient has had diabetes for the past several years.  He has been compliant with the medications and denies any side effects from it. He has been monitoring fingersticks on a daily basis.  His fingerstick range is between 120-240. He denies any hypoglycemic symptoms. He has been following a diabetic diet and has been active.  His last eye exam was more than a year ago.  He is using Lantus and Ozempic.     Patient has had hypertension and lipid for several years. He has been compliant with taking medications, without side effects from it. He has been following a low-sodium, is active and rarely exercises.  Weight is down 37 pounds, compared to last visit. His blood pressure is low at this time.  Patient reported his mouth is really dry.  He reported feeling weak and lightheaded.  Denies any diarrhea.  He reported he has been watching his diet and his appetite is down compared to before. Patient states he is not feeling well today. Symptoms include dizziness, dry mouth and fatigue. Symptoms for last few days and getting worse.     Patient's medications, allergies, past medical, surgical, social and family histories were reviewed and updated as appropriate in electronic medical record.        Outpatient Medications Marked as Taking for the 5/15/24 encounter (Office Visit) with Starr Morrison MD   Medication Sig Dispense Refill    carvedilol (COREG) 12.5 MG tablet TAKE 1 TABLET TWICE DAILY 180 tablet 1    finasteride (PROSCAR) 5 MG tablet TAKE ONE TABLET BY MOUTH EVERY DAY 90 tablet 1    tamsulosin (FLOMAX) 0.4 MG capsule TAKE ONE CAPSULE BY MOUTH EVERY DAY 90 capsule 1    hydrALAZINE (APRESOLINE) 100 MG tablet TAKE 1 TABLET BY MOUTH EVERY 8 HOURS 90 tablet 3    lidocaine (LIDODERM) 5 %       Semaglutide, 1 MG/DOSE,

## 2024-05-15 NOTE — PROGRESS NOTES
Chief Complaint   Patient presents with    Diabetes    Hypertension    Hyperlipidemia    Other     Dry mouth     Dizziness     Lantus 45 nightly   Ozempic not sure on his dose.. he said 0.2 but last one we sent was 1mg ?  Never got the humalog   Fs range -avg 120-130 has got up to 240    Have you seen any other physician or provider since your last visit   yes - shot in shoulder ortho   Have you had any other diagnostic tests since your last visit? yes - MRI     Have you changed or stopped any medications since your last visit? yes - never got humalog

## 2024-05-15 NOTE — FLOWSHEET NOTE
05/15/24 1130   Assessment   Charting Type Admission   Psychosocial   Psychosocial (WDL) WDL   Neurological   Neuro (WDL) WDL   Wykoff Coma Scale   Eye Opening 4   Best Verbal Response 5   Best Motor Response 6   Therese Coma Scale Score 15   HEENT (Head, Ears, Eyes, Nose, & Throat)   HEENT (WDL) X   Right Eye Glasses   Left Eye Glasses   Respiratory   Respiratory (WDL) WDL   Cardiac   Cardiac (WDL) WDL   Gastrointestinal   Abdominal (WDL) X   Abdomen Inspection Rotund   Last BM (including prior to admit) 05/14/24   Genitourinary   Genitourinary (WDL) WDL   Peripheral Vascular   Peripheral Vascular (WDL) X   Edema Left lower extremity;Right lower extremity   RLE Edema +1;Non-pitting   LLE Edema +1;Non-pitting   RLE Neurovascular Assessment   Capillary Refill Less than/Equal to 3 seconds   Color Pink   Temperature Warm   RLE Sensation  Full sensation   R Post Tibial Pulse +2 (Moderate)   R Pedal Pulse +2   LLE Neurovascular Assessment   Capillary Refill Less than/Equal to 3 seconds   Color Pink   Temperature Warm   LLE Sensation  Full sensation   L Pedal Pulse +2   Dual Clinician Skin Assessment   Dual Skin Assessment (4 Eyes) WDL   Second Clinical  (First and Last Name) Maria Weinstein   Skin Integumentary    Skin Integumentary (WDL) WDL     69 year old male, who was direct admit to room 3.  Pt came from Dr. Morrison office with wt loss and low blood pressure.  Pt complains of occasional dizziness when getting out of bed.  PIV started in left hand.  IV fluids at 75 ml per hour.  Awake, alert and oriented x4.  Oriented to room unit and plan of care.

## 2024-05-16 VITALS
BODY MASS INDEX: 42.66 KG/M2 | HEART RATE: 75 BPM | TEMPERATURE: 98 F | OXYGEN SATURATION: 93 % | RESPIRATION RATE: 18 BRPM | HEIGHT: 72 IN | DIASTOLIC BLOOD PRESSURE: 76 MMHG | SYSTOLIC BLOOD PRESSURE: 130 MMHG | WEIGHT: 315 LBS

## 2024-05-16 LAB
ALBUMIN SERPL-MCNC: 3.7 G/DL (ref 3.4–4.8)
ALBUMIN/GLOB SERPL: 1.8 {RATIO} (ref 0.8–2)
ALP SERPL-CCNC: 88 U/L (ref 25–100)
ALT SERPL-CCNC: 25 U/L (ref 4–36)
ANION GAP SERPL CALCULATED.3IONS-SCNC: 11 MMOL/L (ref 3–16)
AST SERPL-CCNC: 13 U/L (ref 8–33)
BASOPHILS # BLD: 0.1 K/UL (ref 0–0.1)
BASOPHILS NFR BLD: 0.6 %
BILIRUB SERPL-MCNC: <0.2 MG/DL (ref 0.3–1.2)
BUN SERPL-MCNC: 21 MG/DL (ref 6–20)
CALCIUM SERPL-MCNC: 8 MG/DL (ref 8.5–10.5)
CHLORIDE SERPL-SCNC: 102 MMOL/L (ref 98–107)
CO2 SERPL-SCNC: 23 MMOL/L (ref 20–30)
CREAT SERPL-MCNC: 1.1 MG/DL (ref 0.4–1.2)
EOSINOPHIL # BLD: 0.6 K/UL (ref 0–0.4)
EOSINOPHIL NFR BLD: 5.8 %
ERYTHROCYTE [DISTWIDTH] IN BLOOD BY AUTOMATED COUNT: 12.4 % (ref 11–16)
GFR SERPLBLD CREATININE-BSD FMLA CKD-EPI: 72 ML/MIN/{1.73_M2}
GLOBULIN SER CALC-MCNC: 2.1 G/DL
GLUCOSE BLD-MCNC: 136 MG/DL (ref 74–106)
GLUCOSE SERPL-MCNC: 148 MG/DL (ref 74–106)
HCT VFR BLD AUTO: 35.6 % (ref 40–54)
HGB BLD-MCNC: 11.9 G/DL (ref 13–18)
IMM GRANULOCYTES # BLD: 0 K/UL
IMM GRANULOCYTES NFR BLD: 0.2 % (ref 0–5)
LYMPHOCYTES # BLD: 2.4 K/UL (ref 1.5–4)
LYMPHOCYTES NFR BLD: 25.4 %
MCH RBC QN AUTO: 30.4 PG (ref 27–32)
MCHC RBC AUTO-ENTMCNC: 33.4 G/DL (ref 31–35)
MCV RBC AUTO: 91 FL (ref 80–100)
MONOCYTES # BLD: 0.5 K/UL (ref 0.2–0.8)
MONOCYTES NFR BLD: 5.4 %
NEUTROPHILS # BLD: 6 K/UL (ref 2–7.5)
NEUTS SEG NFR BLD: 62.6 %
PERFORMED ON: ABNORMAL
PLATELET # BLD AUTO: 184 K/UL (ref 150–400)
PMV BLD AUTO: 10.4 FL (ref 6–10)
POTASSIUM SERPL-SCNC: 3.9 MMOL/L (ref 3.4–5.1)
PROT SERPL-MCNC: 5.8 G/DL (ref 6.4–8.3)
RBC # BLD AUTO: 3.91 M/UL (ref 4.5–6)
SODIUM SERPL-SCNC: 136 MMOL/L (ref 136–145)
WBC # BLD AUTO: 9.5 K/UL (ref 4–11)

## 2024-05-16 PROCEDURE — G0378 HOSPITAL OBSERVATION PER HR: HCPCS

## 2024-05-16 PROCEDURE — 6370000000 HC RX 637 (ALT 250 FOR IP): Performed by: NURSE PRACTITIONER

## 2024-05-16 PROCEDURE — 99235 HOSP IP/OBS SAME DATE MOD 70: CPT | Performed by: INTERNAL MEDICINE

## 2024-05-16 PROCEDURE — 36415 COLL VENOUS BLD VENIPUNCTURE: CPT

## 2024-05-16 PROCEDURE — 6360000002 HC RX W HCPCS: Performed by: NURSE PRACTITIONER

## 2024-05-16 PROCEDURE — 80053 COMPREHEN METABOLIC PANEL: CPT

## 2024-05-16 PROCEDURE — 85025 COMPLETE CBC W/AUTO DIFF WBC: CPT

## 2024-05-16 RX ORDER — HYDRALAZINE HYDROCHLORIDE 100 MG/1
50 TABLET, FILM COATED ORAL EVERY 8 HOURS SCHEDULED
Qty: 90 TABLET | Refills: 0 | Status: SHIPPED | OUTPATIENT
Start: 2024-05-16

## 2024-05-16 RX ORDER — INSULIN GLARGINE 100 [IU]/ML
45 INJECTION, SOLUTION SUBCUTANEOUS NIGHTLY
Qty: 90 ML | Refills: 3 | Status: SHIPPED | OUTPATIENT
Start: 2024-05-16

## 2024-05-16 RX ORDER — ALLOPURINOL 100 MG/1
100 TABLET ORAL DAILY PRN
Qty: 30 TABLET | Refills: 0 | Status: SHIPPED | OUTPATIENT
Start: 2024-05-16 | End: 2025-05-16

## 2024-05-16 RX ADMIN — GABAPENTIN 800 MG: 400 CAPSULE ORAL at 08:21

## 2024-05-16 RX ADMIN — HYDRALAZINE HYDROCHLORIDE 50 MG: 25 TABLET ORAL at 06:09

## 2024-05-16 RX ADMIN — Medication 400 MG: at 08:22

## 2024-05-16 RX ADMIN — TAMSULOSIN HYDROCHLORIDE 0.4 MG: 0.4 CAPSULE ORAL at 08:21

## 2024-05-16 RX ADMIN — LEVOTHYROXINE SODIUM 88 MCG: 0.09 TABLET ORAL at 06:09

## 2024-05-16 RX ADMIN — FOLIC ACID 1 MG: 1 TABLET ORAL at 08:22

## 2024-05-16 RX ADMIN — ENOXAPARIN SODIUM 30 MG: 100 INJECTION SUBCUTANEOUS at 08:20

## 2024-05-16 RX ADMIN — LISINOPRIL 20 MG: 20 TABLET ORAL at 08:21

## 2024-05-16 RX ADMIN — SERTRALINE HYDROCHLORIDE 50 MG: 50 TABLET ORAL at 08:22

## 2024-05-16 RX ADMIN — CARVEDILOL 12.5 MG: 12.5 TABLET, FILM COATED ORAL at 08:22

## 2024-05-16 RX ADMIN — FINASTERIDE 5 MG: 5 TABLET, FILM COATED ORAL at 08:23

## 2024-05-16 NOTE — CARE COORDINATION
Gave patient welcome pack.  
Left voice mail telling patient of follow-up 5/21/2024 with Dr. Morrison.  
DC needs noted at this time.        The Plan for Transition of Care is related to the following treatment goals of Dehydration [E86.0]    The Patient and/or patient representative Alfredo and his family were provided with a choice of provider and agrees with the discharge plan Yes    Freedom of choice list was provided with basic dialogue that supports the patient's individualized plan of care/goals and shares the quality data associated with the providers. Yes    Care Transitions patient: Yes    
Yes  Other Identified Issues/Barriers to RETURNING to current housing: none  Potential Assistance needed at discharge: N/A            Potential DME:  none  Patient expects to discharge to: House  Plan for transportation at discharge:  POV    Financial    Payor: [employer] MEDICARE / Plan: [employer] CHOICE-PPO MEDICARE / Product Type: *No Product type* /     Does insurance require precert for SNF: Yes    Potential assistance Purchasing Medications: No  Meds-to-Beds request:        [facility] - [facility] [address] [facility] - P [phone] - F [phone]  [address] [facility] [address]  Phone: [phone] Fax: [phone]    [facility] [address] [place] [address] -  [phone] - F [phone]  [address] [place] [address]  Phone: [phone] Fax: [phone]      Notes:    Factors facilitating achievement of predicted outcomes: Family support, Motivated, Cooperative, Pleasant, and Has needed Durable Medical Equipment at home    Barriers to discharge: none    Additional Case Management Notes: Lives with SO.  Has SC, BSC, rollator, RW, cane, cpap.  No DME needs noted.      The Plan for Transition of Care is related to the following treatment goals of Dehydration [E86.0]

## 2024-05-16 NOTE — ACP (ADVANCE CARE PLANNING)
Advance Care Planning     General Advance Care Planning (ACP) Conversation    Date of Conversation: 5/16/2024  Conducted with: Patient with Decision Making Capacity  Other persons present: None    Healthcare Decision Maker:   Primary Decision Maker: Winnie Perez - Spouse - [phone]    Secondary Decision Maker: Loly Perez - Child - [phone]    Secondary Decision Maker: jr barbara - Child - [phone]  Click here to complete Healthcare Decision Makers including selection of the Healthcare Decision Maker Relationship (ie \"Primary\").   Today we documented Decision Maker(s) consistent with Legal Next of Kin hierarchy.    Content/Action Overview:  Has NO ACP documents-Information provided  Reviewed DNR/DNI and patient elects Full Code (Attempt Resuscitation)  artificial nutrition, ventilation preferences, and resuscitation preferences      Length of Voluntary ACP Conversation in minutes:  <16 minutes (Non-Billable)    Fidel Love

## 2024-05-16 NOTE — H&P
home    Discharged Condition: Stable    Code Status: Full Code    Activity: activity as tolerated    Diet: diabetic diet    Follow Up: Primary Care Physician in one week. Decrease ozempic to 1mg weekly. BP meds adjusted and keep log to take to pcp visit.         Discharge Medications:     Discharge Medication List as of 5/16/2024 10:39 AM             Details   insulin glargine (LANTUS SOLOSTAR) 100 UNIT/ML injection pen Inject 45 Units into the skin nightly, Disp-90 mL, R-3Normal      hydrALAZINE (APRESOLINE) 100 MG tablet Take 0.5 tablets by mouth every 8 hours Hold if top number blood pressure 100 or less, Disp-90 tablet, R-0Normal      allopurinol (ZYLOPRIM) 100 MG tablet Take 1 tablet by mouth daily as needed (gout) For gout, Disp-30 tablet, R-0Normal                Details   carvedilol (COREG) 12.5 MG tablet TAKE 1 TABLET TWICE DAILY, Disp-180 tablet, R-1Normal      finasteride (PROSCAR) 5 MG tablet TAKE ONE TABLET BY MOUTH EVERY DAY, Disp-90 tablet, R-1Normal      tamsulosin (FLOMAX) 0.4 MG capsule TAKE ONE CAPSULE BY MOUTH EVERY DAY, Disp-90 capsule, R-1Normal      Semaglutide, 1 MG/DOSE, (OZEMPIC, 1 MG/DOSE,) 4 MG/3ML SOPN Inject 1 mg into the skin once a week, Disp-3 mL, R-3DX E11.9Normal      !! Lancets MISC DAILY Starting Wed 2/7/2024, Disp-300 each, R-3, NormalBID PRN Dx:E11.9      metOLazone (ZAROXOLYN) 2.5 MG tablet Take 1 tablet by mouth as needed (worsening swelling, weight gain > 3 lbs) For swelling, Disp-45 tablet, R-3Normal      lisinopril (PRINIVIL;ZESTRIL) 20 MG tablet Take 1 tablet by mouth daily, Disp-90 tablet, R-1Normal      magnesium oxide (MAG-OX) 400 (240 Mg) MG tablet Take 1 tablet by mouth 2 times daily, Disp-180 tablet, R-1Normal      ascorbic acid (VITAMIN C) 500 MG tablet Take 1 tablet by mouth daily, Disp-90 tablet, R-1Normal      ferrous sulfate 324 (65 Fe) MG EC tablet Take 1 tablet by mouth daily (with breakfast), Disp-90 tablet, R-1Normal      folic acid (FOLVITE) 1 MG tablet

## 2024-05-16 NOTE — PLAN OF CARE
Problem: Discharge Planning  Goal: Discharge to home or other facility with appropriate resources  5/16/2024 1031 by Maye Fregoso RN  Outcome: Completed  5/15/2024 2213 by Tiffanie Wei RN  Outcome: Progressing  Flowsheets  Taken 5/15/2024 2125 by Tiffanie Wei RN  Discharge to home or other facility with appropriate resources:   Identify barriers to discharge with patient and caregiver   Refer to discharge planning if patient needs post-hospital services based on physician order or complex needs related to functional status, cognitive ability or social support system  Taken 5/15/2024 1144 by Maria Shi RN  Discharge to home or other facility with appropriate resources: Arrange for needed discharge resources and transportation as appropriate     Problem: Safety - Adult  Goal: Free from fall injury  5/16/2024 1031 by Maye Fregoso RN  Outcome: Completed  5/15/2024 2213 by Tiffanie Wei RN  Outcome: Progressing     Problem: ABCDS Injury Assessment  Goal: Absence of physical injury  5/16/2024 1031 by Maye Fregoso RN  Outcome: Completed  5/15/2024 2213 by Tiffanie Wei RN  Outcome: Progressing     Problem: Chronic Conditions and Co-morbidities  Goal: Patient's chronic conditions and co-morbidity symptoms are monitored and maintained or improved  5/16/2024 1031 by Maye Fregoso RN  Outcome: Completed  5/15/2024 2213 by Tiffanie Wei RN  Outcome: Progressing  Flowsheets (Taken 5/15/2024 2125)  Care Plan - Patient's Chronic Conditions and Co-Morbidity Symptoms are Monitored and Maintained or Improved: Monitor and assess patient's chronic conditions and comorbid symptoms for stability, deterioration, or improvement

## 2024-05-16 NOTE — PLAN OF CARE
Problem: Discharge Planning  Goal: Discharge to home or other facility with appropriate resources  Outcome: Progressing  Flowsheets  Taken 5/15/2024 2125 by Tiffanie Wei RN  Discharge to home or other facility with appropriate resources:   Identify barriers to discharge with patient and caregiver   Refer to discharge planning if patient needs post-hospital services based on physician order or complex needs related to functional status, cognitive ability or social support system  Taken 5/15/2024 1144 by Maria Shi RN  Discharge to home or other facility with appropriate resources: Arrange for needed discharge resources and transportation as appropriate     Problem: Safety - Adult  Goal: Free from fall injury  5/15/2024 2213 by Tiffanie Wei RN  Outcome: Progressing  5/15/2024 1146 by Maria Shi RN  Outcome: Progressing     Problem: ABCDS Injury Assessment  Goal: Absence of physical injury  Outcome: Progressing     Problem: Chronic Conditions and Co-morbidities  Goal: Patient's chronic conditions and co-morbidity symptoms are monitored and maintained or improved  5/15/2024 2213 by Tiffanie Wei RN  Outcome: Progressing  Flowsheets (Taken 5/15/2024 2125)  Care Plan - Patient's Chronic Conditions and Co-Morbidity Symptoms are Monitored and Maintained or Improved: Monitor and assess patient's chronic conditions and comorbid symptoms for stability, deterioration, or improvement  5/15/2024 1147 by Maria Shi RN  Outcome: Progressing

## 2024-05-16 NOTE — FLOWSHEET NOTE
05/15/24 2125   Assessment   Charting Type Shift assessment   Psychosocial   Psychosocial (WDL) WDL   Neurological   Neuro (WDL) WDL   Therese Coma Scale   Eye Opening 4   Best Verbal Response 5   Best Motor Response 6   [place] Coma Scale Score 15   NIHSS Stroke Scale   NIHSS Stroke Scale Assessed No   HEENT (Head, Ears, Eyes, Nose, & Throat)   HEENT (WDL) X   Right Eye Glasses   Left Eye Glasses   Respiratory   Respiratory (WDL) WDL   Gastrointestinal   Abdominal (WDL) X   Abdomen Inspection Rounded   Last BM (including prior to admit) 05/14/24   Genitourinary   Genitourinary (WDL) WDL   Peripheral Vascular   Peripheral Vascular (WDL) X   Edema Left lower extremity;Right lower extremity   RLE Edema +1;Non-pitting   LLE Edema +1;Non-pitting   RLE Neurovascular Assessment   Capillary Refill Less than/Equal to 3 seconds   Color Pink   Temperature Warm   RLE Sensation  Full sensation   LLE Neurovascular Assessment   Capillary Refill Less than/Equal to 3 seconds   Color Pink   Temperature Warm   LLE Sensation  Full sensation   Skin Integumentary    Skin Integumentary (WDL) WDL   Musculoskeletal   Musculoskeletal (WDL) WDL   Care Plan - Chronic Conditions and Co-Morbidity   Care Plan - Patient's Chronic Conditions and Co-Morbidity Symptoms are Monitored and Maintained or Improved Monitor and assess patient's chronic conditions and comorbid symptoms for stability, deterioration, or improvement   Care Plan - Discharge Planning Goals   Discharge to home or other facility with appropriate resources Identify barriers to discharge with patient and caregiver;Refer to discharge planning if patient needs post-hospital services based on physician order or complex needs related to functional status, cognitive ability or social support system

## 2024-05-16 NOTE — PROGRESS NOTES
4 Eyes Skin Assessment     NAME:  Alfredo Perez  YOB: 1955  MEDICAL RECORD NUMBER:  [id number]    The patient is being assessed for  Admission    I agree that at least one RN has performed a thorough Head to Toe Skin Assessment on the patient. ALL assessment sites listed below have been assessed.      Areas assessed by both nurses:    Head, Face, Ears, Shoulders, Back, Chest, Arms, Elbows, Hands, Sacrum. Buttock, Coccyx, Ischium, and Legs. Feet and Heels        Does the Patient have a Wound? No noted wound(s)       Kristian Prevention initiated by RN: No  Wound Care Orders initiated by RN: No    Pressure Injury (Stage 3,4, Unstageable, DTI, NWPT, and Complex wounds) if present, place Wound referral order by RN under : No    New Ostomies, if present place, Ostomy referral order under : No     Nurse 1 eSignature: Electronically signed by Maye Fregoso RN on 5/15/24 at 1:08 PM EDT    **SHARE this note so that the co-signing nurse can place an eSignature**    Nurse 2 eSignature: Electronically signed by Maria Shi RN on 5/15/24 at 1:09 PM EDT    
IV fluids stopped.  PIV removed.  Pt discharged home.  Pt verbalized understanding of discharge instructions and is aware to  new prescriptions at pharmacy.  Accompanied to exit.  
Medication Reconciliation completed with fill history from [facility] and [facility] and interview with patient and wife.  Not taking:  -lidocaine patch  -tizanidine 4 mg  -B12    Of note:   -Patient stated he received a shot for gout so he only takes Allopurinol as needed. Pt's last allopurinol was more than two weeks ago.   -Wife stated that he's been dehydrated so he hasn't taken Metolazone for about 3 weeks.   -Patient and Wife confirmed that he is taking Sertraline 50 mg daily but the last fill I could find was in December 2023 for a 90 day supply.   -Patient and Wife confirmed patient is taking Lantus 45 units once daily (either in the morning or at night) but the last fill I could find was from June 2023 for a 90 day supply.     Changed:  Oxycodone-APAP  mg 1t qd TO 2t qd prn    Candace Camejo, ReneaD  
Offered spiritual care to patient. Told pt to reach out if they needed anything further.   
Physical Therapy  Facility/Department: [facility]  Physical Therapy Initial Assessment/Discharge Summary    Name: Alfredo Perez  : 1955  MRN: [id number]  Date of Service: 5/15/2024    Discharge Recommendations:  Home with assist PRN          Patient Diagnosis(es): There were no encounter diagnoses.  Past Medical History:  has a past medical history of Chronic back pain, Chronic kidney disease, DDD (degenerative disc disease), lumbar, GERD (gastroesophageal reflux disease), Hypercholesteremia, Hypertension, Left lumbar radiculopathy, Obesity, Type II or unspecified type diabetes mellitus without mention of complication, not stated as uncontrolled, and Unspecified sleep apnea.  Past Surgical History:  has no past surgical history on file.    Assessment   Assessment: PT eval completed on this patient admitted to hospital with primary diagnosis of dehydration. He is received lying in bed on RA. NAD and no c/o pain. Pleasant and cooperative. He is able to perform bed mobility, sit to stand, transfers and ambulate 350' with I/SBA. Returned to bed per patient request. He appears to be at his baseline functional level and does not require continued skilled PT intervention at this time.  Therapy Prognosis: Excellent  Decision Making: Low Complexity  Requires PT Follow-Up: No  Activity Tolerance  Activity Tolerance: Patient tolerated evaluation without incident;Patient tolerated treatment well     Plan   Physical Therapy Plan  General Plan: Discharge with evaluation only  Safety Devices  Type of Devices: Call light within reach, Left in bed     Restrictions  Restrictions/Precautions  Restrictions/Precautions: General Precautions, Contact Precautions, Fall Risk  Required Braces or Orthoses?: No     Subjective   Pain: No c/o pain.  General  Patient assessed for rehabilitation services?: Yes  Family / Caregiver Present: No  Follows Commands: Within Functional Limits         Social/Functional History  Social/Functional 
sock donning.  Toileting: Modified independent   Toileting Skilled Clinical Factors: per self report     Activity Tolerance  Activity Tolerance: Patient tolerated evaluation without incident;Patient tolerated treatment well  Bed mobility  Rolling to Left: Independent  Supine to Sit: Independent  Sit to Supine: Independent  Scooting: Independent  Transfers  Stand Pivot Transfers: Supervision  Sit to stand: Supervision  Stand to sit: Supervision  Vision  Vision: Impaired  Vision Exceptions: Wears glasses at all times  Hearing  Hearing: Within functional limits  Cognition  Overall Cognitive Status: WFL  Orientation  Overall Orientation Status: Within Functional Limits    Therapy Time   Individual Concurrent Group Co-treatment   Time In 0117         Time Out 0133         Minutes 16          This note serves as a DC summary in the event of pt discharge.   Carrol Molina, OTR/L

## 2024-05-16 NOTE — DISCHARGE INSTRUCTIONS
Disposition: home    Activity: activity as tolerated    Diet: diabetic diet    Follow Up: Primary Care Physician in one week. Decrease ozempic to 1mg weekly. BP meds adjusted and keep log to take to pcp visit.

## 2024-05-16 NOTE — DISCHARGE SUMMARY
Take 1 tablet by mouth daily, Disp-90 tablet, R-1Normal      !! blood glucose monitor strips BID and PRN Dx: DM2, Disp-300 strip, R-3, Normal      albuterol sulfate HFA (PROVENTIL;VENTOLIN;PROAIR) 108 (90 Base) MCG/ACT inhaler Inhale 2 puffs into the lungs every 4 hours as needed for Wheezing, Disp-18 g, R-3Normal      levothyroxine (SYNTHROID) 88 MCG tablet Take 1 tablet by mouth daily thyroid, Disp-90 tablet, R-1Normal      traZODone (DESYREL) 100 MG tablet TAKE ONE TABLET BY MOUTH NIGHTLY for sleep, Disp-90 tablet, R-1Normal      sertraline (ZOLOFT) 50 MG tablet Take 1 tablet by mouth dailyHistorical Med      gabapentin (NEURONTIN) 800 MG tablet Take 1 tablet by mouth 3 times daily.Historical Med      meclizine (ANTIVERT) 25 MG CHEW Take 1 tablet by mouth 3 times daily as needed (dizziness)Historical Med      oxyCODONE-acetaminophen (PERCOCET)  MG per tablet Take 2 tablets by mouth daily as needed for Pain.Historical Med      glucose monitoring (FREESTYLE FREEDOM) kit DAILY Starting Wed 7/7/2021, Disp-1 kit, R-0, NormalSub covered brand BID and PRN Dx: DM2      Insulin Pen Needle (UNIFINE PENTIPS) 31G X 6 MM MISC Disp-100 each, R-5, NormalUSE TO INJECT INSULIN EVERY DAY      omeprazole (PRILOSEC) 40 MG delayed release capsule Take 1 capsule by mouth daily. For stomach, Disp-90 capsule, R-3Normal      pravastatin (PRAVACHOL) 80 MG tablet TAKE 1 TABLET BY MOUTH EVERY EVENING for cholesterol, Disp-90 tablet, R-3Normal      Needles & Syringes MISC DAILY Starting 9/13/2017, Until Discontinued, Disp-50 each, R-5, Printu-100 insulin syringe      !! FREESTYLE LANCETS MISC Disp-100 each, R-5, Normal      !! FREESTYLE LITE strip TEST BLOOD SUGAR TWO TIMES A DAY, Disp-100 strip, R-5E11.9Normal       !! - Potential duplicate medications found. Please discuss with provider.           15 minutes spent discharging this patient in the examination, evaluation, counseling and review of medications and discharge plan. Patient

## 2024-05-20 ENCOUNTER — CARE COORDINATION (OUTPATIENT)
Dept: PRIMARY CARE CLINIC | Age: 69
End: 2024-05-20

## 2024-05-20 NOTE — CARE COORDINATION
Care Transitions Initial Follow Up Call    Call within 2 business days of discharge: Yes    Patient Current Location:  Home: [address] [place] [address]    Care Transition Nurse contacted the patient by telephone to perform post hospital discharge assessment. Verified name and  with patient as identifiers. Provided introduction to self, and explanation of the Care Transition Nurse role.     Patient: Alfredo Perez Patient : 1955   MRN: [id number]  Reason for Admission: dehydration  Discharge Date: 24 RARS: Readmission Risk Score: 18.1      Last Discharge Facility       Date Complaint Diagnosis Description Type Department Provider    5/15/24   Admission (Discharged) Starr Molina MS, MD            Was this an external facility discharge? No Discharge Facility: [facility]    Challenges to be reviewed by the provider   Additional needs identified to be addressed with provider: No  none               Method of communication with provider: chart routing.    Alfredo reports that he is feeling well.  No issues eating or drinking.  His BP over the weekend is as follows, 122/70, 115/67, 142/76, 153/81, and 154/86 this morning.  His fasting blood sugar this morning was 134.  We discussed the changes in his medications.      Care Transition Nurse reviewed discharge instructions with patient who verbalized understanding. The patient was given an opportunity to ask questions and does not have any further questions or concerns at this time. Were discharge instructions available to patient? Yes. Reviewed appropriate site of care based on symptoms and resources available to patient including: PCP. The patient agrees to contact the PCP office for questions related to their healthcare.     Advance Care Planning:   Does patient have an Advance Directive: not on file; education provided.    Medication reconciliation was performed with patient, who verbalizes understanding of administration of home medications.

## 2024-05-21 ENCOUNTER — OFFICE VISIT (OUTPATIENT)
Dept: PRIMARY CARE CLINIC | Age: 69
End: 2024-05-21

## 2024-05-21 VITALS
SYSTOLIC BLOOD PRESSURE: 134 MMHG | DIASTOLIC BLOOD PRESSURE: 82 MMHG | WEIGHT: 315 LBS | RESPIRATION RATE: 18 BRPM | HEART RATE: 74 BPM | OXYGEN SATURATION: 92 % | BODY MASS INDEX: 44.16 KG/M2

## 2024-05-21 DIAGNOSIS — Z09 HOSPITAL DISCHARGE FOLLOW-UP: ICD-10-CM

## 2024-05-21 DIAGNOSIS — Z23 NEED FOR PROPHYLACTIC VACCINATION AGAINST STREPTOCOCCUS PNEUMONIAE (PNEUMOCOCCUS): ICD-10-CM

## 2024-05-21 DIAGNOSIS — E11.22 TYPE 2 DIABETES MELLITUS WITH STAGE 3A CHRONIC KIDNEY DISEASE, WITH LONG-TERM CURRENT USE OF INSULIN (HCC): Primary | ICD-10-CM

## 2024-05-21 DIAGNOSIS — Z23 NEED FOR PROPHYLACTIC VACCINATION AGAINST DIPHTHERIA-TETANUS-PERTUSSIS (DTP): ICD-10-CM

## 2024-05-21 DIAGNOSIS — R63.4 WEIGHT LOSS: ICD-10-CM

## 2024-05-21 DIAGNOSIS — D64.9 ANEMIA, UNSPECIFIED TYPE: ICD-10-CM

## 2024-05-21 DIAGNOSIS — N18.31 TYPE 2 DIABETES MELLITUS WITH STAGE 3A CHRONIC KIDNEY DISEASE, WITH LONG-TERM CURRENT USE OF INSULIN (HCC): Primary | ICD-10-CM

## 2024-05-21 DIAGNOSIS — I10 PRIMARY HYPERTENSION: ICD-10-CM

## 2024-05-21 DIAGNOSIS — Z79.4 TYPE 2 DIABETES MELLITUS WITH STAGE 3A CHRONIC KIDNEY DISEASE, WITH LONG-TERM CURRENT USE OF INSULIN (HCC): Primary | ICD-10-CM

## 2024-05-21 RX ORDER — VITAMIN B COMPLEX
2500 TABLET ORAL DAILY
Qty: 90 TABLET | Refills: 1 | Status: SHIPPED | OUTPATIENT
Start: 2024-05-21

## 2024-05-21 NOTE — PROGRESS NOTES
Post-Discharge Transitional Care Management Services or Hospital Follow Up      Alfredo Perez   YOB: 1955    Date of Office Visit:  5/21/2024  Date of Hospital Admission: 5/15/24  Date of Hospital Discharge: 5/16/24  Readmission Risk Score(high >=14%. Medium >=10%):Readmission Risk Score: 18.1      Care management risk score Rising risk (score 2-5) and Complex Care (Scores >=6): No Risk Score On File     Non face to face  following discharge, date last encounter closed (first attempt may have been earlier):   05/20/2024 05/20/2024    Call initiated 2 business days of discharge: Yes     Patient Active Problem List   Diagnosis    DDD (degenerative disc disease), lumbar    Chronic back pain    GERD (gastroesophageal reflux disease)    Hyperlipidemia    Hypertension    Left lumbar radiculopathy    Morbid obesity ([facility])    Sleep apnea    Type 2 diabetes mellitus with stage 3a chronic kidney disease, with long-term current use of insulin ([facility])    Hypothyroidism    Chronic kidney disease, stage III (moderate) ([facility])    Fever    Leukocytosis    Altered mental status    Acute on chronic respiratory failure with hypoxia ([facility])    Community acquired pneumonia of left lower lobe of lung    Acute kidney injury superimposed on CKD ([facility])    Sepsis with acute hypoxic respiratory failure ([facility])    Anemia    Pneumonia due to COVID-19 virus    Strain of back    Spasm of muscle    Dehydration       Allergies   Allergen Reactions    Atenolol Other (See Comments)     dizzy    Meloxicam      Dizzy, unable to focus       Medications listed as ordered at the time of discharge from hospital     Medication List            Accurate as of May 21, 2024  2:47 PM. If you have any questions, ask your nurse or doctor.                CONTINUE taking these medications      albuterol sulfate  (90 Base) MCG/ACT inhaler  Commonly known as: PROVENTIL;VENTOLIN;PROAIR  Inhale 2 puffs into the lungs every 4 hours as needed for Wheezing

## 2024-05-21 NOTE — PROGRESS NOTES
Pneumococcal Information Sheet, \"Prevnar 13/Pneumovax 23\" given to Alfredo Perez. Patient/Legal guardian of Alfredo Perez verbalized understanding.    Patient Responses    Have you had a pneumonia shot before? Yes  If so, when?  8/20/2020  Have you ever had an allergic reaction to a vaccine? No  Do you feel ill or have a fever today? No  Are you currently taking an antibiotic? No    Pneumococcal vaccine given per order. Please see immunization tab.    Risks and benefits explained.  Current VIS given.    Immunizations Administered       Name Date Dose Route    Pneumococcal, PCV20, PREVNAR 20, (age 6w+), IM, 0.5mL 5/21/2024 0.5 mL Intramuscular    Site: Deltoid- Left    Lot: [id number]    NDC: 3685-2789-32

## 2024-05-21 NOTE — PROGRESS NOTES
Chief Complaint   Patient presents with    Follow-Up from Hospital       Have you seen any other physician or provider since your last visit yes -     Have you had any other diagnostic tests since your last visit? yes -     Have you changed or stopped any medications since your last visit? no

## 2024-06-03 RX ORDER — SEMAGLUTIDE 1.34 MG/ML
1 INJECTION, SOLUTION SUBCUTANEOUS
Qty: 3 ML | Refills: 3 | Status: SHIPPED | OUTPATIENT
Start: 2024-06-03

## 2024-06-03 RX ORDER — MECLIZINE HYDROCHLORIDE 25 MG/1
25 TABLET ORAL 3 TIMES DAILY PRN
Qty: 270 TABLET | Refills: 0 | Status: SHIPPED | OUTPATIENT
Start: 2024-06-03 | End: 2024-07-03

## 2024-06-04 RX ORDER — SEMAGLUTIDE 1.34 MG/ML
1 INJECTION, SOLUTION SUBCUTANEOUS WEEKLY
Qty: 9 ML | Refills: 1 | Status: SHIPPED | OUTPATIENT
Start: 2024-06-04

## 2024-06-04 RX ORDER — SEMAGLUTIDE 1.34 MG/ML
INJECTION, SOLUTION SUBCUTANEOUS
Refills: 0 | OUTPATIENT
Start: 2024-06-04

## 2024-06-12 ENCOUNTER — HOSPITAL ENCOUNTER (OUTPATIENT)
Facility: HOSPITAL | Age: 69
Discharge: HOME | End: 2024-06-12
Payer: MEDICARE

## 2024-06-12 DIAGNOSIS — D64.9 ANEMIA, UNSPECIFIED TYPE: ICD-10-CM

## 2024-06-12 DIAGNOSIS — N18.31 TYPE 2 DIABETES MELLITUS WITH STAGE 3A CHRONIC KIDNEY DISEASE, WITH LONG-TERM CURRENT USE OF INSULIN (HCC): ICD-10-CM

## 2024-06-12 DIAGNOSIS — E11.22 TYPE 2 DIABETES MELLITUS WITH STAGE 3A CHRONIC KIDNEY DISEASE, WITH LONG-TERM CURRENT USE OF INSULIN (HCC): ICD-10-CM

## 2024-06-12 DIAGNOSIS — R63.4 WEIGHT LOSS: ICD-10-CM

## 2024-06-12 DIAGNOSIS — I10 PRIMARY HYPERTENSION: ICD-10-CM

## 2024-06-12 DIAGNOSIS — Z79.4 TYPE 2 DIABETES MELLITUS WITH STAGE 3A CHRONIC KIDNEY DISEASE, WITH LONG-TERM CURRENT USE OF INSULIN (HCC): ICD-10-CM

## 2024-06-12 LAB
ALBUMIN SERPL-MCNC: 4.2 G/DL (ref 3.4–4.8)
ALBUMIN/GLOB SERPL: 2 {RATIO} (ref 0.8–2)
ALP SERPL-CCNC: 110 U/L (ref 25–100)
ALT SERPL-CCNC: 17 U/L (ref 4–36)
ANION GAP SERPL CALCULATED.3IONS-SCNC: 13 MMOL/L (ref 3–16)
AST SERPL-CCNC: 13 U/L (ref 8–33)
BASOPHILS # BLD: 0.1 K/UL (ref 0–0.1)
BASOPHILS NFR BLD: 1 %
BILIRUB SERPL-MCNC: 0.3 MG/DL (ref 0.3–1.2)
BUN SERPL-MCNC: 16 MG/DL (ref 6–20)
CALCIUM SERPL-MCNC: 8.9 MG/DL (ref 8.5–10.5)
CHLORIDE SERPL-SCNC: 101 MMOL/L (ref 98–107)
CO2 SERPL-SCNC: 24 MMOL/L (ref 20–30)
CREAT SERPL-MCNC: 1.3 MG/DL (ref 0.4–1.2)
CREAT UR-MCNC: 183.5 MG/DL (ref 39–259)
EOSINOPHIL # BLD: 1.2 K/UL (ref 0–0.4)
EOSINOPHIL NFR BLD: 11 %
ERYTHROCYTE [DISTWIDTH] IN BLOOD BY AUTOMATED COUNT: 13 % (ref 11–16)
FERRITIN SERPL IA-MCNC: 832 NG/ML (ref 22–322)
FOLATE SERPL-MCNC: 6.54 NG/ML
GFR SERPLBLD CREATININE-BSD FMLA CKD-EPI: 59 ML/MIN/{1.73_M2}
GLOBULIN SER CALC-MCNC: 2.1 G/DL
GLUCOSE SERPL-MCNC: 207 MG/DL (ref 74–106)
HCT VFR BLD AUTO: 40.1 % (ref 40–54)
HGB BLD-MCNC: 12.6 G/DL (ref 13–18)
IMM GRANULOCYTES # BLD: 0 K/UL
IMM GRANULOCYTES NFR BLD: 0.3 % (ref 0–5)
IRON SATN MFR SERPL: 42 % (ref 20–50)
IRON SERPL-MCNC: 98 UG/DL (ref 59–158)
LYMPHOCYTES # BLD: 2.9 K/UL (ref 1.5–4)
LYMPHOCYTES NFR BLD: 26.4 %
MCH RBC QN AUTO: 29.7 PG (ref 27–32)
MCHC RBC AUTO-ENTMCNC: 31.4 G/DL (ref 31–35)
MCV RBC AUTO: 94.6 FL (ref 80–100)
MICROALBUMIN UR DL<=1MG/L-MCNC: 2 MG/DL (ref 0–22)
MICROALBUMIN/CREAT UR: 10.9 MG/G (ref 0–30)
MONOCYTES # BLD: 0.6 K/UL (ref 0.2–0.8)
MONOCYTES NFR BLD: 5 %
NEUTROPHILS # BLD: 6.2 K/UL (ref 2–7.5)
NEUTS SEG NFR BLD: 56.3 %
PLATELET # BLD AUTO: 251 K/UL (ref 150–400)
PMV BLD AUTO: 11 FL (ref 6–10)
POTASSIUM SERPL-SCNC: 4.9 MMOL/L (ref 3.4–5.1)
PROT SERPL-MCNC: 6.3 G/DL (ref 6.4–8.3)
RBC # BLD AUTO: 4.24 M/UL (ref 4.5–6)
SODIUM SERPL-SCNC: 138 MMOL/L (ref 136–145)
TIBC SERPL-MCNC: 232 UG/DL (ref 250–450)
WBC # BLD AUTO: 11 K/UL (ref 4–11)

## 2024-06-12 PROCEDURE — 83540 ASSAY OF IRON: CPT

## 2024-06-12 PROCEDURE — 85025 COMPLETE CBC W/AUTO DIFF WBC: CPT

## 2024-06-12 PROCEDURE — 83550 IRON BINDING TEST: CPT

## 2024-06-12 PROCEDURE — 82570 ASSAY OF URINE CREATININE: CPT

## 2024-06-12 PROCEDURE — 82043 UR ALBUMIN QUANTITATIVE: CPT

## 2024-06-12 PROCEDURE — 82746 ASSAY OF FOLIC ACID SERUM: CPT

## 2024-06-12 PROCEDURE — 36415 COLL VENOUS BLD VENIPUNCTURE: CPT

## 2024-06-12 PROCEDURE — 80053 COMPREHEN METABOLIC PANEL: CPT

## 2024-06-12 PROCEDURE — 82728 ASSAY OF FERRITIN: CPT

## 2024-06-14 PROBLEM — E86.0 DEHYDRATION: Status: RESOLVED | Noted: 2024-05-15 | Resolved: 2024-06-14

## 2024-06-14 RX ORDER — SEMAGLUTIDE 1.34 MG/ML
INJECTION, SOLUTION SUBCUTANEOUS
Qty: 3 ML | Refills: 3 | OUTPATIENT
Start: 2024-06-14

## 2024-06-17 RX ORDER — ASCORBIC ACID 500 MG
500 TABLET ORAL DAILY
Qty: 90 TABLET | Refills: 1 | Status: SHIPPED | OUTPATIENT
Start: 2024-06-17

## 2024-06-17 RX ORDER — FOLIC ACID 1 MG/1
1 TABLET ORAL DAILY
Qty: 90 TABLET | Refills: 1 | Status: SHIPPED | OUTPATIENT
Start: 2024-06-17

## 2024-06-17 RX ORDER — OMEPRAZOLE 40 MG/1
CAPSULE, DELAYED RELEASE ORAL
Qty: 90 CAPSULE | Refills: 1 | Status: SHIPPED | OUTPATIENT
Start: 2024-06-17

## 2024-06-17 RX ORDER — FERROUS SULFATE 324(65)MG
324 TABLET, DELAYED RELEASE (ENTERIC COATED) ORAL
Qty: 90 TABLET | Refills: 1 | Status: SHIPPED | OUTPATIENT
Start: 2024-06-17

## 2024-07-03 ENCOUNTER — CARE COORDINATION (OUTPATIENT)
Dept: PRIMARY CARE CLINIC | Age: 69
End: 2024-07-03

## 2024-07-24 ENCOUNTER — OFFICE VISIT (OUTPATIENT)
Dept: ORTHOPEDIC SURGERY | Facility: CLINIC | Age: 69
End: 2024-07-24
Payer: MEDICARE

## 2024-07-24 VITALS — WEIGHT: 315 LBS | HEIGHT: 72 IN | TEMPERATURE: 97.6 F | BODY MASS INDEX: 42.66 KG/M2

## 2024-07-24 DIAGNOSIS — M75.42 SUBACROMIAL IMPINGEMENT OF LEFT SHOULDER: ICD-10-CM

## 2024-07-24 DIAGNOSIS — M67.912 TENDINOPATHY OF LEFT ROTATOR CUFF: Primary | ICD-10-CM

## 2024-07-24 DIAGNOSIS — M19.012 ARTHRITIS OF LEFT GLENOHUMERAL JOINT: ICD-10-CM

## 2024-07-24 RX ORDER — LIDOCAINE HYDROCHLORIDE 20 MG/ML
2 INJECTION, SOLUTION INFILTRATION; PERINEURAL
Status: COMPLETED | OUTPATIENT
Start: 2024-07-24 | End: 2024-07-24

## 2024-07-24 RX ORDER — METHYLPREDNISOLONE ACETATE 40 MG/ML
40 INJECTION, SUSPENSION INTRA-ARTICULAR; INTRALESIONAL; INTRAMUSCULAR; SOFT TISSUE
Status: COMPLETED | OUTPATIENT
Start: 2024-07-24 | End: 2024-07-24

## 2024-07-24 RX ADMIN — METHYLPREDNISOLONE ACETATE 40 MG: 40 INJECTION, SUSPENSION INTRA-ARTICULAR; INTRALESIONAL; INTRAMUSCULAR; SOFT TISSUE at 10:56

## 2024-07-24 RX ADMIN — LIDOCAINE HYDROCHLORIDE 2 ML: 20 INJECTION, SOLUTION INFILTRATION; PERINEURAL at 10:56

## 2024-07-24 NOTE — PROGRESS NOTES
"    Office Note     Name: Thomas Briceño    : 1955     MRN: [id number]     Chief Complaint  Injections of the Left Shoulder (Last injection 24.)    Subjective     History of Present Illness:  Thomas Briceño is a 69 y.o. male here today for injection therapy.    Review of Systems     Past Medical History:   Diagnosis Date    Acid reflux     Anxiety     Arthritis     Chronic kidney disease, stage 3 (moderate)     Depression     Diabetes     Exertional shortness of breath 2018    Gout     Hyperlipidemia     Hypertension     Hypothyroidism     Kidney disorder          No past surgical history on file.    Allergies   Allergen Reactions    Atenolol Other (See Comments)     dizzy    Meloxicam Dizziness     Dizzy, unable to focus         Objective   Temp 97.6 °F (36.4 °C)   Ht 182.9 cm (72.01\")   Wt (!) 152 kg (334 lb)   BMI 45.29 kg/m²    Physical Exam    Skin exam stable with no erythema, ecchymosis or rash.  No new swelling.  No motor or sensory changes.  Distal pulse intact.    Large Joint Arthrocentesis: L subacromial bursa  Date/Time: 2024 10:56 AM  Consent given by: patient  Site marked: site marked  Timeout: Immediately prior to procedure a time out was called to verify the correct patient, procedure, equipment, support staff and site/side marked as required   Supporting Documentation  Indications: pain   Procedure Details  Location: shoulder - L subacromial bursa  Preparation: Patient was prepped and draped in the usual sterile fashion  Needle size: 22 G  Approach: posterior  Medications administered: 40 mg methylPREDNISolone acetate 40 MG/ML; 2 mL lidocaine 2%  Patient tolerance: patient tolerated the procedure well with no immediate complications        Assessment and Plan      Diagnosis Plan   1. Tendinopathy of left rotator cuff        2. Subacromial impingement of left shoulder        3. Arthritis of left glenohumeral joint            Discussion of orthopaedic goals and activities and " patient expressed appreciation.  Regular exercise as tolerated  Ice, heat, and/or modalities as beneficial  Watch for signs and symptoms of infection  Call or notify for any adverse effect from injection therapy    Recommendations:  Usual activities, routine exercise as tolerated, light physical work as tolerated, no strenuous activity.    Return in about 3 months (around 10/24/2024), or if symptoms worsen or fail to improve.  Patient agreeable to call or return sooner for any concerns.

## 2024-08-13 RX ORDER — SEMAGLUTIDE 1.34 MG/ML
INJECTION, SOLUTION SUBCUTANEOUS
Qty: 9 ML | Refills: 1 | OUTPATIENT
Start: 2024-08-13

## 2024-08-13 RX ORDER — MECLIZINE HYDROCHLORIDE 25 MG/1
TABLET ORAL
Qty: 270 TABLET | Refills: 0 | Status: SHIPPED | OUTPATIENT
Start: 2024-08-13

## 2024-09-03 ENCOUNTER — OFFICE VISIT (OUTPATIENT)
Dept: PRIMARY CARE CLINIC | Age: 69
End: 2024-09-03
Payer: MEDICARE

## 2024-09-03 VITALS
OXYGEN SATURATION: 97 % | DIASTOLIC BLOOD PRESSURE: 70 MMHG | BODY MASS INDEX: 44.76 KG/M2 | RESPIRATION RATE: 18 BRPM | HEART RATE: 75 BPM | WEIGHT: 315 LBS | SYSTOLIC BLOOD PRESSURE: 136 MMHG

## 2024-09-03 DIAGNOSIS — N18.31 STAGE 3A CHRONIC KIDNEY DISEASE (HCC): ICD-10-CM

## 2024-09-03 DIAGNOSIS — Z12.5 SCREENING FOR PROSTATE CANCER: ICD-10-CM

## 2024-09-03 DIAGNOSIS — E11.22 TYPE 2 DIABETES MELLITUS WITH STAGE 3A CHRONIC KIDNEY DISEASE, WITH LONG-TERM CURRENT USE OF INSULIN (HCC): Primary | ICD-10-CM

## 2024-09-03 DIAGNOSIS — E03.9 HYPOTHYROIDISM, UNSPECIFIED TYPE: ICD-10-CM

## 2024-09-03 DIAGNOSIS — I10 PRIMARY HYPERTENSION: ICD-10-CM

## 2024-09-03 DIAGNOSIS — N18.31 TYPE 2 DIABETES MELLITUS WITH STAGE 3A CHRONIC KIDNEY DISEASE, WITH LONG-TERM CURRENT USE OF INSULIN (HCC): Primary | ICD-10-CM

## 2024-09-03 DIAGNOSIS — Z79.4 TYPE 2 DIABETES MELLITUS WITH STAGE 3A CHRONIC KIDNEY DISEASE, WITH LONG-TERM CURRENT USE OF INSULIN (HCC): Primary | ICD-10-CM

## 2024-09-03 DIAGNOSIS — E78.5 HYPERLIPIDEMIA, UNSPECIFIED HYPERLIPIDEMIA TYPE: ICD-10-CM

## 2024-09-03 PROCEDURE — G8417 CALC BMI ABV UP PARAM F/U: HCPCS | Performed by: INTERNAL MEDICINE

## 2024-09-03 PROCEDURE — 3075F SYST BP GE 130 - 139MM HG: CPT | Performed by: INTERNAL MEDICINE

## 2024-09-03 PROCEDURE — 99213 OFFICE O/P EST LOW 20 MIN: CPT | Performed by: INTERNAL MEDICINE

## 2024-09-03 PROCEDURE — 3078F DIAST BP <80 MM HG: CPT | Performed by: INTERNAL MEDICINE

## 2024-09-03 PROCEDURE — G8427 DOCREV CUR MEDS BY ELIG CLIN: HCPCS | Performed by: INTERNAL MEDICINE

## 2024-09-03 PROCEDURE — 2022F DILAT RTA XM EVC RTNOPTHY: CPT | Performed by: INTERNAL MEDICINE

## 2024-09-03 PROCEDURE — 3017F COLORECTAL CA SCREEN DOC REV: CPT | Performed by: INTERNAL MEDICINE

## 2024-09-03 PROCEDURE — 1123F ACP DISCUSS/DSCN MKR DOCD: CPT | Performed by: INTERNAL MEDICINE

## 2024-09-03 PROCEDURE — 3051F HG A1C>EQUAL 7.0%<8.0%: CPT | Performed by: INTERNAL MEDICINE

## 2024-09-03 PROCEDURE — 1036F TOBACCO NON-USER: CPT | Performed by: INTERNAL MEDICINE

## 2024-09-03 ASSESSMENT — ENCOUNTER SYMPTOMS
EYE DISCHARGE: 0
SINUS PRESSURE: 0
SHORTNESS OF BREATH: 0
WHEEZING: 0
BACK PAIN: 0
NAUSEA: 0
VOMITING: 0
ABDOMINAL PAIN: 0
COUGH: 0

## 2024-09-03 NOTE — PROGRESS NOTES
SUBJECTIVE:    Patient ID: Alfredo Perez is a 69 y.o.male.    Chief Complaint   Patient presents with    Diabetes    Hypertension         HPI:  Patient has had diabetes for the past several years.  He has been compliant with the medications and denies any side effects from it. He has been monitoring fingersticks on a daily basis.  His fingerstick range is between 120-140, he reports a few readings being 230's. He denies any hypoglycemic symptoms. He has been following a diabetic diet and has been active.  His last eye exam was ,ore than a year ago.  He is using Ozempic. He reports not using Lantus    Patient has had hypertension for several years. He has been compliant with taking medications, without side effects from it. He has been following a low-sodium diet.  Weight is up 5 pounds, compared to last visit. His blood pressure is stable at this time.    Patient's medications, allergies, past medical, surgical, social and family histories were reviewed and updated as appropriate in electronic medical record.        Outpatient Medications Marked as Taking for the 9/3/24 encounter (Office Visit) with Starr Morrison MD   Medication Sig Dispense Refill    meclizine (ANTIVERT) 25 MG tablet TAKE 1 TABLET THREE TIMES DAILY AS NEEDED FOR DIZZINESS 270 tablet 0    omeprazole (PRILOSEC) 40 MG delayed release capsule Take 1 capsule by mouth daily. For stomach 90 capsule 1    ferrous sulfate 324 (65 Fe) MG EC tablet Take 1 tablet by mouth daily (with breakfast) 90 tablet 1    folic acid (FOLVITE) 1 MG tablet Take 1 tablet by mouth daily 90 tablet 1    ascorbic acid (VITAMIN C) 500 MG tablet Take 1 tablet by mouth daily 90 tablet 1    Semaglutide, 1 MG/DOSE, (OZEMPIC, 1 MG/DOSE,) 4 MG/3ML SOPN sc injection Inject 1 mg into the skin once a week 9 mL 1    sublingual tablet cyanocobalamin 2500 MCG SUBL Place 1 tablet under the tongue daily 90 tablet 1    sertraline (ZOLOFT) 50 MG tablet Take 1 tablet by mouth daily 90 tablet 1

## 2024-09-06 PROBLEM — M62.838 SPASM OF MUSCLE: Status: RESOLVED | Noted: 2024-01-31 | Resolved: 2024-09-06

## 2024-09-06 PROBLEM — D72.829 LEUKOCYTOSIS: Status: RESOLVED | Noted: 2017-06-29 | Resolved: 2024-09-06

## 2024-09-06 PROBLEM — R65.20 SEPSIS WITH ACUTE HYPOXIC RESPIRATORY FAILURE (HCC): Status: RESOLVED | Noted: 2023-10-21 | Resolved: 2024-09-06

## 2024-09-06 PROBLEM — J96.01 SEPSIS WITH ACUTE HYPOXIC RESPIRATORY FAILURE (HCC): Status: RESOLVED | Noted: 2023-10-21 | Resolved: 2024-09-06

## 2024-09-06 PROBLEM — S39.012A STRAIN OF BACK: Status: RESOLVED | Noted: 2024-01-31 | Resolved: 2024-09-06

## 2024-09-06 PROBLEM — R50.9 FEVER: Status: RESOLVED | Noted: 2017-06-29 | Resolved: 2024-09-06

## 2024-09-06 PROBLEM — J96.21 ACUTE ON CHRONIC RESPIRATORY FAILURE WITH HYPOXIA: Status: RESOLVED | Noted: 2023-10-20 | Resolved: 2024-09-06

## 2024-09-06 PROBLEM — R41.82 ALTERED MENTAL STATUS: Status: RESOLVED | Noted: 2017-06-29 | Resolved: 2024-09-06

## 2024-09-06 PROBLEM — A41.9 SEPSIS WITH ACUTE HYPOXIC RESPIRATORY FAILURE (HCC): Status: RESOLVED | Noted: 2023-10-21 | Resolved: 2024-09-06

## 2024-09-06 PROBLEM — U07.1 PNEUMONIA DUE TO COVID-19 VIRUS: Status: RESOLVED | Noted: 2023-12-13 | Resolved: 2024-09-06

## 2024-09-06 PROBLEM — J18.9 COMMUNITY ACQUIRED PNEUMONIA OF LEFT LOWER LOBE OF LUNG: Status: RESOLVED | Noted: 2023-10-21 | Resolved: 2024-09-06

## 2024-09-06 PROBLEM — N17.9 ACUTE KIDNEY INJURY SUPERIMPOSED ON CKD (HCC): Status: RESOLVED | Noted: 2023-10-21 | Resolved: 2024-09-06

## 2024-09-06 PROBLEM — J12.82 PNEUMONIA DUE TO COVID-19 VIRUS: Status: RESOLVED | Noted: 2023-12-13 | Resolved: 2024-09-06

## 2024-09-06 PROBLEM — N18.9 ACUTE KIDNEY INJURY SUPERIMPOSED ON CKD (HCC): Status: RESOLVED | Noted: 2023-10-21 | Resolved: 2024-09-06

## 2024-09-17 ENCOUNTER — PREP FOR SURGERY (OUTPATIENT)
Dept: OTHER | Facility: HOSPITAL | Age: 69
End: 2024-09-17
Payer: MEDICARE

## 2024-09-17 DIAGNOSIS — H25.11 NUCLEAR SCLEROTIC CATARACT OF RIGHT EYE: Primary | ICD-10-CM

## 2024-09-17 RX ORDER — SODIUM CHLORIDE 0.9 % (FLUSH) 0.9 %
10 SYRINGE (ML) INJECTION EVERY 12 HOURS SCHEDULED
Status: CANCELLED | OUTPATIENT
Start: 2024-09-17

## 2024-09-17 RX ORDER — PREDNISOLONE ACETATE 10 MG/ML
1 SUSPENSION/ DROPS OPHTHALMIC SEE ADMIN INSTRUCTIONS
Status: CANCELLED | OUTPATIENT
Start: 2024-09-17

## 2024-09-17 RX ORDER — CYCLOPENT/TROPIC/PHEN/KETR/WAT 1%-1%-2.5%
1 DROPS (EA) OPHTHALMIC (EYE)
Status: CANCELLED | OUTPATIENT
Start: 2024-09-17 | End: 2024-09-17

## 2024-09-17 RX ORDER — SODIUM CHLORIDE 9 MG/ML
40 INJECTION, SOLUTION INTRAVENOUS AS NEEDED
Status: CANCELLED | OUTPATIENT
Start: 2024-09-17

## 2024-09-17 RX ORDER — SODIUM CHLORIDE 0.9 % (FLUSH) 0.9 %
1-10 SYRINGE (ML) INJECTION AS NEEDED
Status: CANCELLED | OUTPATIENT
Start: 2024-09-17

## 2024-09-17 RX ORDER — TETRACAINE HYDROCHLORIDE 5 MG/ML
1 SOLUTION OPHTHALMIC SEE ADMIN INSTRUCTIONS
Status: CANCELLED | OUTPATIENT
Start: 2024-09-17

## 2024-09-19 RX ORDER — TAMSULOSIN HYDROCHLORIDE 0.4 MG/1
0.4 CAPSULE ORAL DAILY
Qty: 90 CAPSULE | Refills: 1 | Status: SHIPPED | OUTPATIENT
Start: 2024-09-19

## 2024-09-19 RX ORDER — HYDRALAZINE HYDROCHLORIDE 100 MG/1
TABLET, FILM COATED ORAL
Qty: 90 TABLET | Refills: 3 | Status: SHIPPED | OUTPATIENT
Start: 2024-09-19

## 2024-09-19 RX ORDER — FINASTERIDE 5 MG/1
5 TABLET, FILM COATED ORAL DAILY
Qty: 90 TABLET | Refills: 1 | Status: SHIPPED | OUTPATIENT
Start: 2024-09-19

## 2024-09-23 RX ORDER — ALLOPURINOL 100 MG/1
100 TABLET ORAL DAILY
Qty: 90 TABLET | Refills: 1 | Status: SHIPPED | OUTPATIENT
Start: 2024-09-23

## 2024-09-25 ENCOUNTER — HOSPITAL ENCOUNTER (OUTPATIENT)
Facility: HOSPITAL | Age: 69
Discharge: HOME | End: 2024-09-25
Payer: MEDICARE

## 2024-09-25 DIAGNOSIS — E78.5 HYPERLIPIDEMIA, UNSPECIFIED HYPERLIPIDEMIA TYPE: ICD-10-CM

## 2024-09-25 DIAGNOSIS — E11.22 TYPE 2 DIABETES MELLITUS WITH STAGE 3A CHRONIC KIDNEY DISEASE, WITH LONG-TERM CURRENT USE OF INSULIN (HCC): ICD-10-CM

## 2024-09-25 DIAGNOSIS — N18.31 STAGE 3A CHRONIC KIDNEY DISEASE (HCC): ICD-10-CM

## 2024-09-25 DIAGNOSIS — I10 PRIMARY HYPERTENSION: Primary | ICD-10-CM

## 2024-09-25 DIAGNOSIS — I10 PRIMARY HYPERTENSION: ICD-10-CM

## 2024-09-25 DIAGNOSIS — J96.01 SEPSIS WITH ACUTE HYPOXIC RESPIRATORY FAILURE WITHOUT SEPTIC SHOCK, DUE TO UNSPECIFIED ORGANISM (HCC): ICD-10-CM

## 2024-09-25 DIAGNOSIS — Z79.4 TYPE 2 DIABETES MELLITUS WITH STAGE 3A CHRONIC KIDNEY DISEASE, WITH LONG-TERM CURRENT USE OF INSULIN (HCC): ICD-10-CM

## 2024-09-25 DIAGNOSIS — Z12.5 SCREENING FOR PROSTATE CANCER: ICD-10-CM

## 2024-09-25 DIAGNOSIS — D64.9 ANEMIA, UNSPECIFIED TYPE: ICD-10-CM

## 2024-09-25 DIAGNOSIS — E03.9 HYPOTHYROIDISM, UNSPECIFIED TYPE: ICD-10-CM

## 2024-09-25 DIAGNOSIS — N18.31 TYPE 2 DIABETES MELLITUS WITH STAGE 3A CHRONIC KIDNEY DISEASE, WITH LONG-TERM CURRENT USE OF INSULIN (HCC): ICD-10-CM

## 2024-09-25 DIAGNOSIS — A41.9 SEPSIS WITH ACUTE HYPOXIC RESPIRATORY FAILURE WITHOUT SEPTIC SHOCK, DUE TO UNSPECIFIED ORGANISM (HCC): ICD-10-CM

## 2024-09-25 DIAGNOSIS — R65.20 SEPSIS WITH ACUTE HYPOXIC RESPIRATORY FAILURE WITHOUT SEPTIC SHOCK, DUE TO UNSPECIFIED ORGANISM (HCC): ICD-10-CM

## 2024-09-25 LAB
ALBUMIN SERPL-MCNC: 4 G/DL (ref 3.4–4.8)
ALBUMIN/GLOB SERPL: 1.7 {RATIO} (ref 0.8–2)
ALP SERPL-CCNC: 94 U/L (ref 25–100)
ALT SERPL-CCNC: 13 U/L (ref 4–36)
ANION GAP SERPL CALCULATED.3IONS-SCNC: 11 MMOL/L (ref 3–16)
AST SERPL-CCNC: 11 U/L (ref 8–33)
BASOPHILS # BLD: 0.1 K/UL (ref 0–0.1)
BASOPHILS NFR BLD: 1.4 %
BILIRUB SERPL-MCNC: 0.3 MG/DL (ref 0.3–1.2)
BUN SERPL-MCNC: 13 MG/DL (ref 6–20)
CALCIUM SERPL-MCNC: 8.9 MG/DL (ref 8.5–10.5)
CHLORIDE SERPL-SCNC: 101 MMOL/L (ref 98–107)
CHOLEST SERPL-MCNC: 137 MG/DL (ref 0–200)
CO2 SERPL-SCNC: 24 MMOL/L (ref 20–30)
CREAT SERPL-MCNC: 1.2 MG/DL (ref 0.4–1.2)
EOSINOPHIL # BLD: 1.6 K/UL (ref 0–0.4)
EOSINOPHIL NFR BLD: 15.9 %
ERYTHROCYTE [DISTWIDTH] IN BLOOD BY AUTOMATED COUNT: 11.9 % (ref 11–16)
GFR SERPLBLD CREATININE-BSD FMLA CKD-EPI: 64 ML/MIN/{1.73_M2}
GLOBULIN SER CALC-MCNC: 2.4 G/DL
GLUCOSE SERPL-MCNC: 208 MG/DL (ref 74–106)
HBA1C MFR BLD: 6.9 %
HCT VFR BLD AUTO: 39.3 % (ref 40–54)
HDLC SERPL-MCNC: 27 MG/DL (ref 40–60)
HGB BLD-MCNC: 13.1 G/DL (ref 13–18)
IMM GRANULOCYTES # BLD: 0 K/UL
IMM GRANULOCYTES NFR BLD: 0.4 % (ref 0–5)
LDLC SERPL CALC-MCNC: 68 MG/DL
LYMPHOCYTES # BLD: 2.7 K/UL (ref 1.5–4)
LYMPHOCYTES NFR BLD: 26.4 %
MCH RBC QN AUTO: 30.5 PG (ref 27–32)
MCHC RBC AUTO-ENTMCNC: 33.3 G/DL (ref 31–35)
MCV RBC AUTO: 91.6 FL (ref 80–100)
MONOCYTES # BLD: 0.5 K/UL (ref 0.2–0.8)
MONOCYTES NFR BLD: 4.6 %
NEUTROPHILS # BLD: 5.2 K/UL (ref 2–7.5)
NEUTS SEG NFR BLD: 51.3 %
PLATELET # BLD AUTO: 236 K/UL (ref 150–400)
PMV BLD AUTO: 10.4 FL (ref 6–10)
POTASSIUM SERPL-SCNC: 4.4 MMOL/L (ref 3.4–5.1)
PROT SERPL-MCNC: 6.4 G/DL (ref 6.4–8.3)
PSA SERPL DL<=0.01 NG/ML-MCNC: 1.3 NG/ML (ref 0–4)
RBC # BLD AUTO: 4.29 M/UL (ref 4.5–6)
SODIUM SERPL-SCNC: 136 MMOL/L (ref 136–145)
TRIGL SERPL-MCNC: 211 MG/DL (ref 0–249)
TSH SERPL DL<=0.005 MIU/L-ACNC: 2.69 UIU/ML (ref 0.27–4.2)
VIT B12 SERPL-MCNC: 1102 PG/ML (ref 211–911)
VLDLC SERPL CALC-MCNC: 42 MG/DL
WBC # BLD AUTO: 10.1 K/UL (ref 4–11)

## 2024-09-25 PROCEDURE — 80053 COMPREHEN METABOLIC PANEL: CPT

## 2024-09-25 PROCEDURE — 83036 HEMOGLOBIN GLYCOSYLATED A1C: CPT

## 2024-09-25 PROCEDURE — 82607 VITAMIN B-12: CPT

## 2024-09-25 PROCEDURE — 80061 LIPID PANEL: CPT

## 2024-09-25 PROCEDURE — 84153 ASSAY OF PSA TOTAL: CPT

## 2024-09-25 PROCEDURE — 85025 COMPLETE CBC W/AUTO DIFF WBC: CPT

## 2024-09-25 PROCEDURE — 84443 ASSAY THYROID STIM HORMONE: CPT

## 2024-09-25 NOTE — PRE-PROCEDURE INSTRUCTIONS
PAT phone history completed with patient for upcoming procedure on 9/26/24.    PAT PASS reviewed with patient and he/she verbalized understanding of the following:     Do not eat or drink anything after midnight the night before procedure unless otherwise instructed by physician/surgeon's office, this includes no gum, candy, mints, tobacco products or e-cigarettes.  Do not shave the area to be operated on at least 48 hours prior to procedure.  Do not wear makeup, lotion, hair products, or nail polish.  Do not wear any jewelry and remove all piercings.  Do not wear any adhesive if you wear dentures.  Do not wear contacts; bring in glasses if needed.  Only take medications on the morning of procedure as instructed by PAT nurse per anesthesia guidelines or as instructed by physician's office.   If you are on any blood thinners reach out to the physician/surgeon's office for instructions on when/if they will need to be stopped prior to procedure.   Bring in picture ID and insurance card, advanced directive copies if applicable, CPAP/BIPAP/Inhalers if indicated morning of procedure, leave any other valuables at home.  Ensure you have arranged for someone to drive you home the day of your procedure and someone to care for you at home afterwards. It is recommended that you do not drive, drink alcohol, or make any major legal decisions for at least 24 hours after your procedure is complete.    Instructions given on hospital entrance and registration location.

## 2024-09-26 ENCOUNTER — ANESTHESIA EVENT (OUTPATIENT)
Dept: PERIOP | Facility: HOSPITAL | Age: 69
End: 2024-09-26
Payer: MEDICARE

## 2024-09-26 ENCOUNTER — ANESTHESIA (OUTPATIENT)
Dept: PERIOP | Facility: HOSPITAL | Age: 69
End: 2024-09-26
Payer: MEDICARE

## 2024-09-26 ENCOUNTER — HOSPITAL ENCOUNTER (OUTPATIENT)
Facility: HOSPITAL | Age: 69
Setting detail: HOSPITAL OUTPATIENT SURGERY
Discharge: HOME | End: 2024-09-26
Attending: OPHTHALMOLOGY | Admitting: OPHTHALMOLOGY
Payer: MEDICARE

## 2024-09-26 VITALS
DIASTOLIC BLOOD PRESSURE: 82 MMHG | WEIGHT: 315 LBS | OXYGEN SATURATION: 93 % | TEMPERATURE: 97.8 F | BODY MASS INDEX: 44.07 KG/M2 | SYSTOLIC BLOOD PRESSURE: 137 MMHG | HEART RATE: 73 BPM | RESPIRATION RATE: 16 BRPM

## 2024-09-26 DIAGNOSIS — H25.11 NUCLEAR SCLEROTIC CATARACT OF RIGHT EYE: ICD-10-CM

## 2024-09-26 LAB — GLUCOSE BLDC GLUCOMTR-MCNC: 174 MG/DL (ref 70–130)

## 2024-09-26 PROCEDURE — V2632 POST CHMBR INTRAOCULAR LENS: HCPCS | Performed by: OPHTHALMOLOGY

## 2024-09-26 PROCEDURE — 25810000003 LACTATED RINGERS PER 1000 ML: Performed by: OPHTHALMOLOGY

## 2024-09-26 PROCEDURE — 25010000002 MIDAZOLAM PER 1MG: Performed by: NURSE ANESTHETIST, CERTIFIED REGISTERED

## 2024-09-26 PROCEDURE — 82948 REAGENT STRIP/BLOOD GLUCOSE: CPT | Performed by: OPHTHALMOLOGY

## 2024-09-26 PROCEDURE — 25010000002 FENTANYL CITRATE (PF) 50 MCG/ML SOLUTION PREFILLED SYRINGE: Performed by: NURSE ANESTHETIST, CERTIFIED REGISTERED

## 2024-09-26 DEVICE — LENS MONOFOCAL IQ CC60WF190: Type: IMPLANTABLE DEVICE | Site: POSTERIOR CHAMBER | Status: FUNCTIONAL

## 2024-09-26 RX ORDER — SODIUM CHLORIDE, SODIUM LACTATE, POTASSIUM CHLORIDE, CALCIUM CHLORIDE 600; 310; 30; 20 MG/100ML; MG/100ML; MG/100ML; MG/100ML
1000 INJECTION, SOLUTION INTRAVENOUS CONTINUOUS
Status: DISCONTINUED | OUTPATIENT
Start: 2024-09-26 | End: 2024-09-26 | Stop reason: HOSPADM

## 2024-09-26 RX ORDER — LISINOPRIL 20 MG/1
20 TABLET ORAL DAILY
Qty: 90 TABLET | Refills: 1 | Status: SHIPPED | OUTPATIENT
Start: 2024-09-26

## 2024-09-26 RX ORDER — SODIUM CHLORIDE 0.9 % (FLUSH) 0.9 %
10 SYRINGE (ML) INJECTION AS NEEDED
Status: DISCONTINUED | OUTPATIENT
Start: 2024-09-26 | End: 2024-09-26 | Stop reason: HOSPADM

## 2024-09-26 RX ORDER — CYCLOPENT/TROPIC/PHEN/KETR/WAT 1%-1%-2.5%
1 DROPS (EA) OPHTHALMIC (EYE)
Status: COMPLETED | OUTPATIENT
Start: 2024-09-26 | End: 2024-09-26

## 2024-09-26 RX ORDER — BALANCED SALT SOLUTION 6.4; .75; .48; .3; 3.9; 1.7 MG/ML; MG/ML; MG/ML; MG/ML; MG/ML; MG/ML
SOLUTION OPHTHALMIC AS NEEDED
Status: DISCONTINUED | OUTPATIENT
Start: 2024-09-26 | End: 2024-09-26 | Stop reason: HOSPADM

## 2024-09-26 RX ORDER — TETRACAINE HYDROCHLORIDE 5 MG/ML
SOLUTION OPHTHALMIC AS NEEDED
Status: DISCONTINUED | OUTPATIENT
Start: 2024-09-26 | End: 2024-09-26 | Stop reason: HOSPADM

## 2024-09-26 RX ORDER — MIDAZOLAM HYDROCHLORIDE 2 MG/2ML
INJECTION, SOLUTION INTRAMUSCULAR; INTRAVENOUS AS NEEDED
Status: DISCONTINUED | OUTPATIENT
Start: 2024-09-26 | End: 2024-09-26 | Stop reason: SURG

## 2024-09-26 RX ORDER — TETRACAINE HYDROCHLORIDE 5 MG/ML
1 SOLUTION OPHTHALMIC SEE ADMIN INSTRUCTIONS
Status: COMPLETED | OUTPATIENT
Start: 2024-09-26 | End: 2024-09-26

## 2024-09-26 RX ORDER — NEOMYCIN SULFATE, POLYMYXIN B SULFATE, AND DEXAMETHASONE 3.5; 10000; 1 MG/G; [USP'U]/G; MG/G
OINTMENT OPHTHALMIC AS NEEDED
Status: DISCONTINUED | OUTPATIENT
Start: 2024-09-26 | End: 2024-09-26 | Stop reason: HOSPADM

## 2024-09-26 RX ORDER — SODIUM CHLORIDE 9 MG/ML
40 INJECTION, SOLUTION INTRAVENOUS AS NEEDED
Status: DISCONTINUED | OUTPATIENT
Start: 2024-09-26 | End: 2024-09-26 | Stop reason: HOSPADM

## 2024-09-26 RX ORDER — LIDOCAINE HYDROCHLORIDE 40 MG/ML
INJECTION, SOLUTION RETROBULBAR AS NEEDED
Status: DISCONTINUED | OUTPATIENT
Start: 2024-09-26 | End: 2024-09-26 | Stop reason: HOSPADM

## 2024-09-26 RX ORDER — SODIUM CHLORIDE 0.9 % (FLUSH) 0.9 %
10 SYRINGE (ML) INJECTION EVERY 12 HOURS SCHEDULED
Status: DISCONTINUED | OUTPATIENT
Start: 2024-09-26 | End: 2024-09-26 | Stop reason: HOSPADM

## 2024-09-26 RX ORDER — PREDNISOLONE ACETATE 10 MG/ML
1 SUSPENSION/ DROPS OPHTHALMIC SEE ADMIN INSTRUCTIONS
Status: DISCONTINUED | OUTPATIENT
Start: 2024-09-26 | End: 2024-09-26 | Stop reason: HOSPADM

## 2024-09-26 RX ORDER — ACETAZOLAMIDE 500 MG/1
500 CAPSULE, EXTENDED RELEASE ORAL ONCE
Status: COMPLETED | OUTPATIENT
Start: 2024-09-26 | End: 2024-09-26

## 2024-09-26 RX ORDER — PREDNISOLONE ACETATE 10 MG/ML
SUSPENSION/ DROPS OPHTHALMIC AS NEEDED
Status: DISCONTINUED | OUTPATIENT
Start: 2024-09-26 | End: 2024-09-26 | Stop reason: HOSPADM

## 2024-09-26 RX ORDER — ALBUTEROL SULFATE 90 UG/1
2 INHALANT RESPIRATORY (INHALATION) EVERY 4 HOURS PRN
Qty: 1 EACH | Refills: 3 | Status: SHIPPED | OUTPATIENT
Start: 2024-09-26

## 2024-09-26 RX ORDER — FENTANYL CITRATE 50 UG/ML
INJECTION, SOLUTION INTRAMUSCULAR; INTRAVENOUS AS NEEDED
Status: DISCONTINUED | OUTPATIENT
Start: 2024-09-26 | End: 2024-09-26 | Stop reason: SURG

## 2024-09-26 RX ORDER — SODIUM CHLORIDE 0.9 % (FLUSH) 0.9 %
1-10 SYRINGE (ML) INJECTION AS NEEDED
Status: DISCONTINUED | OUTPATIENT
Start: 2024-09-26 | End: 2024-09-26 | Stop reason: HOSPADM

## 2024-09-26 RX ORDER — POVIDONE-IODINE 5 %
SOLUTION, NON-ORAL OPHTHALMIC (EYE) AS NEEDED
Status: DISCONTINUED | OUTPATIENT
Start: 2024-09-26 | End: 2024-09-26 | Stop reason: HOSPADM

## 2024-09-26 RX ADMIN — Medication 1 DROP: at 08:22

## 2024-09-26 RX ADMIN — SODIUM CHLORIDE, POTASSIUM CHLORIDE, SODIUM LACTATE AND CALCIUM CHLORIDE 1000 ML: 600; 310; 30; 20 INJECTION, SOLUTION INTRAVENOUS at 08:21

## 2024-09-26 RX ADMIN — Medication 1 DROP: at 08:18

## 2024-09-26 RX ADMIN — ACETAZOLAMIDE 500 MG: 500 CAPSULE ORAL at 10:25

## 2024-09-26 RX ADMIN — TETRACAINE HYDROCHLORIDE 1 DROP: 5 SOLUTION OPHTHALMIC at 08:16

## 2024-09-26 RX ADMIN — FENTANYL CITRATE 50 MCG: 50 INJECTION, SOLUTION INTRAMUSCULAR; INTRAVENOUS at 10:02

## 2024-09-26 RX ADMIN — Medication 1 DROP: at 08:27

## 2024-09-26 RX ADMIN — TETRACAINE HYDROCHLORIDE 1 DROP: 5 SOLUTION OPHTHALMIC at 08:15

## 2024-09-26 RX ADMIN — MIDAZOLAM HYDROCHLORIDE 2 MG: 1 INJECTION, SOLUTION INTRAMUSCULAR; INTRAVENOUS at 10:02

## 2024-09-26 NOTE — DISCHARGE INSTRUCTIONS
No pushing, pulling, tugging,  heavy lifting, or strenuous activity.  No major decision making, driving, or drinking alcoholic beverages for 24 hours. ( due to the medications you have  received)  Always use good hand hygiene/washing techniques.  NO driving while taking pain medications.    * if you have an incision:  Check your incision area every day for signs of infection.   Check for:  * more redness, swelling, or pain  *more fluid or blood  *warmth  *pus or bad smellTo assist you in voiding:  Drink plenty of fluids  Listen to running water while attempting to void.    If you are unable to urinate and you have an uncomfortable urge to void or it has been   6 hours since you were discharged, return to the Emergency Room            [address] [address]  (P): [phone]           (F): [phone]    Thomas Briceño  PATIENT NAME:__________________________________    Right Eye    POST OPERATIVE INSTRUCTIONS    You have received anesthesia today. DO NOT drive, drink alcohol, sign legal documents.   After surgery, your eye will not hurt. It may feel scratchy, sticky or uncomfortable. Your eye will be sensitive to light.  Most people see better 1-3 days after the procedure, but it could take 3 weeks to get the full benefits and reach your visual potential. If your vision is blurry for a few days it is normal, and means you may have swelling outside or inside the eye. For some patients, a bubble is placed and there will be blurriness.   You should receive a post-op kit with tape and an eye shield. Wear the shield for the first 3 nights after surgery to keep you from rubbing the eye.  Most people are able to return to their normal routine 1-3 days after surgery, however, due to the brain adjusting to your new vision you may have trouble judging distances and want to be careful when driving and going up and downstairs.   You can shower and wash your hair the day after surgery. Keep water, shampoo,  hair spray and shaving lotion out of the eye, especially for the first week.  During the first week, you should AVOID:   Rubbing or putting pressure on your eye.  Eye make-up, face cream or lotion, hair coloring or perms  Strenuous activities, such as running or lifting weights, as to avoid sweat from getting in the eye. Avoid swimming, hot tubs, fumes or usman conditions.   Keep your head above your heart (no hanging the head down for periods of time).  Some discomfort and blurred vision after surgery are normal, but if you have any unusual pain, swelling, bleeding or sudden decrease in vision, contact our office immediately. Emergency assistance is available at any time by calling:    Dr. Evelio Gao    [phone]    If unable to reach call [facility] @ [phone]      POST OPERATIVE DROP INSTRUCTIONS  You have been prescribed eye drops to use after surgery, please follow these instructions:  PLACE A EVELIO IN THE DAY COLUMN EACH TIME YOU USE TO KEEP ON SCHEDULE. WAIT 5 MINUTES IN BETWEEN DROPS    Prednisolone (PINK TOP) SHAKE WELL BEFORE USE   GIVEN TO YOU BY THE HOSPITAL    WEEK 1-USE 4  (FOUR) TIMES A DAY DAY 1   DAY 2 DAY 3 DAY 4 DAY 5 DAY 6 DAY 7     WEEK 2-USE 3 (THREE)  TIMES A DAY DAY 1 DAY 2 DAY 3 DAY 4 DAY 5 DAY 6 DAY 7     WEEK 3-USE 2 (TWO) TIMES A DAY DAY 1 DAY 2 DAY 3 DAY 4 DAY 5 DAY 6 DAY 7     WEEK 4-USE 1 (ONE)TIME A DAY DAY 1 DAY 2 DAY 3 DAY 4 DAY 5 DAY 6 DAY 7         Ketorolac (GRAY TOP) PRESCRIBED TO YOUR PHARMACY    WEEK 1-USE 4  (FOUR) TIMES A DAY DAY 1   DAY 2 DAY 3 DAY 4 DAY 5 DAY 6 DAY 7     WEEK 2-USE 3 (THREE)  TIMES A DAY DAY 1 DAY 2 DAY 3 DAY 4 DAY 5 DAY 6 DAY 7     WEEK 3-USE 2 (TWO) TIMES A DAY DAY 1 DAY 2 DAY 3 DAY 4 DAY 5 DAY 6 DAY 7     WEEK 4-USE 1 (ONE)TIME A DAY DAY 1 DAY 2 DAY 3 DAY 4 DAY 5 DAY 6 DAY 7       Moxifloxacin (TAN TOP) PRESCRIBED TO YOUR PHARMACY    WEEK 1-USE 4   (FOUR) TIMES A DAY DAY 1   DAY 2 DAY 3 DAY 4 DAY 5 DAY 6 DAY 7

## 2024-09-26 NOTE — OP NOTE
OPERATIVE NOTE    Date of Procedure: 9/26/2024  Patient Name: Thomas Briceño  Patient MRN: [id number]  YOB: 1955     Preoperative Diagnosis: Right nuclear sclerotic cataract.     Postoperative Diagnosis: Right nuclear sclerotic cataract.     Procedure Performed: Phacoemulsification with implantation of a foldable posterior chamber intraocular lens, Right eye.     Surgeon: Justin Gao MD    Anesthesia:  Monitored Anesthesia Care (MAC)      Brief History and Indication: The patient presents with a history of past progressive loss of vision.  Patient was diagnosed with cataract and requests removal for increased ability to read and see.     Operation Description: The patient was taken to the OR and prepped and draped in the usual sterile ophthalmic fashion. A lid speculum was placed in the eye.  A #75 blade was then used to make a stab incision two o’clock hours from the intended temporal clear cornea groove. The anterior chamber was then inflated with a Viscoelastic. A metal microkeratome blade was then used to enter the anterior chamber at the temporal clear cornea site. A three level tunnel incision was made. A curvilinear capsulorrhexis was then performed with a bent cystotome needle and capsulorrhexis forceps.  BSS on a 30 gauge bent cannula was used to hydro-dissect, and hydro-delineate the lens. Good fluid waves and hydro-delineation were noted. Phacoemulsification was then used to remove nuclear material without complications. The residual cortical and lenticular material was then removed with irrigation and aspiration. Viscoelastics were then used to inflate the bag in a soft shell technique. A PCIOL was injected into the bag. Post-implantation, there were no rents or tears in the bag and the lens was noted to be stable and centered. The residual Viscoelastic was then removed with irrigation and aspiration.  The wound was checked and found to be without leaks. Therefore a Polydex ointment and one  drop of Durezol eye drop was placed in the eye.     Implant Information:   Implant Name Type Inv. Item Serial No.  Lot No. LRB No. Used Action   LENS MONOFOCAL IQ [id number] - [id number] - [id number] Implant LENS MONOFOCAL IQ [id number]  Right 1 Implanted       Complications: None    Estimated Blood Loss:  Less than 1 cc.       []   Changed to complex procedure due to: []  hypermature, white cataract. Blue dye was used. []   small iris. A Malyugin Ring was used.    Discharge and Condition  The patient was transported to same day surgery in excellent condition and scheduled for follow-up tomorrow morning. The patient was given instructions on use of eye drops for the operative eye and was specifically instructed to call Dr. Gao at his office or home for any nausea, vomiting, headache, decreased visual acuity, or pain, or if the patient had any general concerns.    Justin Gao MD  9/26/2024

## 2024-09-26 NOTE — H&P
[facility]         History and Physical    Patient Name: Thomas Briceño  MRN: [id number]  : 1955  Gender: male     HPI: Patient complaint of PPLOV Right eye diagnosed with cataract. Patient requests PHACO PCIOL for Increase of VA/ADL.    History:    Past Medical History:   Diagnosis Date    Acid reflux     Anxiety     Arthritis     Chronic kidney disease, stage 3 (moderate)     Depression     Diabetes     Exertional shortness of breath 2018    Family history of malignant hyperthermia     pt states his grandaughter had it - when asked if her body temperature was critically elevated, pt answered yes, 24    Gout     Hyperlipidemia     Hypertension     Hypothyroidism     Sleep apnea     uses CPAP sometimes       Past Surgical History:   Procedure Laterality Date    NO PAST SURGERIES         Social History     Socioeconomic History    Marital status:    Tobacco Use    Smoking status: Never     Passive exposure: Never    Smokeless tobacco: Never   Vaping Use    Vaping status: Never Used   Substance and Sexual Activity    Alcohol use: No    Drug use: No    Sexual activity: Defer       Family History   Problem Relation Age of Onset    Hypertension Father     Colon cancer Father     Breast cancer Sister     Heart attack Brother     Heart attack Brother        Prior to Admission Medications:  Medications Prior to Admission   Medication Sig Dispense Refill Last Dose    albuterol sulfate  (90 Base) MCG/ACT inhaler Inhale 2 puffs Every 6 (Six) Hours As Needed.   2024    allopurinol (ZYLOPRIM) 100 MG tablet Take 1 tablet by mouth As Needed.  5 2024    amitriptyline (ELAVIL) 50 MG tablet Take 1 tablet by mouth At Night As Needed. 2 tabs at night   2024    carvedilol (COREG) 12.5 MG tablet Take 1 tablet by mouth 2 (Two) Times a Day.  5 2024    cloNIDine (CATAPRES) 0.1 MG tablet Take 1 tablet by mouth.   2024    diclofenac (VOLTAREN) 1 % gel gel  Apply 4 g topically As Needed.  5 9/25/2024    finasteride (PROSCAR) 5 MG tablet Take 1 tablet by mouth Daily.   9/25/2024    gabapentin (NEURONTIN) 800 MG tablet TAKE 1 TABLET 3 TIMES A DAY BY ORAL ROUTE FOR 30 DAYS.   9/25/2024    glipiZIDE (GLUCOTROL) 10 MG tablet Take 2 tablets by mouth 2 (Two) Times a Day.  5 9/25/2024    hydrALAZINE (APRESOLINE) 100 MG tablet Take 1 tablet by mouth Every 8 (Eight) Hours.   9/25/2024    LANTUS 100 UNIT/ML injection Inject 40-75 Units under the skin into the appropriate area as directed Daily As Needed.  5 9/25/2024    levothyroxine (SYNTHROID, LEVOTHROID) 75 MCG tablet Take 1 tablet by mouth Daily.  5 9/25/2024    lidocaine (LIDODERM) 5 %    Past Month    lisinopril (PRINIVIL,ZESTRIL) 40 MG tablet Take 1 tablet by mouth Daily.  5 9/25/2024    metOLazone (ZAROXOLYN) 2.5 MG tablet Take 1 tablet by mouth Every Other Day.   9/25/2024    omeprazole (priLOSEC) 40 MG capsule Take 1 capsule by mouth Daily.  5 9/25/2024    oxyCODONE-acetaminophen (PERCOCET)  MG per tablet Take  by mouth Every 8 (Eight) Hours As Needed.   9/25/2024    pravastatin (PRAVACHOL) 80 MG tablet Take 1 tablet by mouth Daily.   9/25/2024    sertraline (ZOLOFT) 50 MG tablet    9/25/2024    tamsulosin (FLOMAX) 0.4 MG capsule 24 hr capsule Take 1 capsule by mouth Daily.   9/25/2024    tiZANidine (ZANAFLEX) 4 MG tablet Take  by mouth Daily.   9/25/2024    TRADJENTA 5 MG tablet tablet Take 1 tablet by mouth Daily.  5 9/25/2024    traZODone (DESYREL) 100 MG tablet TAKE ONE TABLET BY MOUTH NIGHTLY for sleep   9/25/2024    meclizine (ANTIVERT) 25 MG tablet Take 1 tablet by mouth.   More than a month    Ozempic, 1 MG/DOSE, 4 MG/3ML solution pen-injector Inject 1 mg under the skin into the appropriate area as directed. (Patient not taking: Reported on 9/25/2024)   Not Taking       Allergies:  Allergies   Allergen Reactions    Atenolol Other (See Comments)     dizzy    Meloxicam Dizziness     Dizzy, unable to focus         Vitals: Temp:  [97.2 °F (36.2 °C)] 97.2 °F (36.2 °C)  Heart Rate:  [65] 65  Resp:  [17] 17  BP: (156)/(87) 156/87    Review of Systems:   Within Normal Limits Abnormal   HEENT [x]    []     Cardiovascular [x]   []     Gastrointestinal [x]   []     Genitourinary [x]   []     Neurologic [x]   []     Pulmonary [x]   []       Physical Exam:   Within Normal Limits Abnormal   HEENT [x]    []     Heart [x]   []     Lungs [x]   []     Abdomen [x]   []     Extremities [x]   []       Impression: Right nuclear sclerotic cataract.     Plan: CATARACT PHACO EXTRACTION WITH INTRAOCULAR LENS IMPLANT RIGHT (Right)     Justin Gao MD  9/26/2024

## 2024-09-30 ENCOUNTER — PREP FOR SURGERY (OUTPATIENT)
Dept: OTHER | Facility: HOSPITAL | Age: 69
End: 2024-09-30
Payer: MEDICARE

## 2024-09-30 DIAGNOSIS — H25.12 NUCLEAR SCLEROTIC CATARACT OF LEFT EYE: Primary | ICD-10-CM

## 2024-09-30 RX ORDER — TETRACAINE HYDROCHLORIDE 5 MG/ML
1 SOLUTION OPHTHALMIC SEE ADMIN INSTRUCTIONS
Status: CANCELLED | OUTPATIENT
Start: 2024-09-30

## 2024-09-30 RX ORDER — SODIUM CHLORIDE 0.9 % (FLUSH) 0.9 %
1-10 SYRINGE (ML) INJECTION AS NEEDED
Status: CANCELLED | OUTPATIENT
Start: 2024-09-30

## 2024-09-30 RX ORDER — SODIUM CHLORIDE 9 MG/ML
40 INJECTION, SOLUTION INTRAVENOUS AS NEEDED
Status: CANCELLED | OUTPATIENT
Start: 2024-09-30

## 2024-09-30 RX ORDER — SODIUM CHLORIDE 0.9 % (FLUSH) 0.9 %
10 SYRINGE (ML) INJECTION EVERY 12 HOURS SCHEDULED
Status: CANCELLED | OUTPATIENT
Start: 2024-09-30

## 2024-09-30 RX ORDER — CYCLOPENT/TROPIC/PHEN/KETR/WAT 1%-1%-2.5%
1 DROPS (EA) OPHTHALMIC (EYE)
Status: CANCELLED | OUTPATIENT
Start: 2024-09-30 | End: 2024-09-30

## 2024-09-30 RX ORDER — PREDNISOLONE ACETATE 10 MG/ML
1 SUSPENSION/ DROPS OPHTHALMIC SEE ADMIN INSTRUCTIONS
Status: CANCELLED | OUTPATIENT
Start: 2024-09-30

## 2024-10-09 NOTE — PRE-PROCEDURE INSTRUCTIONS
PAT phone history completed with patient for upcoming procedure on 10/10/24 with Dr. Gao.    PAT PASS reviewed with patient and they verbalize understanding of the following:     Do not eat or drink anything after midnight the night before procedure unless otherwise instructed by physician/surgeon's office, this includes no gum, candy, mints, tobacco products or e-cigarettes.  Do not shave the area to be operated on at least 48 hours prior to procedure.  Do not wear makeup, lotion, hair products, or nail polish.  Do not wear any jewelry and remove all piercings.  Do not wear any adhesive if you wear dentures.  Do not wear contacts; bring in glasses if needed.  Only take medications on the morning of procedure as instructed by PAT nurse per anesthesia guidelines or as instructed by physician's office.  If you are on any blood thinners reach out to the physician/surgeon's office for instructions on when/if they will need to be stopped prior to procedure.  Bring in picture ID and insurance card, advanced directive copies if applicable, CPAP/BIPAP/Inhalers if indicated morning of procedure, leave any other valuables at home.  Ensure you have arranged for someone to drive you home the day of your procedure and someone to care for you at home afterwards. It is recommended that you do not drive, drink alcohol, or make any major legal decisions for at least 24 hours after your procedure is complete.    Instructions given on hospital entrance and registration location.

## 2024-10-10 ENCOUNTER — ANESTHESIA (OUTPATIENT)
Dept: PERIOP | Facility: HOSPITAL | Age: 69
End: 2024-10-10
Payer: MEDICARE

## 2024-10-10 ENCOUNTER — ANESTHESIA EVENT (OUTPATIENT)
Dept: PERIOP | Facility: HOSPITAL | Age: 69
End: 2024-10-10
Payer: MEDICARE

## 2024-10-10 ENCOUNTER — HOSPITAL ENCOUNTER (OUTPATIENT)
Facility: HOSPITAL | Age: 69
Setting detail: HOSPITAL OUTPATIENT SURGERY
Discharge: HOME | End: 2024-10-10
Attending: OPHTHALMOLOGY | Admitting: OPHTHALMOLOGY
Payer: MEDICARE

## 2024-10-10 VITALS
HEART RATE: 65 BPM | DIASTOLIC BLOOD PRESSURE: 74 MMHG | RESPIRATION RATE: 18 BRPM | OXYGEN SATURATION: 95 % | TEMPERATURE: 98.4 F | SYSTOLIC BLOOD PRESSURE: 159 MMHG

## 2024-10-10 DIAGNOSIS — H25.12 NUCLEAR SCLEROTIC CATARACT OF LEFT EYE: ICD-10-CM

## 2024-10-10 LAB — GLUCOSE BLDC GLUCOMTR-MCNC: 208 MG/DL (ref 70–130)

## 2024-10-10 PROCEDURE — 82948 REAGENT STRIP/BLOOD GLUCOSE: CPT | Performed by: OPHTHALMOLOGY

## 2024-10-10 PROCEDURE — 25010000002 MIDAZOLAM PER 1MG: Performed by: NURSE ANESTHETIST, CERTIFIED REGISTERED

## 2024-10-10 PROCEDURE — 25010000002 LIDOCAINE HCL (PF) 4 % SOLUTION: Performed by: OPHTHALMOLOGY

## 2024-10-10 PROCEDURE — V2632 POST CHMBR INTRAOCULAR LENS: HCPCS | Performed by: OPHTHALMOLOGY

## 2024-10-10 PROCEDURE — 25010000002 FENTANYL CITRATE (PF) 50 MCG/ML SOLUTION PREFILLED SYRINGE: Performed by: NURSE ANESTHETIST, CERTIFIED REGISTERED

## 2024-10-10 DEVICE — LENS MONOFOCAL IQ CC60WF185: Type: IMPLANTABLE DEVICE | Site: POSTERIOR CHAMBER | Status: FUNCTIONAL

## 2024-10-10 RX ORDER — SODIUM CHLORIDE 0.9 % (FLUSH) 0.9 %
10 SYRINGE (ML) INJECTION EVERY 12 HOURS SCHEDULED
Status: DISCONTINUED | OUTPATIENT
Start: 2024-10-10 | End: 2024-10-10 | Stop reason: HOSPADM

## 2024-10-10 RX ORDER — SODIUM CHLORIDE 0.9 % (FLUSH) 0.9 %
1-10 SYRINGE (ML) INJECTION AS NEEDED
Status: DISCONTINUED | OUTPATIENT
Start: 2024-10-10 | End: 2024-10-10 | Stop reason: HOSPADM

## 2024-10-10 RX ORDER — SODIUM CHLORIDE 9 MG/ML
40 INJECTION, SOLUTION INTRAVENOUS AS NEEDED
Status: DISCONTINUED | OUTPATIENT
Start: 2024-10-10 | End: 2024-10-10 | Stop reason: HOSPADM

## 2024-10-10 RX ORDER — BALANCED SALT SOLUTION 6.4; .75; .48; .3; 3.9; 1.7 MG/ML; MG/ML; MG/ML; MG/ML; MG/ML; MG/ML
SOLUTION OPHTHALMIC AS NEEDED
Status: DISCONTINUED | OUTPATIENT
Start: 2024-10-10 | End: 2024-10-10 | Stop reason: HOSPADM

## 2024-10-10 RX ORDER — FENTANYL CITRATE 50 UG/ML
INJECTION, SOLUTION INTRAMUSCULAR; INTRAVENOUS AS NEEDED
Status: DISCONTINUED | OUTPATIENT
Start: 2024-10-10 | End: 2024-10-10 | Stop reason: SURG

## 2024-10-10 RX ORDER — MIDAZOLAM HYDROCHLORIDE 2 MG/2ML
INJECTION, SOLUTION INTRAMUSCULAR; INTRAVENOUS AS NEEDED
Status: DISCONTINUED | OUTPATIENT
Start: 2024-10-10 | End: 2024-10-10 | Stop reason: SURG

## 2024-10-10 RX ORDER — POVIDONE-IODINE 5 %
SOLUTION, NON-ORAL OPHTHALMIC (EYE) AS NEEDED
Status: DISCONTINUED | OUTPATIENT
Start: 2024-10-10 | End: 2024-10-10 | Stop reason: HOSPADM

## 2024-10-10 RX ORDER — NEOMYCIN SULFATE, POLYMYXIN B SULFATE, AND DEXAMETHASONE 3.5; 10000; 1 MG/G; [USP'U]/G; MG/G
OINTMENT OPHTHALMIC AS NEEDED
Status: DISCONTINUED | OUTPATIENT
Start: 2024-10-10 | End: 2024-10-10 | Stop reason: HOSPADM

## 2024-10-10 RX ORDER — PREDNISOLONE ACETATE 10 MG/ML
1 SUSPENSION/ DROPS OPHTHALMIC SEE ADMIN INSTRUCTIONS
Status: DISCONTINUED | OUTPATIENT
Start: 2024-10-10 | End: 2024-10-10 | Stop reason: HOSPADM

## 2024-10-10 RX ORDER — LIDOCAINE HYDROCHLORIDE 40 MG/ML
INJECTION, SOLUTION RETROBULBAR AS NEEDED
Status: DISCONTINUED | OUTPATIENT
Start: 2024-10-10 | End: 2024-10-10 | Stop reason: HOSPADM

## 2024-10-10 RX ORDER — CYCLOPENT/TROPIC/PHEN/KETR/WAT 1%-1%-2.5%
1 DROPS (EA) OPHTHALMIC (EYE)
Status: COMPLETED | OUTPATIENT
Start: 2024-10-10 | End: 2024-10-10

## 2024-10-10 RX ORDER — ACETAZOLAMIDE 500 MG/1
500 CAPSULE, EXTENDED RELEASE ORAL ONCE
Status: COMPLETED | OUTPATIENT
Start: 2024-10-10 | End: 2024-10-10

## 2024-10-10 RX ORDER — TETRACAINE HYDROCHLORIDE 5 MG/ML
1 SOLUTION OPHTHALMIC SEE ADMIN INSTRUCTIONS
Status: COMPLETED | OUTPATIENT
Start: 2024-10-10 | End: 2024-10-10

## 2024-10-10 RX ORDER — PREDNISOLONE ACETATE 10 MG/ML
SUSPENSION/ DROPS OPHTHALMIC AS NEEDED
Status: DISCONTINUED | OUTPATIENT
Start: 2024-10-10 | End: 2024-10-10 | Stop reason: HOSPADM

## 2024-10-10 RX ORDER — TETRACAINE HYDROCHLORIDE 5 MG/ML
SOLUTION OPHTHALMIC AS NEEDED
Status: DISCONTINUED | OUTPATIENT
Start: 2024-10-10 | End: 2024-10-10 | Stop reason: HOSPADM

## 2024-10-10 RX ADMIN — ACETAZOLAMIDE 500 MG: 500 CAPSULE ORAL at 10:49

## 2024-10-10 RX ADMIN — Medication 1 DROP: at 09:28

## 2024-10-10 RX ADMIN — TETRACAINE HYDROCHLORIDE 1 DROP: 5 SOLUTION OPHTHALMIC at 09:21

## 2024-10-10 RX ADMIN — TETRACAINE HYDROCHLORIDE 1 DROP: 5 SOLUTION OPHTHALMIC at 09:22

## 2024-10-10 RX ADMIN — FENTANYL CITRATE 50 MCG: 50 INJECTION, SOLUTION INTRAMUSCULAR; INTRAVENOUS at 10:26

## 2024-10-10 RX ADMIN — Medication 1 DROP: at 09:33

## 2024-10-10 RX ADMIN — MIDAZOLAM HYDROCHLORIDE 2 MG: 1 INJECTION, SOLUTION INTRAMUSCULAR; INTRAVENOUS at 10:26

## 2024-10-10 RX ADMIN — Medication 10 ML: at 10:26

## 2024-10-10 RX ADMIN — Medication 1 DROP: at 09:23

## 2024-10-10 NOTE — DISCHARGE INSTRUCTIONS
[facility], located within [facility], [facility]  [address] [place] [address]  (P): [phone]           (F): [phone]    Thomas Briceño  PATIENT NAME:__________________________________    Left Eye    POST OPERATIVE INSTRUCTIONS    You have received anesthesia today. DO NOT drive, drink alcohol, sign legal documents.   After surgery, your eye will not hurt. It may feel scratchy, sticky or uncomfortable. Your eye will be sensitive to light.  Most people see better 1-3 days after the procedure, but it could take 3 weeks to get the full benefits and reach your visual potential. If your vision is blurry for a few days it is normal, and means you may have swelling outside or inside the eye. For some patients, a bubble is placed and there will be blurriness.   You should receive a post-op kit with tape and an eye shield. Wear the shield for the first 3 nights after surgery to keep you from rubbing the eye.  Most people are able to return to their normal routine 1-3 days after surgery, however, due to the brain adjusting to your new vision you may have trouble judging distances and want to be careful when driving and going up and downstairs.   You can shower and wash your hair the day after surgery. Keep water, shampoo, hair spray and shaving lotion out of the eye, especially for the first week.  During the first week, you should AVOID:   Rubbing or putting pressure on your eye.  Eye make-up, face cream or lotion, hair coloring or perms  Strenuous activities, such as running or lifting weights, as to avoid sweat from getting in the eye. Avoid swimming, hot tubs, fumes or usman conditions.   Keep your head above your heart (no hanging the head down for periods of time).  Some discomfort and blurred vision after surgery are normal, but if you have any unusual pain, swelling, bleeding or sudden decrease in vision, contact our office immediately. Emergency assistance is available at any time by calling:    Dr. Justin FORREST  [place]    [phone]    If unable to reach call [facility] @ [phone]              POST OPERATIVE DROP INSTRUCTIONS  You have been prescribed eye drops to use after surgery, please follow these instructions:  PLACE A EVELIO IN THE DAY COLUMN EACH TIME YOU USE TO KEEP ON SCHEDULE. WAIT 5 MINUTES IN BETWEEN DROPS    Prednisolone (PINK TOP) SHAKE WELL BEFORE USE   GIVEN TO YOU BY THE HOSPITAL    WEEK 1-USE 4  (FOUR) TIMES A DAY DAY 1   DAY 2 DAY 3 DAY 4 DAY 5 DAY 6 DAY 7     WEEK 2-USE 3 (THREE)  TIMES A DAY DAY 1 DAY 2 DAY 3 DAY 4 DAY 5 DAY 6 DAY 7     WEEK 3-USE 2 (TWO) TIMES A DAY DAY 1 DAY 2 DAY 3 DAY 4 DAY 5 DAY 6 DAY 7     WEEK 4-USE 1 (ONE)TIME A DAY DAY 1 DAY 2 DAY 3 DAY 4 DAY 5 DAY 6 DAY 7         Ketorolac (GRAY TOP) PRESCRIBED TO YOUR PHARMACY    WEEK 1-USE 4  (FOUR) TIMES A DAY DAY 1   DAY 2 DAY 3 DAY 4 DAY 5 DAY 6 DAY 7     WEEK 2-USE 3 (THREE)  TIMES A DAY DAY 1 DAY 2 DAY 3 DAY 4 DAY 5 DAY 6 DAY 7     WEEK 3-USE 2 (TWO) TIMES A DAY DAY 1 DAY 2 DAY 3 DAY 4 DAY 5 DAY 6 DAY 7     WEEK 4-USE 1 (ONE)TIME A DAY DAY 1 DAY 2 DAY 3 DAY 4 DAY 5 DAY 6 DAY 7       Moxifloxacin (TAN TOP) PRESCRIBED TO YOUR PHARMACY    WEEK 1-USE 4  (FOUR) TIMES A DAY DAY 1   DAY 2 DAY 3 DAY 4 DAY 5 DAY 6 DAY 7       No pushing, pulling, tugging,  heavy lifting, or strenuous activity.  No major decision making, driving, or drinking alcoholic beverages for 24 hours. ( due to the medications you have  received)  Always use good hand hygiene/washing techniques.  NO driving while taking pain medications.    * if you have an incision:  Check your incision area every day for signs of infection.   Check for:  * more redness, swelling, or pain  *more fluid or blood  *warmth  *pus or bad smell    To assist you in voiding:  Drink plenty of fluids  Listen to running water while attempting to void.    If you are unable to urinate and you have an uncomfortable urge to void or it has  been   6 hours since you were discharged, return to the Emergency Room

## 2024-10-10 NOTE — OP NOTE
OPERATIVE NOTE    Date of Procedure: 10/10/2024  Patient Name: Thomas Briceño  Patient MRN: [id number]  YOB: 1955     Preoperative Diagnosis: Left nuclear sclerotic cataract.     Postoperative Diagnosis: Left nuclear sclerotic cataract.     Procedure Performed: Phacoemulsification with implantation of a foldable posterior chamber intraocular lens, Left eye.     Surgeon: Justin Gao MD    Anesthesia:  Monitored Anesthesia Care (MAC)      Brief History and Indication: The patient presents with a history of past progressive loss of vision.  Patient was diagnosed with cataract and requests removal for increased ability to read and see.     Operation Description: The patient was taken to the OR and prepped and draped in the usual sterile ophthalmic fashion. A lid speculum was placed in the eye.  A #75 blade was then used to make a stab incision two o’clock hours from the intended temporal clear cornea groove. The anterior chamber was then inflated with a Viscoelastic. A metal microkeratome blade was then used to enter the anterior chamber at the temporal clear cornea site. A three level tunnel incision was made. A curvilinear capsulorrhexis was then performed with a bent cystotome needle and capsulorrhexis forceps.  BSS on a 30 gauge bent cannula was used to hydro-dissect, and hydro-delineate the lens. Good fluid waves and hydro-delineation were noted. Phacoemulsification was then used to remove nuclear material without complications. The residual cortical and lenticular material was then removed with irrigation and aspiration. Viscoelastics were then used to inflate the bag in a soft shell technique. A PCIOL was injected into the bag. Post-implantation, there were no rents or tears in the bag and the lens was noted to be stable and centered. The residual Viscoelastic was then removed with irrigation and aspiration.  The wound was checked and found to be without leaks. Therefore a Polydex ointment and one  drop of Durezol eye drop was placed in the eye.     Implant Information:   Implant Name Type Inv. Item Serial No.  Lot No. LRB No. Used Action   LENS MONOFOCAL IQ [id number] - [id number] - [id number] Implant LENS MONOFOCAL IQ [id number] [id number]  Left 1 Implanted       Complications: None    Estimated Blood Loss:  Less than 1 cc.       []   Changed to complex procedure due to: []  hypermature, white cataract. Blue dye was used. []   small iris. A Malyugin Ring was used.    Discharge and Condition  The patient was transported to same day surgery in excellent condition and scheduled for follow-up tomorrow morning. The patient was given instructions on use of eye drops for the operative eye and was specifically instructed to call Dr. Gao at his office or home for any nausea, vomiting, headache, decreased visual acuity, or pain, or if the patient had any general concerns.    Justin Gao MD  10/10/2024

## 2024-10-10 NOTE — ANESTHESIA PREPROCEDURE EVALUATION
Anesthesia Evaluation     Patient summary reviewed and Nursing notes reviewed   NPO Solid Status: > 8 hours  NPO Liquid Status: > 8 hours           Airway   Mallampati: II  TM distance: >3 FB  Neck ROM: full  Possible difficult intubation and Large neck circumference  Dental - normal exam     Pulmonary    (+) ,shortness of breath, sleep apnea  (-) not a smoker  Cardiovascular - normal exam  Exercise tolerance: good (4-7 METS)    Patient on routine beta blocker    (+) hypertension, hyperlipidemia      Neuro/Psych  (+) psychiatric history Depression  GI/Hepatic/Renal/Endo    (+) obesity, morbid obesity, GERD, renal disease- CRI, diabetes mellitus, thyroid problem hypothyroidism    Musculoskeletal     Abdominal   (+) obese   Substance History - negative use     OB/GYN negative ob/gyn ROS         Other   arthritis,     ROS/Med Hx Other: Family hx of malignant hyperthermia                Anesthesia Plan    ASA 3     MAC     (Risks and benefits discussed including risk of aspiration, recall and dental damage. All patient questions answered.    Will continue with plan of care.)  intravenous induction     Anesthetic plan, risks, benefits, and alternatives have been provided, discussed and informed consent has been obtained with: patient.  Pre-procedure education provided  Plan discussed with CRNA.      CODE STATUS:

## 2024-10-10 NOTE — ANESTHESIA POSTPROCEDURE EVALUATION
Patient: Thomas Briceño    Procedure Summary       Date: 10/10/24 Room / Location: [facility] OR 3 /  [facility]    Anesthesia Start: 1022 Anesthesia Stop: 1034    Procedure: CATARACT PHACO EXTRACTION WITH INTRAOCULAR LENS IMPLANT LEFT (Left: Eye) Diagnosis:       Nuclear sclerotic cataract of left eye      (Nuclear sclerotic cataract of left eye [H25.12])    Surgeons: Justin Gao MD Provider: Maurisio Lopez CRNA    Anesthesia Type: MAC ASA Status: 3            Anesthesia Type: MAC    Vitals  No vitals data found for the desired time range.          Post Anesthesia Care and Evaluation    Patient location during evaluation: PHASE II  Patient participation: complete - patient participated  Level of consciousness: awake and alert  Pain score: 1  Pain management: satisfactory to patient    Airway patency: patent  Anesthetic complications: No anesthetic complications  PONV Status: none  Cardiovascular status: acceptable and stable  Respiratory status: acceptable and spontaneous ventilation  Hydration status: acceptable    Comments: Vitals signs as noted in nursing documentation as per protocol.

## 2024-10-10 NOTE — H&P
[facility] [place]         History and Physical    Patient Name: Thomas Briceño  MRN: [id number]  : 1955  Gender: male     HPI: Patient complaint of PPLOV Left eye diagnosed with cataract. Patient requests PHACO PCIOL for Increase of VA/ADL.    History:    Past Medical History:   Diagnosis Date    Acid reflux     Anxiety     Arthritis     Chronic kidney disease, stage 3 (moderate)     Depression     Diabetes     Exertional shortness of breath 2018    Family history of malignant hyperthermia     pt states his grandaughter had it - when asked if her body temperature was critically elevated, pt answered yes, 24    Gout     Hyperlipidemia     Hypertension     Hypothyroidism     Sleep apnea     uses CPAP sometimes       Past Surgical History:   Procedure Laterality Date    CATARACT EXTRACTION W/ INTRAOCULAR LENS IMPLANT Right 2024    Procedure: CATARACT PHACO EXTRACTION WITH INTRAOCULAR LENS IMPLANT RIGHT;  Surgeon: Justin Gao MD;  Location: [facility];  Service: Ophthalmology;  Laterality: Right;    NO PAST SURGERIES         Social History     Socioeconomic History    Marital status:    Tobacco Use    Smoking status: Never     Passive exposure: Never    Smokeless tobacco: Never   Vaping Use    Vaping status: Never Used   Substance and Sexual Activity    Alcohol use: No    Drug use: No    Sexual activity: Defer       Family History   Problem Relation Age of Onset    Hypertension Father     Colon cancer Father     Breast cancer Sister     Heart attack Brother     Heart attack Brother        Prior to Admission Medications:  Medications Prior to Admission   Medication Sig Dispense Refill Last Dose    albuterol sulfate  (90 Base) MCG/ACT inhaler Inhale 2 puffs Every 6 (Six) Hours As Needed.   Past Month    allopurinol (ZYLOPRIM) 100 MG tablet Take 1 tablet by mouth As Needed.  5 10/9/2024    amitriptyline (ELAVIL) 50 MG tablet Take 1 tablet by mouth At Night  As Needed. 2 tabs at night   10/9/2024    carvedilol (COREG) 12.5 MG tablet Take 1 tablet by mouth 2 (Two) Times a Day.  5 10/9/2024    cloNIDine (CATAPRES) 0.1 MG tablet Take 1 tablet by mouth.   10/9/2024    finasteride (PROSCAR) 5 MG tablet Take 1 tablet by mouth Daily.   10/9/2024    gabapentin (NEURONTIN) 800 MG tablet TAKE 1 TABLET 3 TIMES A DAY BY ORAL ROUTE FOR 30 DAYS.   10/9/2024    glipiZIDE (GLUCOTROL) 10 MG tablet Take 2 tablets by mouth 2 (Two) Times a Day.  5 10/9/2024    hydrALAZINE (APRESOLINE) 100 MG tablet Take 1 tablet by mouth Every 8 (Eight) Hours.   10/9/2024    LANTUS 100 UNIT/ML injection Inject 40-75 Units under the skin into the appropriate area as directed Daily As Needed.  5 10/9/2024    levothyroxine (SYNTHROID, LEVOTHROID) 75 MCG tablet Take 1 tablet by mouth Daily.  5 10/9/2024    lidocaine (LIDODERM) 5 %    10/9/2024    lisinopril (PRINIVIL,ZESTRIL) 40 MG tablet Take 1 tablet by mouth Daily.  5 10/9/2024    meclizine (ANTIVERT) 25 MG tablet Take 1 tablet by mouth.   10/9/2024    metOLazone (ZAROXOLYN) 2.5 MG tablet Take 1 tablet by mouth Every Other Day.   10/9/2024    omeprazole (priLOSEC) 40 MG capsule Take 1 capsule by mouth Daily.  5 10/9/2024    pravastatin (PRAVACHOL) 80 MG tablet Take 1 tablet by mouth Daily.   10/9/2024    sertraline (ZOLOFT) 50 MG tablet    10/9/2024    tamsulosin (FLOMAX) 0.4 MG capsule 24 hr capsule Take 1 capsule by mouth Daily.   10/9/2024    tiZANidine (ZANAFLEX) 4 MG tablet Take  by mouth Daily.   Past Month    TRADJENTA 5 MG tablet tablet Take 1 tablet by mouth Daily.  5 10/9/2024    traZODone (DESYREL) 100 MG tablet TAKE ONE TABLET BY MOUTH NIGHTLY for sleep   10/9/2024    diclofenac (VOLTAREN) 1 % gel gel Apply 4 g topically As Needed.  5 Unknown    oxyCODONE-acetaminophen (PERCOCET)  MG per tablet Take  by mouth Every 8 (Eight) Hours As Needed.   Unknown    Ozempic, 1 MG/DOSE, 4 MG/3ML solution pen-injector Inject 1 mg under the skin into the  appropriate area as directed.   9/2/2024       Allergies:  Allergies   Allergen Reactions    Atenolol Other (See Comments)     dizzy    Meloxicam Dizziness     Dizzy, unable to focus        Vitals: Temp:  [97.2 °F (36.2 °C)] 97.2 °F (36.2 °C)  Heart Rate:  [56] 56  Resp:  [18] 18  BP: (184)/(89) 184/89    Review of Systems:   Within Normal Limits Abnormal   HEENT [x]    []     Cardiovascular [x]   []     Gastrointestinal [x]   []     Genitourinary [x]   []     Neurologic [x]   []     Pulmonary [x]   []       Physical Exam:   Within Normal Limits Abnormal   HEENT [x]    []     Heart [x]   []     Lungs [x]   []     Abdomen [x]   []     Extremities [x]   []       Impression: Left nuclear sclerotic cataract.     Plan: CATARACT PHACO EXTRACTION WITH INTRAOCULAR LENS IMPLANT LEFT (Left)     Justin Gao MD  10/10/2024

## 2024-10-24 ENCOUNTER — OFFICE VISIT (OUTPATIENT)
Dept: ORTHOPEDIC SURGERY | Facility: CLINIC | Age: 69
End: 2024-10-24
Payer: MEDICARE

## 2024-10-24 VITALS
WEIGHT: 315 LBS | HEIGHT: 72 IN | BODY MASS INDEX: 42.66 KG/M2 | DIASTOLIC BLOOD PRESSURE: 86 MMHG | SYSTOLIC BLOOD PRESSURE: 146 MMHG

## 2024-10-24 DIAGNOSIS — M67.912 TENDINOPATHY OF LEFT ROTATOR CUFF: ICD-10-CM

## 2024-10-24 DIAGNOSIS — M75.42 IMPINGEMENT SYNDROME OF LEFT SHOULDER: Primary | ICD-10-CM

## 2024-10-24 RX ORDER — LIDOCAINE HYDROCHLORIDE 20 MG/ML
2 INJECTION, SOLUTION INFILTRATION; PERINEURAL
Status: COMPLETED | OUTPATIENT
Start: 2024-10-24 | End: 2024-10-24

## 2024-10-24 RX ORDER — METHYLPREDNISOLONE ACETATE 40 MG/ML
40 INJECTION, SUSPENSION INTRA-ARTICULAR; INTRALESIONAL; INTRAMUSCULAR; SOFT TISSUE
Status: COMPLETED | OUTPATIENT
Start: 2024-10-24 | End: 2024-10-24

## 2024-10-24 RX ADMIN — LIDOCAINE HYDROCHLORIDE 2 ML: 20 INJECTION, SOLUTION INFILTRATION; PERINEURAL at 08:52

## 2024-10-24 RX ADMIN — METHYLPREDNISOLONE ACETATE 40 MG: 40 INJECTION, SUSPENSION INTRA-ARTICULAR; INTRALESIONAL; INTRAMUSCULAR; SOFT TISSUE at 08:52

## 2024-10-24 NOTE — PROGRESS NOTES
"    Office Note     Name: Thomas Briceño    : 1955     MRN: [id number]     Chief Complaint  Follow-up and Injections of the Left Shoulder    Subjective     History of Present Illness:  Thomas Briceño is a 69 y.o. male here today for injection therapy.  Patient states pain seems to be improving.  He notes that the injection helps a lot with the pain though the injection tends to wear off approximately 2 weeks before his follow-ups with me.    Review of Systems     Past Medical History:   Diagnosis Date    Acid reflux     Anxiety     Arthritis     Chronic kidney disease, stage 3 (moderate)     Depression     Diabetes     Exertional shortness of breath 2018    Family history of malignant hyperthermia     pt states his grandaughter had it - when asked if her body temperature was critically elevated, pt answered yes, 24    Gout     Hyperlipidemia     Hypertension     Hypothyroidism     Sleep apnea     uses CPAP sometimes         Past Surgical History:   Procedure Laterality Date    CATARACT EXTRACTION W/ INTRAOCULAR LENS IMPLANT Right 2024    Procedure: CATARACT PHACO EXTRACTION WITH INTRAOCULAR LENS IMPLANT RIGHT;  Surgeon: Justin Gao MD;  Location: [facility];  Service: Ophthalmology;  Laterality: Right;    CATARACT EXTRACTION W/ INTRAOCULAR LENS IMPLANT Left 10/10/2024    Procedure: CATARACT PHACO EXTRACTION WITH INTRAOCULAR LENS IMPLANT LEFT;  Surgeon: Justin Gao MD;  Location: [facility] OR;  Service: Ophthalmology;  Laterality: Left;    NO PAST SURGERIES         Allergies   Allergen Reactions    Atenolol Other (See Comments)     dizzy    Meloxicam Dizziness     Dizzy, unable to focus         Objective   /86 (BP Location: Left arm, Patient Position: Sitting)   Ht 182.9 cm (72.01\")   Wt (!) 147 kg (324 lb 6.4 oz)   BMI 43.98 kg/m²    Physical Exam    Skin exam stable with no erythema, ecchymosis or rash.  No new swelling.  No motor or sensory changes.  Distal pulse " intact.    Large Joint Arthrocentesis: L subacromial bursa  Date/Time: 10/24/2024 8:52 AM  Consent given by: patient  Site marked: site marked  Timeout: Immediately prior to procedure a time out was called to verify the correct patient, procedure, equipment, support staff and site/side marked as required   Supporting Documentation  Indications: pain   Procedure Details  Location: shoulder - L subacromial bursa  Preparation: Patient was prepped and draped in the usual sterile fashion  Needle size: 22 G  Approach: posterior  Medications administered: 40 mg methylPREDNISolone acetate 40 MG/ML; 2 mL lidocaine 2%  Patient tolerance: patient tolerated the procedure well with no immediate complications        Assessment and Plan      Diagnosis Plan   1. Impingement syndrome of left shoulder  Large Joint Arthrocentesis: L subacromial bursa      2. Tendinopathy of left rotator cuff  Large Joint Arthrocentesis: L subacromial bursa          Discussion of orthopaedic goals and activities and patient expressed appreciation.  Regular exercise as tolerated  Ice, heat, and/or modalities as beneficial  Watch for signs and symptoms of infection  Call or notify for any adverse effect from injection therapy    Recommendations:  Usual activities, routine exercise as tolerated, light physical work as tolerated, no strenuous activity.    Today is the patient's third injection into the left subacromial space.  Do not recommend further injections into the space.  If pain persist at next follow-up consider left shoulder arthroscopy.    Return in about 3 months (around 1/24/2025) for Recheck.  Patient agreeable to call or return sooner for any concerns.

## 2024-10-25 RX ORDER — MECLIZINE HYDROCHLORIDE 25 MG/1
TABLET ORAL
Qty: 270 TABLET | Refills: 0 | Status: SHIPPED | OUTPATIENT
Start: 2024-10-25

## 2024-11-11 DIAGNOSIS — K21.9 GASTROESOPHAGEAL REFLUX DISEASE, UNSPECIFIED WHETHER ESOPHAGITIS PRESENT: Primary | Chronic | ICD-10-CM

## 2024-11-12 RX ORDER — OMEPRAZOLE 40 MG/1
CAPSULE, DELAYED RELEASE ORAL
Qty: 90 CAPSULE | Refills: 1 | Status: SHIPPED | OUTPATIENT
Start: 2024-11-12

## 2024-11-20 ENCOUNTER — OFFICE VISIT (OUTPATIENT)
Age: 69
End: 2024-11-20

## 2024-11-20 VITALS
HEART RATE: 77 BPM | SYSTOLIC BLOOD PRESSURE: 150 MMHG | HEIGHT: 72 IN | OXYGEN SATURATION: 94 % | RESPIRATION RATE: 18 BRPM | BODY MASS INDEX: 42.66 KG/M2 | DIASTOLIC BLOOD PRESSURE: 80 MMHG | WEIGHT: 315 LBS

## 2024-11-20 DIAGNOSIS — N18.31 TYPE 2 DIABETES MELLITUS WITH STAGE 3A CHRONIC KIDNEY DISEASE, WITH LONG-TERM CURRENT USE OF INSULIN (HCC): ICD-10-CM

## 2024-11-20 DIAGNOSIS — Z12.11 COLON CANCER SCREENING: ICD-10-CM

## 2024-11-20 DIAGNOSIS — E11.22 TYPE 2 DIABETES MELLITUS WITH STAGE 3A CHRONIC KIDNEY DISEASE, WITH LONG-TERM CURRENT USE OF INSULIN (HCC): ICD-10-CM

## 2024-11-20 DIAGNOSIS — Z00.00 MEDICARE ANNUAL WELLNESS VISIT, SUBSEQUENT: Primary | ICD-10-CM

## 2024-11-20 DIAGNOSIS — I10 PRIMARY HYPERTENSION: ICD-10-CM

## 2024-11-20 DIAGNOSIS — Z79.4 TYPE 2 DIABETES MELLITUS WITH STAGE 3A CHRONIC KIDNEY DISEASE, WITH LONG-TERM CURRENT USE OF INSULIN (HCC): ICD-10-CM

## 2024-11-20 DIAGNOSIS — Z23 NEED FOR INFLUENZA VACCINATION: ICD-10-CM

## 2024-11-20 DIAGNOSIS — E03.9 HYPOTHYROIDISM, UNSPECIFIED TYPE: ICD-10-CM

## 2024-11-20 DIAGNOSIS — R41.3 IMPAIRED MEMORY: ICD-10-CM

## 2024-11-20 ASSESSMENT — ENCOUNTER SYMPTOMS
EYE DISCHARGE: 0
NAUSEA: 0
WHEEZING: 0
COUGH: 0
BACK PAIN: 0
SHORTNESS OF BREATH: 0
ABDOMINAL PAIN: 0
VOMITING: 0
SINUS PRESSURE: 0

## 2024-11-20 ASSESSMENT — PATIENT HEALTH QUESTIONNAIRE - PHQ9
SUM OF ALL RESPONSES TO PHQ QUESTIONS 1-9: 0
SUM OF ALL RESPONSES TO PHQ QUESTIONS 1-9: 0
1. LITTLE INTEREST OR PLEASURE IN DOING THINGS: NOT AT ALL
SUM OF ALL RESPONSES TO PHQ QUESTIONS 1-9: 0
2. FEELING DOWN, DEPRESSED OR HOPELESS: NOT AT ALL
SUM OF ALL RESPONSES TO PHQ9 QUESTIONS 1 & 2: 0
SUM OF ALL RESPONSES TO PHQ QUESTIONS 1-9: 0

## 2024-11-20 NOTE — PROGRESS NOTES
Chief Complaint   Patient presents with    Medicare AWV       Have you seen any other physician or provider since your last visit no    Have you had any other diagnostic tests since your last visit? no    Have you changed or stopped any medications since your last visit? no       
week do you engage in moderate to strenuous exercise (like a brisk walk)?: 2 days (!) Abnormal  On average, how many minutes do you engage in exercise at this level?: 10 min    Poor Eating Habits/Diet:  Do you eat balanced/healthy meals regularly?: (!) No    Abnormal BMI (obese):  Body mass index is 44.89 kg/m². (!) Abnormal      Health Habits/Nutrition Interventions:  Inadequate physical activity:  patient agrees to exercise for at least 150 minutes/week       Personalized Preventive Plan   Current Health Maintenance Status  Immunization History   Administered Date(s) Administered    COVID-19, MODERNA BLUE border, Primary or Immunocompromised, (age 12y+), IM, 100 mcg/0.5mL 03/11/2021    Influenza, AFLURIA (age 3 y+), FLUZONE, (age 6 mo+), Quadv MDV, 0.5mL 09/24/2020    Influenza, FLUAD, (age 65 y+), IM, Quadv, 0.5mL 09/28/2021    Pneumococcal, PCV20, PREVNAR 20, (age 6w+), IM, 0.5mL 05/21/2024    Pneumococcal, PPSV23, PNEUMOVAX 23, (age 2y+), SC/IM, 0.5mL 08/20/2020        Health Maintenance   Topic Date Due    Diabetic foot exam  Never done    Shingles vaccine (1 of 2) Never done    Respiratory Syncytial Virus (RSV) Pregnant or age 60 yrs+ (1 - Risk 60-74 years 1-dose series) Never done    Colorectal Cancer Screen  10/13/2023    Annual Wellness Visit (Medicare Advantage)  01/01/2024    Flu vaccine (1) 08/01/2024    COVID-19 Vaccine (3 - 2023-24 season) 09/01/2024    Depression Screen  01/18/2025    Diabetic Alb to Cr ratio (uACR) test  06/12/2025    A1C test (Diabetic or Prediabetic)  09/25/2025    Lipids  09/25/2025    GFR test (Diabetes, CKD 3-4, OR last GFR 15-59)  09/25/2025    Diabetic retinal exam  09/26/2025    DTaP/Tdap/Td vaccine (2 - Td or Tdap) 08/26/2034    Pneumococcal 65+ years Vaccine  Completed    Hepatitis C screen  Completed    Hepatitis A vaccine  Aged Out    Hepatitis B vaccine  Aged Out    Hib vaccine  Aged Out    Polio vaccine  Aged Out    Meningococcal (ACWY) vaccine  Aged Out    Prostate

## 2024-11-20 NOTE — PATIENT INSTRUCTIONS
you have symptoms of a heart attack. These may include:    Chest pain or pressure, or a strange feeling in the chest.     Sweating.     Shortness of breath.     Pain, pressure, or a strange feeling in the back, neck, jaw, or upper belly or in one or both shoulders or arms.     Lightheadedness or sudden weakness.     A fast or irregular heartbeat.   After you call 911, the  may tell you to chew 1 adult-strength or 2 to 4 low-dose aspirin. Wait for an ambulance. Do not try to drive yourself.  Watch closely for changes in your health, and be sure to contact your doctor if you have any problems.  Where can you learn more?  Go to https://www.[employer].net/patientEd and enter F075 to learn more about \"A Healthy Heart: Care Instructions.\"  Current as of: June 24, 2023  Content Version: 14.2  © 2024 [employer].   Care instructions adapted under license by [employer]. If you have questions about a medical condition or this instruction, always ask your healthcare professional. [employer] disclaims any warranty or liability for your use of this information.      Personalized Preventive Plan for Alfredo Perez - 11/20/2024  Medicare offers a range of preventive health benefits. Some of the tests and screenings are paid in full while other may be subject to a deductible, co-insurance, and/or copay.    Some of these benefits include a comprehensive review of your medical history including lifestyle, illnesses that may run in your family, and various assessments and screenings as appropriate.    After reviewing your medical record and screening and assessments performed today your provider may have ordered immunizations, labs, imaging, and/or referrals for you.  A list of these orders (if applicable) as well as your Preventive Care list are included within your After Visit Summary for your review.    Other Preventive Recommendations:    A preventive eye exam performed by an eye specialist is

## 2024-12-04 LAB — NONINV COLON CA DNA+OCC BLD SCRN STL QL: NEGATIVE

## 2024-12-19 RX ORDER — ASCORBIC ACID 500 MG
500 TABLET ORAL DAILY
Qty: 90 TABLET | Refills: 3 | Status: SHIPPED | OUTPATIENT
Start: 2024-12-19

## 2024-12-19 RX ORDER — TRAZODONE HYDROCHLORIDE 100 MG/1
TABLET ORAL
Qty: 90 TABLET | Refills: 3 | Status: SHIPPED | OUTPATIENT
Start: 2024-12-19

## 2024-12-19 RX ORDER — FINASTERIDE 5 MG/1
5 TABLET, FILM COATED ORAL DAILY
Qty: 90 TABLET | Refills: 3 | Status: SHIPPED | OUTPATIENT
Start: 2024-12-19

## 2024-12-19 RX ORDER — CARVEDILOL 12.5 MG/1
12.5 TABLET ORAL 2 TIMES DAILY
Qty: 180 TABLET | Refills: 3 | Status: SHIPPED | OUTPATIENT
Start: 2024-12-19

## 2024-12-19 RX ORDER — FOLIC ACID 1 MG/1
1000 TABLET ORAL DAILY
Qty: 90 TABLET | Refills: 3 | Status: SHIPPED | OUTPATIENT
Start: 2024-12-19

## 2024-12-19 RX ORDER — LEVOTHYROXINE SODIUM 88 UG/1
TABLET ORAL
Qty: 90 TABLET | Refills: 3 | Status: SHIPPED | OUTPATIENT
Start: 2024-12-19

## 2024-12-19 RX ORDER — TAMSULOSIN HYDROCHLORIDE 0.4 MG/1
0.4 CAPSULE ORAL DAILY
Qty: 90 CAPSULE | Refills: 3 | Status: SHIPPED | OUTPATIENT
Start: 2024-12-19

## 2024-12-19 RX ORDER — FERROUS SULFATE 324(65)MG
TABLET, DELAYED RELEASE (ENTERIC COATED) ORAL
Qty: 90 TABLET | Refills: 3 | Status: SHIPPED | OUTPATIENT
Start: 2024-12-19

## 2024-12-23 RX ORDER — FOLIC ACID 1 MG/1
1000 TABLET ORAL DAILY
Qty: 90 TABLET | Refills: 1 | Status: SHIPPED | OUTPATIENT
Start: 2024-12-23

## 2024-12-23 RX ORDER — ASCORBIC ACID 500 MG
500 TABLET ORAL DAILY
Qty: 90 TABLET | Refills: 1 | Status: SHIPPED | OUTPATIENT
Start: 2024-12-23

## 2024-12-23 RX ORDER — FERROUS SULFATE 324(65)MG
324 TABLET, DELAYED RELEASE (ENTERIC COATED) ORAL
Qty: 90 TABLET | Refills: 1 | Status: SHIPPED | OUTPATIENT
Start: 2024-12-23

## 2024-12-31 RX ORDER — SEMAGLUTIDE 1.34 MG/ML
INJECTION, SOLUTION SUBCUTANEOUS
Qty: 3 ML | Refills: 3 | OUTPATIENT
Start: 2024-12-31

## 2024-12-31 NOTE — TELEPHONE ENCOUNTER
Pt called requesting refill on Ozempic, reports is taking 2mg weekly and not having any issues with that dose. Last strength noted in chart is 1mg. Dolores Spear

## 2025-01-08 ENCOUNTER — TELEPHONE (OUTPATIENT)
Age: 70
End: 2025-01-08

## 2025-01-08 DIAGNOSIS — E11.22 TYPE 2 DIABETES MELLITUS WITH STAGE 3A CHRONIC KIDNEY DISEASE, WITH LONG-TERM CURRENT USE OF INSULIN (HCC): Primary | ICD-10-CM

## 2025-01-08 DIAGNOSIS — N18.31 TYPE 2 DIABETES MELLITUS WITH STAGE 3A CHRONIC KIDNEY DISEASE, WITH LONG-TERM CURRENT USE OF INSULIN (HCC): Primary | ICD-10-CM

## 2025-01-08 DIAGNOSIS — Z79.4 TYPE 2 DIABETES MELLITUS WITH STAGE 3A CHRONIC KIDNEY DISEASE, WITH LONG-TERM CURRENT USE OF INSULIN (HCC): Primary | ICD-10-CM

## 2025-01-08 RX ORDER — SEMAGLUTIDE 1.34 MG/ML
1 INJECTION, SOLUTION SUBCUTANEOUS
Qty: 3 ML | Refills: 0 | Status: SHIPPED
Start: 2025-01-08 | End: 2025-01-08

## 2025-01-08 RX ORDER — MECLIZINE HYDROCHLORIDE 25 MG/1
TABLET ORAL
Qty: 270 TABLET | Refills: 3 | Status: SHIPPED | OUTPATIENT
Start: 2025-01-08

## 2025-01-08 RX ORDER — SEMAGLUTIDE 2.68 MG/ML
2 INJECTION, SOLUTION SUBCUTANEOUS
Qty: 3 ML | Refills: 1 | Status: SHIPPED | OUTPATIENT
Start: 2025-01-08

## 2025-01-08 NOTE — TELEPHONE ENCOUNTER
[facility] called stating that pt was there wanting Ozempic 2mg weekly, states you told him you would increase it \"not having any issues with 1mg\"

## 2025-01-14 RX ORDER — HYDRALAZINE HYDROCHLORIDE 100 MG/1
TABLET, FILM COATED ORAL
Qty: 90 TABLET | Refills: 1 | Status: SHIPPED | OUTPATIENT
Start: 2025-01-14

## 2025-01-24 ENCOUNTER — OFFICE VISIT (OUTPATIENT)
Dept: ORTHOPEDIC SURGERY | Facility: CLINIC | Age: 70
End: 2025-01-24
Payer: MEDICARE

## 2025-01-24 VITALS
SYSTOLIC BLOOD PRESSURE: 190 MMHG | WEIGHT: 315 LBS | BODY MASS INDEX: 42.66 KG/M2 | DIASTOLIC BLOOD PRESSURE: 81 MMHG | HEIGHT: 72 IN

## 2025-01-24 DIAGNOSIS — G89.29 CHRONIC LEFT SHOULDER PAIN: Chronic | ICD-10-CM

## 2025-01-24 DIAGNOSIS — M25.512 CHRONIC LEFT SHOULDER PAIN: Chronic | ICD-10-CM

## 2025-01-24 DIAGNOSIS — M75.42 SUBACROMIAL IMPINGEMENT OF LEFT SHOULDER: Primary | ICD-10-CM

## 2025-01-24 DIAGNOSIS — M19.012 ARTHRITIS OF LEFT GLENOHUMERAL JOINT: ICD-10-CM

## 2025-01-24 RX ORDER — LIDOCAINE HYDROCHLORIDE 20 MG/ML
2 INJECTION, SOLUTION INFILTRATION; PERINEURAL
Status: COMPLETED | OUTPATIENT
Start: 2025-01-24 | End: 2025-01-24

## 2025-01-24 RX ORDER — METHYLPREDNISOLONE ACETATE 40 MG/ML
40 INJECTION, SUSPENSION INTRA-ARTICULAR; INTRALESIONAL; INTRAMUSCULAR; SOFT TISSUE
Status: COMPLETED | OUTPATIENT
Start: 2025-01-24 | End: 2025-01-24

## 2025-01-24 RX ORDER — LEVOTHYROXINE SODIUM 88 UG/1
1 TABLET ORAL DAILY
COMMUNITY
Start: 2024-12-19

## 2025-01-24 RX ADMIN — METHYLPREDNISOLONE ACETATE 40 MG: 40 INJECTION, SUSPENSION INTRA-ARTICULAR; INTRALESIONAL; INTRAMUSCULAR; SOFT TISSUE at 08:54

## 2025-01-24 RX ADMIN — LIDOCAINE HYDROCHLORIDE 2 ML: 20 INJECTION, SOLUTION INFILTRATION; PERINEURAL at 08:54

## 2025-01-24 NOTE — PROGRESS NOTES
"    Office Note     Name: Thomas Briceño    : 1955     MRN: [id number]     Chief Complaint  No chief complaint on file.    Subjective     History of Present Illness:  Thomas Briceño is a 69 y.o. male here today for left shoulder follow-up and injection therapy.  Patient states that his symptoms continue to improve and he has been getting relief from the injections for longer.  He does continue to have mild to moderate pain in his left shoulder, usually 2-1/2 months after his injections.  He denies any new or worsening symptoms since his last visit.    Review of Systems     Past Medical History:   Diagnosis Date    Acid reflux     Anxiety     Arthritis     Chronic kidney disease, stage 3 (moderate)     Depression     Diabetes     Exertional shortness of breath 2018    Family history of malignant hyperthermia     pt states his grandaughter had it - when asked if her body temperature was critically elevated, pt answered yes, 24    Gout     Hyperlipidemia     Hypertension     Hypothyroidism     Sleep apnea     uses CPAP sometimes         Past Surgical History:   Procedure Laterality Date    CATARACT EXTRACTION W/ INTRAOCULAR LENS IMPLANT Right 2024    Procedure: CATARACT PHACO EXTRACTION WITH INTRAOCULAR LENS IMPLANT RIGHT;  Surgeon: Justin Gao MD;  Location: [facility] OR;  Service: Ophthalmology;  Laterality: Right;    CATARACT EXTRACTION W/ INTRAOCULAR LENS IMPLANT Left 10/10/2024    Procedure: CATARACT PHACO EXTRACTION WITH INTRAOCULAR LENS IMPLANT LEFT;  Surgeon: Justin Gao MD;  Location: [facility];  Service: Ophthalmology;  Laterality: Left;    NO PAST SURGERIES         Allergies   Allergen Reactions    Atenolol Other (See Comments)     dizzy    Meloxicam Dizziness     Dizzy, unable to focus         Objective   BP (!) 190/81 Comment: STATES HIS BP IS HIGH WHEN AT DRS OFFICE  Ht 182.9 cm (72.01\")   Wt (!) 157 kg (346 lb)   BMI 46.92 kg/m²    Physical Exam  Ortho Exam    Skin " exam stable with no erythema, ecchymosis or rash.  No new swelling.  No motor or sensory changes.  Distal pulse intact.    Large Joint Arthrocentesis: L subacromial bursa  Date/Time: 1/24/2025 8:54 AM  Consent given by: patient  Site marked: site marked  Timeout: Immediately prior to procedure a time out was called to verify the correct patient, procedure, equipment, support staff and site/side marked as required   Supporting Documentation  Indications: pain   Procedure Details  Location: shoulder - L subacromial bursa  Preparation: Patient was prepped and draped in the usual sterile fashion  Needle size: 22 G  Medications administered: 40 mg methylPREDNISolone acetate 40 MG/ML; 2 mL lidocaine 2%  Patient tolerance: patient tolerated the procedure well with no immediate complications        Assessment and Plan      Diagnosis Plan   1. Subacromial impingement of left shoulder        2. Arthritis of left glenohumeral joint        3. Chronic left shoulder pain            Discussion of orthopaedic goals and activities and patient expressed appreciation.  Regular exercise as tolerated  Ice, heat, and/or modalities as beneficial  Watch for signs and symptoms of infection  Call or notify for any adverse effect from injection therapy    Recommendations:  Usual activities, routine exercise as tolerated, light physical work as tolerated, no strenuous activity.    I had a lengthy discussion with the patient including the risk and benefits of repeated injections into the left subacromial space.  Discussed alternative treatments such as left shoulder arthroscopy and subacromial decompression.  Patient states that he is not interested in surgery at this time and understands the risks and benefits and elects to continue with injection therapy.  I advised follow-up with me as needed concerning the left shoulder and we will continue with injection therapy as long as it remains needed and beneficial.    Return in about 3 months  (around 4/24/2025), or if symptoms worsen or fail to improve.  Patient agreeable to call or return sooner for any concerns.

## 2025-01-30 ENCOUNTER — OFFICE VISIT (OUTPATIENT)
Age: 70
End: 2025-01-30
Payer: MEDICARE

## 2025-01-30 ENCOUNTER — HOSPITAL ENCOUNTER (OUTPATIENT)
Facility: HOSPITAL | Age: 70
Discharge: HOME | End: 2025-01-30
Payer: MEDICARE

## 2025-01-30 VITALS
SYSTOLIC BLOOD PRESSURE: 164 MMHG | DIASTOLIC BLOOD PRESSURE: 80 MMHG | BODY MASS INDEX: 46.74 KG/M2 | RESPIRATION RATE: 18 BRPM | WEIGHT: 315 LBS | HEART RATE: 66 BPM | OXYGEN SATURATION: 95 %

## 2025-01-30 DIAGNOSIS — E03.9 HYPOTHYROIDISM, UNSPECIFIED TYPE: ICD-10-CM

## 2025-01-30 DIAGNOSIS — I10 PRIMARY HYPERTENSION: ICD-10-CM

## 2025-01-30 DIAGNOSIS — E11.22 TYPE 2 DIABETES MELLITUS WITH STAGE 3A CHRONIC KIDNEY DISEASE, WITH LONG-TERM CURRENT USE OF INSULIN (HCC): Primary | ICD-10-CM

## 2025-01-30 DIAGNOSIS — N18.31 TYPE 2 DIABETES MELLITUS WITH STAGE 3A CHRONIC KIDNEY DISEASE, WITH LONG-TERM CURRENT USE OF INSULIN (HCC): ICD-10-CM

## 2025-01-30 DIAGNOSIS — Z79.4 TYPE 2 DIABETES MELLITUS WITH STAGE 3A CHRONIC KIDNEY DISEASE, WITH LONG-TERM CURRENT USE OF INSULIN (HCC): ICD-10-CM

## 2025-01-30 DIAGNOSIS — E11.22 TYPE 2 DIABETES MELLITUS WITH STAGE 3A CHRONIC KIDNEY DISEASE, WITH LONG-TERM CURRENT USE OF INSULIN (HCC): ICD-10-CM

## 2025-01-30 DIAGNOSIS — N18.31 TYPE 2 DIABETES MELLITUS WITH STAGE 3A CHRONIC KIDNEY DISEASE, WITH LONG-TERM CURRENT USE OF INSULIN (HCC): Primary | ICD-10-CM

## 2025-01-30 DIAGNOSIS — E78.5 HYPERLIPIDEMIA, UNSPECIFIED HYPERLIPIDEMIA TYPE: ICD-10-CM

## 2025-01-30 DIAGNOSIS — Z79.4 TYPE 2 DIABETES MELLITUS WITH STAGE 3A CHRONIC KIDNEY DISEASE, WITH LONG-TERM CURRENT USE OF INSULIN (HCC): Primary | ICD-10-CM

## 2025-01-30 PROCEDURE — 1160F RVW MEDS BY RX/DR IN RCRD: CPT | Performed by: INTERNAL MEDICINE

## 2025-01-30 PROCEDURE — 1036F TOBACCO NON-USER: CPT | Performed by: INTERNAL MEDICINE

## 2025-01-30 PROCEDURE — G8427 DOCREV CUR MEDS BY ELIG CLIN: HCPCS | Performed by: INTERNAL MEDICINE

## 2025-01-30 PROCEDURE — 99214 OFFICE O/P EST MOD 30 MIN: CPT | Performed by: INTERNAL MEDICINE

## 2025-01-30 PROCEDURE — 3051F HG A1C>EQUAL 7.0%<8.0%: CPT | Performed by: INTERNAL MEDICINE

## 2025-01-30 PROCEDURE — 83036 HEMOGLOBIN GLYCOSYLATED A1C: CPT

## 2025-01-30 PROCEDURE — 3079F DIAST BP 80-89 MM HG: CPT | Performed by: INTERNAL MEDICINE

## 2025-01-30 PROCEDURE — G8417 CALC BMI ABV UP PARAM F/U: HCPCS | Performed by: INTERNAL MEDICINE

## 2025-01-30 PROCEDURE — 2022F DILAT RTA XM EVC RTNOPTHY: CPT | Performed by: INTERNAL MEDICINE

## 2025-01-30 PROCEDURE — 1123F ACP DISCUSS/DSCN MKR DOCD: CPT | Performed by: INTERNAL MEDICINE

## 2025-01-30 PROCEDURE — 3017F COLORECTAL CA SCREEN DOC REV: CPT | Performed by: INTERNAL MEDICINE

## 2025-01-30 PROCEDURE — 1159F MED LIST DOCD IN RCRD: CPT | Performed by: INTERNAL MEDICINE

## 2025-01-30 PROCEDURE — 3077F SYST BP >= 140 MM HG: CPT | Performed by: INTERNAL MEDICINE

## 2025-01-30 PROCEDURE — 80053 COMPREHEN METABOLIC PANEL: CPT

## 2025-01-30 RX ORDER — SEMAGLUTIDE 2.68 MG/ML
2 INJECTION, SOLUTION SUBCUTANEOUS
Qty: 9 ML | Refills: 3 | Status: SHIPPED | OUTPATIENT
Start: 2025-01-30

## 2025-01-30 RX ORDER — CLONIDINE HYDROCHLORIDE 0.1 MG/1
0.1 TABLET ORAL 2 TIMES DAILY PRN
COMMUNITY

## 2025-01-30 RX ORDER — CLONIDINE HYDROCHLORIDE 0.1 MG/1
0.1 TABLET ORAL 2 TIMES DAILY PRN
Qty: 60 TABLET | Refills: 0 | Status: SHIPPED | OUTPATIENT
Start: 2025-01-30

## 2025-01-30 ASSESSMENT — PATIENT HEALTH QUESTIONNAIRE - PHQ9
SUM OF ALL RESPONSES TO PHQ QUESTIONS 1-9: 0
2. FEELING DOWN, DEPRESSED OR HOPELESS: NOT AT ALL
SUM OF ALL RESPONSES TO PHQ QUESTIONS 1-9: 0
SUM OF ALL RESPONSES TO PHQ9 QUESTIONS 1 & 2: 0
1. LITTLE INTEREST OR PLEASURE IN DOING THINGS: NOT AT ALL

## 2025-01-30 NOTE — PROGRESS NOTES
SUBJECTIVE:    Patient ID: Alfredo Perez is a 69 y.o.male.    Chief Complaint   Patient presents with    Diabetes    Hypertension         HPI:  Patient has had diabetes for the past several years.  He has been compliant with the medications and denies any side effects from it. He has been monitoring fingersticks on a daily basis.  His fingerstick range is between 100-160. He denies any hypoglycemic symptoms. He has been following a diabetic diet and has been active.  His last eye exam was less than a year ago.  He is using oral meds, Ozempic and Insulin.     BP is elevated during this visit.  Patient has gained about 14 pounds compared to last visit.  He reported that pharmacy has provided him with 1 mg of Ozempic instead of the 2 mg.  He has been compliant with taking his medications.    Patient's medications, allergies, past medical, surgical, social and family histories were reviewed and updated as appropriate in electronic medical record.        Outpatient Medications Marked as Taking for the 1/30/25 encounter (Office Visit) with Starr Morrison MD   Medication Sig Dispense Refill    cloNIDine (CATAPRES) 0.1 MG tablet Take 1 tablet by mouth 2 times daily as needed for High Blood Pressure      cloNIDine (CATAPRES) 0.1 MG tablet Take 1 tablet by mouth 2 times daily as needed for High Blood Pressure (take if top number 150 or greater) 60 tablet 0    semaglutide, 2 MG/DOSE, (OZEMPIC, 2 MG/DOSE,) 8 MG/3ML SOPN sc injection Inject 2 mg into the skin every 7 days 9 mL 3    hydrALAZINE (APRESOLINE) 100 MG tablet TAKE ONE TABLET MOUTH EVERY 8 HOURS 90 tablet 1    meclizine (ANTIVERT) 25 MG tablet TAKE 1 TABLET THREE TIMES DAILY AS NEEDED FOR DIZZINESS 270 tablet 3    ferrous sulfate 324 (65 Fe) MG EC tablet Take 1 tablet by mouth daily (with breakfast) TAKE 1 TABLET EVERY DAY WITH BREAKFAST 90 tablet 1    folic acid (FOLVITE) 1 MG tablet Take 1 tablet by mouth daily 90 tablet 1    vitamin C (ASCORBIC ACID) 500 MG tablet

## 2025-01-30 NOTE — PROGRESS NOTES
Chief Complaint   Patient presents with    Diabetes    Hypertension     Fs avg 100-170    Lantus 20 nightly      Have you seen any other physician or provider since your last visit yes - orthopedics     Have you had any other diagnostic tests since your last visit? no    Have you changed or stopped any medications since your last visit? no

## 2025-01-31 LAB
ALBUMIN SERPL-MCNC: 4.5 G/DL (ref 3.4–4.8)
ALBUMIN/GLOB SERPL: 1.7 {RATIO} (ref 0.8–2)
ALP SERPL-CCNC: 107 U/L (ref 25–100)
ALT SERPL-CCNC: 18 U/L (ref 4–36)
ANION GAP SERPL CALCULATED.3IONS-SCNC: 15 MMOL/L (ref 3–16)
AST SERPL-CCNC: 14 U/L (ref 8–33)
BILIRUB SERPL-MCNC: <0.2 MG/DL (ref 0.3–1.2)
BUN SERPL-MCNC: 22 MG/DL (ref 6–20)
CALCIUM SERPL-MCNC: 9.7 MG/DL (ref 8.5–10.5)
CHLORIDE SERPL-SCNC: 103 MMOL/L (ref 98–107)
CO2 SERPL-SCNC: 23 MMOL/L (ref 20–30)
CREAT SERPL-MCNC: 1.3 MG/DL (ref 0.4–1.2)
GFR SERPLBLD CREATININE-BSD FMLA CKD-EPI: 58 ML/MIN/{1.73_M2}
GLOBULIN SER CALC-MCNC: 2.6 G/DL
GLUCOSE SERPL-MCNC: 170 MG/DL (ref 74–106)
HBA1C MFR BLD: 7.1 %
POTASSIUM SERPL-SCNC: 4.6 MMOL/L (ref 3.4–5.1)
PROT SERPL-MCNC: 7.1 G/DL (ref 6.4–8.3)
SODIUM SERPL-SCNC: 141 MMOL/L (ref 136–145)

## 2025-02-06 ENCOUNTER — LAB (OUTPATIENT)
Age: 70
End: 2025-02-06

## 2025-02-06 VITALS — SYSTOLIC BLOOD PRESSURE: 111 MMHG | DIASTOLIC BLOOD PRESSURE: 72 MMHG

## 2025-02-06 DIAGNOSIS — I10 PRIMARY HYPERTENSION: Primary | ICD-10-CM

## 2025-02-06 NOTE — PROGRESS NOTES
IMPORTANT INFORMATION REGARDING YOUR SURGERY    You have been scheduled for surgery at [facility].  Please follow these instructions carefully.    PRE OPERATIVE EXAM APPOINTMENT WITH            DATE/TIME     AM/PM LOCATION         Must be 30 days prior to surgery!!!!   *SURGERY DATE/TIME* -  2/5/25 arrival of 0900  Arrive at the Patient Registration Department on the 1st  floor of the [facility] building at [address] [place] [address] on __                    __at ____              ___ AM/PM.        PLEASE DO NOT EAT ANYTHING (including hard candy, coffee, cough drops) after midnight the night prior to your procedure. You may drink PLAIN water up until an hour before you leave your home.  *Wear loose, comfortable, casual clothing.  *Leave all jewelry (including rings, necklaces, bracelets, watches, piercings), money and any other valuables at home.  *Shower/Bathe the night before AND the morning of surgery with HIBICLENS.   *Do NOT apply hair product(s), deodorant, or lotions after showering with HIBICLENS.  *If you smoke, attempt to quit or at least cut down before surgery.     TRANSPORTATION  *You CANNOT drive after the procedure because you will receive anesthesia. A responsible adult must accompany you from the hospital on the day of your procedure. You may not leave by yourself or drive yourself home.       MEDICATIONS  *Take medications WITH A SIP OF WATER before you come to the hospital (Bring all medications you are currently taking to the hospital on day of surgery)   *Your surgery may be CANCELLED and need to be RESCHEDULED if any of the following apply:  Failure to stop taking Coumadin, Warfarin, Plavix, Clopidogrel ,Dabigatran, Pradaxa, Aspirin, Aggrenox  or any type of blood thinner medication 5 days prior to Surgery.  Failure to stop taking Dulaglutide (Trilicity), Liglutide (Victoza, Saxenda), Semaglutide (Ozempic, Wegovy), Tirzeptide (Mounjaro, Zepbound)  Chief Complaint   Patient presents with    Hypertension       Patient came by the office today for BP check. It was 111/72 in the office today. Patient reports improvement since his last medication change.           or any GLP-1 agonist 7 days prior to your procedure.   Failure to discontinue the use of diet pills and herbal supplements 14 days prior to surgery.  Failure to stop taking recreational drugs, such as cocaine, heroin, methadone, etc., two weeks prior to Surgery. Please remember to consult with your primary care provider and/or pharmacist about your current medication regimen. Some larger pills may be able to be changed into smaller pills or liquid form.    Cancelation/No Show  Your procedure is considered cancelled if you cancel 24 hours or more in advance of the scheduled procedure. We will be glad to assist in rescheduling.  Your procedure is considered no show if you fail to show up or cancel less than 24 hours before the scheduled procedure. An exception can be made for special circumstance, such as, but not limited to: severe illness, automobile accident or family emergency.  ** No shows and late cancellations may result in charges to your insurance company of which you may be responsible for a portion of the balance. Repeated no shows and/or cancellations may result in [facility]’s refusal to schedule future surgeries for non-emergent cases as well as termination of your surgeon/patient relationship.**    If you have any questions or concerns before or after your surgery, please feel free to call the clinic office at [phone].  The professional surgical team will be pleased to assist you with your needs.

## 2025-03-03 RX ORDER — CLONIDINE HYDROCHLORIDE 0.1 MG/1
TABLET ORAL
Qty: 60 TABLET | Refills: 0 | Status: SHIPPED | OUTPATIENT
Start: 2025-03-03

## 2025-04-02 RX ORDER — HYDRALAZINE HYDROCHLORIDE 100 MG/1
TABLET, FILM COATED ORAL
Qty: 90 TABLET | Refills: 2 | Status: SHIPPED | OUTPATIENT
Start: 2025-04-02

## 2025-04-04 RX ORDER — ALLOPURINOL 100 MG/1
100 TABLET ORAL DAILY
Qty: 90 TABLET | Refills: 1 | Status: SHIPPED | OUTPATIENT
Start: 2025-04-04

## 2025-04-06 DIAGNOSIS — I10 PRIMARY HYPERTENSION: ICD-10-CM

## 2025-04-07 DIAGNOSIS — K21.9 GASTROESOPHAGEAL REFLUX DISEASE, UNSPECIFIED WHETHER ESOPHAGITIS PRESENT: Chronic | ICD-10-CM

## 2025-04-07 RX ORDER — OMEPRAZOLE 40 MG/1
CAPSULE, DELAYED RELEASE ORAL
Qty: 90 CAPSULE | Refills: 1 | Status: SHIPPED | OUTPATIENT
Start: 2025-04-07

## 2025-04-07 RX ORDER — LISINOPRIL 20 MG/1
20 TABLET ORAL DAILY
Qty: 90 TABLET | Refills: 1 | Status: SHIPPED | OUTPATIENT
Start: 2025-04-07

## 2025-04-15 RX ORDER — CLONIDINE HYDROCHLORIDE 0.1 MG/1
TABLET ORAL
Qty: 60 TABLET | Refills: 0 | Status: SHIPPED | OUTPATIENT
Start: 2025-04-15

## 2025-04-30 ENCOUNTER — OFFICE VISIT (OUTPATIENT)
Age: 70
End: 2025-04-30
Payer: MEDICARE

## 2025-04-30 VITALS
SYSTOLIC BLOOD PRESSURE: 104 MMHG | DIASTOLIC BLOOD PRESSURE: 69 MMHG | BODY MASS INDEX: 47.06 KG/M2 | HEART RATE: 62 BPM | WEIGHT: 315 LBS | OXYGEN SATURATION: 92 % | RESPIRATION RATE: 18 BRPM

## 2025-04-30 DIAGNOSIS — N18.31 TYPE 2 DIABETES MELLITUS WITH STAGE 3A CHRONIC KIDNEY DISEASE, WITH LONG-TERM CURRENT USE OF INSULIN (HCC): Primary | ICD-10-CM

## 2025-04-30 DIAGNOSIS — Z79.4 TYPE 2 DIABETES MELLITUS WITH STAGE 3A CHRONIC KIDNEY DISEASE, WITH LONG-TERM CURRENT USE OF INSULIN (HCC): Primary | ICD-10-CM

## 2025-04-30 DIAGNOSIS — E78.5 HYPERLIPIDEMIA, UNSPECIFIED HYPERLIPIDEMIA TYPE: ICD-10-CM

## 2025-04-30 DIAGNOSIS — I10 PRIMARY HYPERTENSION: ICD-10-CM

## 2025-04-30 DIAGNOSIS — N18.31 STAGE 3A CHRONIC KIDNEY DISEASE (HCC): ICD-10-CM

## 2025-04-30 DIAGNOSIS — E11.22 TYPE 2 DIABETES MELLITUS WITH STAGE 3A CHRONIC KIDNEY DISEASE, WITH LONG-TERM CURRENT USE OF INSULIN (HCC): Primary | ICD-10-CM

## 2025-04-30 DIAGNOSIS — E03.9 HYPOTHYROIDISM, UNSPECIFIED TYPE: ICD-10-CM

## 2025-04-30 PROCEDURE — 3078F DIAST BP <80 MM HG: CPT | Performed by: INTERNAL MEDICINE

## 2025-04-30 PROCEDURE — 3074F SYST BP LT 130 MM HG: CPT | Performed by: INTERNAL MEDICINE

## 2025-04-30 PROCEDURE — 1159F MED LIST DOCD IN RCRD: CPT | Performed by: INTERNAL MEDICINE

## 2025-04-30 PROCEDURE — 1036F TOBACCO NON-USER: CPT | Performed by: INTERNAL MEDICINE

## 2025-04-30 PROCEDURE — 99213 OFFICE O/P EST LOW 20 MIN: CPT | Performed by: INTERNAL MEDICINE

## 2025-04-30 PROCEDURE — 3051F HG A1C>EQUAL 7.0%<8.0%: CPT | Performed by: INTERNAL MEDICINE

## 2025-04-30 PROCEDURE — G8427 DOCREV CUR MEDS BY ELIG CLIN: HCPCS | Performed by: INTERNAL MEDICINE

## 2025-04-30 PROCEDURE — 1123F ACP DISCUSS/DSCN MKR DOCD: CPT | Performed by: INTERNAL MEDICINE

## 2025-04-30 PROCEDURE — 1160F RVW MEDS BY RX/DR IN RCRD: CPT | Performed by: INTERNAL MEDICINE

## 2025-04-30 PROCEDURE — G8417 CALC BMI ABV UP PARAM F/U: HCPCS | Performed by: INTERNAL MEDICINE

## 2025-04-30 PROCEDURE — 3017F COLORECTAL CA SCREEN DOC REV: CPT | Performed by: INTERNAL MEDICINE

## 2025-04-30 PROCEDURE — 2022F DILAT RTA XM EVC RTNOPTHY: CPT | Performed by: INTERNAL MEDICINE

## 2025-04-30 RX ORDER — INSULIN GLARGINE 100 [IU]/ML
45 INJECTION, SOLUTION SUBCUTANEOUS NIGHTLY
Qty: 90 ML | Refills: 3 | Status: SHIPPED | OUTPATIENT
Start: 2025-04-30

## 2025-04-30 RX ORDER — PEN NEEDLE, DIABETIC
NEEDLE, DISPOSABLE MISCELLANEOUS
Qty: 100 EACH | Refills: 5 | Status: SHIPPED | OUTPATIENT
Start: 2025-04-30

## 2025-04-30 SDOH — ECONOMIC STABILITY: FOOD INSECURITY: WITHIN THE PAST 12 MONTHS, THE FOOD YOU BOUGHT JUST DIDN'T LAST AND YOU DIDN'T HAVE MONEY TO GET MORE.: NEVER TRUE

## 2025-04-30 SDOH — ECONOMIC STABILITY: FOOD INSECURITY: WITHIN THE PAST 12 MONTHS, YOU WORRIED THAT YOUR FOOD WOULD RUN OUT BEFORE YOU GOT MONEY TO BUY MORE.: NEVER TRUE

## 2025-04-30 ASSESSMENT — ENCOUNTER SYMPTOMS
WHEEZING: 0
SINUS PRESSURE: 0
NAUSEA: 0
SHORTNESS OF BREATH: 0
ABDOMINAL PAIN: 0
BACK PAIN: 0
COUGH: 0
VOMITING: 0
EYE DISCHARGE: 0

## 2025-04-30 NOTE — PROGRESS NOTES
every few months, have advised him to check blood pressure frequently and to keep a record of this.     - CBC with Auto Differential; Future  - Comprehensive Metabolic Panel; Future    3. Hypothyroidism, unspecified type  Last TSH was WNL. I am going to check the TSH every few months.  I am going to continue the current dose of Levothyroxine.  I have advised him on the importance of taking the medication on a daily basis.    Lab Results   Component Value Date    TSH 2.69 09/25/2024     - TSH; Future    4. Hyperlipidemia, unspecified hyperlipidemia type  I have advised him on low-fat diet, exercise and weight control. I am going to continue on current medication. I have also advised him on the possible side effects from the medication.  I will monitor his liver functions and lipid profile every few months.  Lipid well controlled.  Lab Results   Component Value Date    LDL 68 09/25/2024    LDLDIRECT 93 12/13/2016     - Comprehensive Metabolic Panel; Future    5. Stage 3a chronic kidney disease (HCC)  Baseline. I have advised him to avoid any nephrotoxic agents.  I am going to monitor renal function periodically.  I will make sure that the blood pressure and other medical problems are under good control.  Will refer to nephrology if the renal function should begin to deteriorate.  Renal function will be checked in few weeks. Last Creatinine is   Lab Results   Component Value Date    CREATININE 1.3 (H) 01/30/2025     - CBC with Auto Differential; Future  - Comprehensive Metabolic Panel; Future      Orders Placed This Encounter   Medications    insulin glargine (LANTUS SOLOSTAR) 100 UNIT/ML injection pen     Sig: Inject 45 Units into the skin nightly     Dispense:  90 mL     Refill:  3    Insulin Pen Needle (UNIFINE PENTIPS) 31G X 6 MM MISC     Sig: USE TO INJECT INSULIN EVERY DAY     Dispense:  100 each     Refill:  5    diclofenac sodium (VOLTAREN) 1 % GEL     Sig: Apply topically 2 times daily     Dispense:  150 g

## 2025-05-16 ENCOUNTER — TELEPHONE (OUTPATIENT)
Age: 70
End: 2025-05-16

## 2025-05-16 RX ORDER — BENZONATATE 200 MG/1
200 CAPSULE ORAL 3 TIMES DAILY PRN
Qty: 25 CAPSULE | Refills: 0 | Status: SHIPPED | OUTPATIENT
Start: 2025-05-16 | End: 2025-05-26

## 2025-05-16 NOTE — TELEPHONE ENCOUNTER
Per Dr Prince Prince low dose and Tessalon Perles sent to [facility] r/t jerry. Pt informed

## 2025-05-16 NOTE — TELEPHONE ENCOUNTER
Might be a candidate for Paxlovid given his weight and other medical problems.  Can he come in to be seen?

## 2025-05-18 ENCOUNTER — APPOINTMENT (OUTPATIENT)
Dept: GENERAL RADIOLOGY | Facility: HOSPITAL | Age: 70
End: 2025-05-18
Payer: MEDICARE

## 2025-05-18 ENCOUNTER — HOSPITAL ENCOUNTER (EMERGENCY)
Facility: HOSPITAL | Age: 70
Discharge: HOME | End: 2025-05-18
Attending: HOSPITALIST
Payer: MEDICARE

## 2025-05-18 VITALS
DIASTOLIC BLOOD PRESSURE: 74 MMHG | RESPIRATION RATE: 21 BRPM | HEIGHT: 72 IN | TEMPERATURE: 98 F | SYSTOLIC BLOOD PRESSURE: 149 MMHG | WEIGHT: 315 LBS | HEART RATE: 59 BPM | OXYGEN SATURATION: 94 % | BODY MASS INDEX: 42.66 KG/M2

## 2025-05-18 DIAGNOSIS — U07.1 PNEUMONIA DUE TO COVID-19 VIRUS: ICD-10-CM

## 2025-05-18 DIAGNOSIS — U07.1 COVID-19: Primary | ICD-10-CM

## 2025-05-18 DIAGNOSIS — J12.82 PNEUMONIA DUE TO COVID-19 VIRUS: ICD-10-CM

## 2025-05-18 LAB
ALBUMIN SERPL-MCNC: 4.2 G/DL (ref 3.4–4.8)
ALBUMIN/GLOB SERPL: 1.4 {RATIO} (ref 0.8–2)
ALP SERPL-CCNC: 98 U/L (ref 25–100)
ALT SERPL-CCNC: 15 U/L (ref 4–36)
ANION GAP SERPL CALCULATED.3IONS-SCNC: 12 MMOL/L (ref 3–16)
AST SERPL-CCNC: 19 U/L (ref 8–33)
BASOPHILS # BLD: 0.1 K/UL (ref 0–0.1)
BASOPHILS NFR BLD: 0.6 %
BILIRUB SERPL-MCNC: 0.3 MG/DL (ref 0.3–1.2)
BUN SERPL-MCNC: 15 MG/DL (ref 6–20)
CALCIUM SERPL-MCNC: 9.3 MG/DL (ref 8.3–10.6)
CHLORIDE SERPL-SCNC: 103 MMOL/L (ref 98–107)
CO2 SERPL-SCNC: 24 MMOL/L (ref 20–30)
CREAT SERPL-MCNC: 1.3 MG/DL (ref 0.4–1.2)
EOSINOPHIL # BLD: 1.3 K/UL (ref 0–0.4)
EOSINOPHIL NFR BLD: 13.2 %
ERYTHROCYTE [DISTWIDTH] IN BLOOD BY AUTOMATED COUNT: 12.1 % (ref 11–16)
GFR SERPLBLD CREATININE-BSD FMLA CKD-EPI: 58 ML/MIN/{1.73_M2}
GLOBULIN SER CALC-MCNC: 3 G/DL
GLUCOSE SERPL-MCNC: 125 MG/DL (ref 74–106)
HCT VFR BLD AUTO: 39.3 % (ref 40–54)
HGB BLD-MCNC: 13.2 G/DL (ref 13–18)
IMM GRANULOCYTES # BLD: 0 K/UL
IMM GRANULOCYTES NFR BLD: 0.3 % (ref 0–5)
LYMPHOCYTES # BLD: 3.1 K/UL (ref 1.5–4)
LYMPHOCYTES NFR BLD: 32.5 %
MCH RBC QN AUTO: 30.6 PG (ref 27–32)
MCHC RBC AUTO-ENTMCNC: 33.6 G/DL (ref 31–35)
MCV RBC AUTO: 91 FL (ref 80–100)
MONOCYTES # BLD: 0.5 K/UL (ref 0.2–0.8)
MONOCYTES NFR BLD: 5.3 %
NEUTROPHILS # BLD: 4.6 K/UL (ref 2–7.5)
NEUTS SEG NFR BLD: 48.1 %
NT-PROBNP SERPL-MCNC: 60 PG/ML (ref 0–1800)
PLATELET # BLD AUTO: 238 K/UL (ref 150–400)
PMV BLD AUTO: 10.1 FL (ref 6–10)
POTASSIUM SERPL-SCNC: 4.1 MMOL/L (ref 3.4–5.1)
PROT SERPL-MCNC: 7.2 G/DL (ref 6.4–8.3)
RBC # BLD AUTO: 4.32 M/UL (ref 4.5–6)
SODIUM SERPL-SCNC: 139 MMOL/L (ref 136–145)
TROPONIN, HIGH SENSITIVITY: 16 NG/L (ref 0–22)
WBC # BLD AUTO: 9.6 K/UL (ref 4–11)

## 2025-05-18 PROCEDURE — 36415 COLL VENOUS BLD VENIPUNCTURE: CPT

## 2025-05-18 PROCEDURE — 80053 COMPREHEN METABOLIC PANEL: CPT

## 2025-05-18 PROCEDURE — 83880 ASSAY OF NATRIURETIC PEPTIDE: CPT

## 2025-05-18 PROCEDURE — 2580000003 HC RX 258: Performed by: HOSPITALIST

## 2025-05-18 PROCEDURE — 85025 COMPLETE CBC W/AUTO DIFF WBC: CPT

## 2025-05-18 PROCEDURE — 99285 EMERGENCY DEPT VISIT HI MDM: CPT

## 2025-05-18 PROCEDURE — 84484 ASSAY OF TROPONIN QUANT: CPT

## 2025-05-18 PROCEDURE — 71045 X-RAY EXAM CHEST 1 VIEW: CPT

## 2025-05-18 RX ORDER — 0.9 % SODIUM CHLORIDE 0.9 %
500 INTRAVENOUS SOLUTION INTRAVENOUS ONCE
Status: COMPLETED | OUTPATIENT
Start: 2025-05-18 | End: 2025-05-18

## 2025-05-18 RX ORDER — DOXYCYCLINE 100 MG/1
100 CAPSULE ORAL 2 TIMES DAILY
Qty: 20 CAPSULE | Refills: 0 | Status: SHIPPED | OUTPATIENT
Start: 2025-05-18 | End: 2025-05-28

## 2025-05-18 RX ADMIN — SODIUM CHLORIDE 500 ML: 0.9 INJECTION, SOLUTION INTRAVENOUS at 17:31

## 2025-05-18 ASSESSMENT — LIFESTYLE VARIABLES
HOW OFTEN DO YOU HAVE A DRINK CONTAINING ALCOHOL: NEVER
HOW MANY STANDARD DRINKS CONTAINING ALCOHOL DO YOU HAVE ON A TYPICAL DAY: PATIENT DOES NOT DRINK

## 2025-05-18 NOTE — ED PROVIDER NOTES
troponin, proBNP and EKG performed.  Otherwise patient is resting comfortably stretcher no acute distress nontoxic-appearing.  Final disposition determined once his radiological diagnostic studies been performed reviewed.  Patient is not febrile.  He is not tachypneic tachycardic or hypoxic    Blood work showed white count 9600, hemoglobin is 13.2, hematocrit 39.3, platelet count is 238.  Comprehensive metabolic panel was benign except for a glucose of 125, creatinine of 1.3.  Troponin was normal at 16.  proBNP is negative at 60.    Portable chest radiograph read by radiology as mild patchy bibasilar airspace disease.    Patient's radiological diagnostic studies were discussed with him and they do state understanding.  Advised that there are findings for possible mild patchy bibasilar airspace disease which is most likely secondary to his COVID but we will put him on antibiotic for atypicals such as doxycycline.  Advised he does need to continue with his other medications that he has he already has inhaler.  He is already on Paxlovid which he started a day or so ago.  He has Tessalon Perles for his cough.  Otherwise his evaluation is benign here.  He is not tachycardic tachypneic or hypoxic.  Advised to continue with fluid resuscitation at home Tylenol Motrin for body aches fevers and chills.  He does need to follow-up with his regular family physician within the next 1 to 2 days for evaluation.  Also given instruction if the symptoms worsens or new symptoms arise he should return back to emergency department for further evaluation workup       CONSULTS: (Who and What was discussed)  None    Discussion with Other Profesionals : None    Social Determinants : None    Chronic Conditions:  has a past medical history of Chronic back pain, Chronic kidney disease, DDD (degenerative disc disease), lumbar, GERD (gastroesophageal reflux disease), Hypercholesteremia, Hypertension, Left lumbar radiculopathy, Obesity, Type II or

## 2025-05-18 NOTE — ED TRIAGE NOTES
Pt to ED with complaints of cough, dizziness when he stands up quickly and some shortness of breath. Pt was diagnosed with covid 5/17.

## 2025-05-18 NOTE — ED NOTES
Dc instructions given to patient at this time, patient to  rx from his pharmacy tomorrow. Patient aware to continue his other meds already prescribed for his covid. Patient to return for worsening. Patient with other questions or concerns.

## 2025-05-19 RX ORDER — CLONIDINE HYDROCHLORIDE 0.1 MG/1
TABLET ORAL
Qty: 180 TABLET | Refills: 0 | Status: SHIPPED | OUTPATIENT
Start: 2025-05-19

## 2025-05-22 ENCOUNTER — OFFICE VISIT (OUTPATIENT)
Dept: ORTHOPEDIC SURGERY | Facility: CLINIC | Age: 70
End: 2025-05-22
Payer: MEDICARE

## 2025-05-22 VITALS
HEIGHT: 72 IN | WEIGHT: 315 LBS | SYSTOLIC BLOOD PRESSURE: 136 MMHG | DIASTOLIC BLOOD PRESSURE: 88 MMHG | BODY MASS INDEX: 42.66 KG/M2

## 2025-05-22 DIAGNOSIS — M75.42 SUBACROMIAL IMPINGEMENT OF LEFT SHOULDER: Primary | ICD-10-CM

## 2025-05-22 RX ORDER — DOXYCYCLINE 100 MG/1
100 CAPSULE ORAL
COMMUNITY
Start: 2025-05-18 | End: 2025-05-28

## 2025-05-22 RX ORDER — SEMAGLUTIDE 2.68 MG/ML
2 INJECTION, SOLUTION SUBCUTANEOUS
COMMUNITY
Start: 2025-01-08

## 2025-05-22 RX ORDER — CYCLOBENZAPRINE HCL 10 MG
1 TABLET ORAL 3 TIMES DAILY
COMMUNITY
Start: 2025-05-02

## 2025-05-22 RX ORDER — LIDOCAINE HYDROCHLORIDE 20 MG/ML
2 INJECTION, SOLUTION INFILTRATION; PERINEURAL
Status: COMPLETED | OUTPATIENT
Start: 2025-05-22 | End: 2025-05-22

## 2025-05-22 RX ORDER — METHYLPREDNISOLONE ACETATE 40 MG/ML
40 INJECTION, SUSPENSION INTRA-ARTICULAR; INTRALESIONAL; INTRAMUSCULAR; SOFT TISSUE
Status: COMPLETED | OUTPATIENT
Start: 2025-05-22 | End: 2025-05-22

## 2025-05-22 RX ADMIN — LIDOCAINE HYDROCHLORIDE 2 ML: 20 INJECTION, SOLUTION INFILTRATION; PERINEURAL at 10:22

## 2025-05-22 RX ADMIN — METHYLPREDNISOLONE ACETATE 40 MG: 40 INJECTION, SUSPENSION INTRA-ARTICULAR; INTRALESIONAL; INTRAMUSCULAR; SOFT TISSUE at 10:22

## 2025-05-22 NOTE — PROGRESS NOTES
"    Office Note     Name: Thomas Briceño    : 1955     MRN: [id number]     Chief Complaint  Injections of the Left Shoulder (Last injection 25.)    Subjective     History of Present Illness:  Thomas Briceño is a 70 y.o. male here today for injection therapy.    Review of Systems     Past Medical History:   Diagnosis Date    Acid reflux     Anxiety     Arthritis     Chronic kidney disease, stage 3 (moderate)     Depression     Diabetes     Exertional shortness of breath 2018    Family history of malignant hyperthermia     pt states his grandaughter had it - when asked if her body temperature was critically elevated, pt answered yes, 24    Gout     Hyperlipidemia     Hypertension     Hypothyroidism     Sleep apnea     uses CPAP sometimes         Past Surgical History:   Procedure Laterality Date    CATARACT EXTRACTION W/ INTRAOCULAR LENS IMPLANT Right 2024    Procedure: CATARACT PHACO EXTRACTION WITH INTRAOCULAR LENS IMPLANT RIGHT;  Surgeon: Justin Gao MD;  Location: [facility];  Service: Ophthalmology;  Laterality: Right;    CATARACT EXTRACTION W/ INTRAOCULAR LENS IMPLANT Left 10/10/2024    Procedure: CATARACT PHACO EXTRACTION WITH INTRAOCULAR LENS IMPLANT LEFT;  Surgeon: Justin Gao MD;  Location: [facility];  Service: Ophthalmology;  Laterality: Left;    NO PAST SURGERIES         Allergies   Allergen Reactions    Atenolol Other (See Comments)     dizzy    Meloxicam Dizziness     Dizzy, unable to focus         Objective   /88   Ht 182.9 cm (72.01\")   Wt (!) 153 kg (337 lb 6.4 oz)   BMI 45.75 kg/m²    Physical Exam  Ortho Exam    Skin exam stable with no erythema, ecchymosis or rash.  No new swelling.  No motor or sensory changes.  Distal pulse intact.    Large Joint Arthrocentesis: L subacromial bursa  Date/Time: 2025 10:22 AM  Consent given by: patient  Site marked: site marked  Timeout: Immediately prior to procedure a time out was called to verify the correct " patient, procedure, equipment, support staff and site/side marked as required   Supporting Documentation  Indications: pain   Procedure Details  Location: shoulder - L subacromial bursa  Preparation: Patient was prepped and draped in the usual sterile fashion  Needle size: 23 G  Medications administered: 40 mg methylPREDNISolone acetate 40 MG/ML; 2 mL lidocaine 2%  Patient tolerance: patient tolerated the procedure well with no immediate complications        Assessment and Plan      Diagnosis Plan   1. Subacromial impingement of left shoulder            Discussion of orthopaedic goals and activities and patient expressed appreciation.  Regular exercise as tolerated  Ice, heat, and/or modalities as beneficial  Watch for signs and symptoms of infection  Call or notify for any adverse effect from injection therapy    Recommendations:  Usual activities, routine exercise as tolerated, light physical work as tolerated, no strenuous activity.    Return in about 3 months (around 8/22/2025) for Repeat Injection.  Patient agreeable to call or return sooner for any concerns.

## 2025-06-09 ENCOUNTER — HOSPITAL ENCOUNTER (OUTPATIENT)
Facility: HOSPITAL | Age: 70
Discharge: HOME | End: 2025-06-09
Payer: MEDICARE

## 2025-06-09 DIAGNOSIS — N18.31 STAGE 3A CHRONIC KIDNEY DISEASE (HCC): ICD-10-CM

## 2025-06-09 DIAGNOSIS — Z79.4 TYPE 2 DIABETES MELLITUS WITH STAGE 3A CHRONIC KIDNEY DISEASE, WITH LONG-TERM CURRENT USE OF INSULIN (HCC): ICD-10-CM

## 2025-06-09 DIAGNOSIS — N18.31 TYPE 2 DIABETES MELLITUS WITH STAGE 3A CHRONIC KIDNEY DISEASE, WITH LONG-TERM CURRENT USE OF INSULIN (HCC): ICD-10-CM

## 2025-06-09 DIAGNOSIS — E03.9 HYPOTHYROIDISM, UNSPECIFIED TYPE: ICD-10-CM

## 2025-06-09 DIAGNOSIS — E78.5 HYPERLIPIDEMIA, UNSPECIFIED HYPERLIPIDEMIA TYPE: ICD-10-CM

## 2025-06-09 DIAGNOSIS — I10 PRIMARY HYPERTENSION: ICD-10-CM

## 2025-06-09 DIAGNOSIS — E11.22 TYPE 2 DIABETES MELLITUS WITH STAGE 3A CHRONIC KIDNEY DISEASE, WITH LONG-TERM CURRENT USE OF INSULIN (HCC): ICD-10-CM

## 2025-06-09 LAB
ALBUMIN SERPL-MCNC: 4.2 G/DL (ref 3.4–4.8)
ALBUMIN/GLOB SERPL: 1.4 {RATIO} (ref 0.8–2)
ALP SERPL-CCNC: 80 U/L (ref 25–100)
ALT SERPL-CCNC: 20 U/L (ref 4–36)
ANION GAP SERPL CALCULATED.3IONS-SCNC: 12 MMOL/L (ref 3–16)
AST SERPL-CCNC: 19 U/L (ref 8–33)
BASOPHILS # BLD: 0.1 K/UL (ref 0–0.1)
BASOPHILS NFR BLD: 0.9 %
BILIRUB SERPL-MCNC: 0.4 MG/DL (ref 0.3–1.2)
BUN SERPL-MCNC: 25 MG/DL (ref 6–20)
CALCIUM SERPL-MCNC: 9.4 MG/DL (ref 8.3–10.6)
CHLORIDE SERPL-SCNC: 104 MMOL/L (ref 98–107)
CO2 SERPL-SCNC: 22 MMOL/L (ref 20–30)
CREAT SERPL-MCNC: 1.4 MG/DL (ref 0.8–1.3)
EOSINOPHIL # BLD: 0.9 K/UL (ref 0–0.4)
EOSINOPHIL NFR BLD: 8.7 %
ERYTHROCYTE [DISTWIDTH] IN BLOOD BY AUTOMATED COUNT: 12.1 % (ref 11–16)
GFR SERPLBLD CREATININE-BSD FMLA CKD-EPI: 53 ML/MIN/{1.73_M2}
GLOBULIN SER CALC-MCNC: 3.1 G/DL
GLUCOSE SERPL-MCNC: 135 MG/DL (ref 74–106)
HBA1C MFR BLD: 6.8 %
HCT VFR BLD AUTO: 42.3 % (ref 40–54)
HGB BLD-MCNC: 13.5 G/DL (ref 13–18)
IMM GRANULOCYTES # BLD: 0.1 K/UL
IMM GRANULOCYTES NFR BLD: 0.5 % (ref 0–5)
LYMPHOCYTES # BLD: 2.4 K/UL (ref 1.5–4)
LYMPHOCYTES NFR BLD: 22 %
MCH RBC QN AUTO: 30.4 PG (ref 27–32)
MCHC RBC AUTO-ENTMCNC: 31.9 G/DL (ref 31–35)
MCV RBC AUTO: 95.3 FL (ref 80–100)
MONOCYTES # BLD: 0.6 K/UL (ref 0.2–0.8)
MONOCYTES NFR BLD: 5.7 %
NEUTROPHILS # BLD: 6.7 K/UL (ref 2–7.5)
NEUTS SEG NFR BLD: 62.2 %
PLATELET # BLD AUTO: 229 K/UL (ref 150–400)
PMV BLD AUTO: 11.6 FL (ref 6–10)
POTASSIUM SERPL-SCNC: 5.2 MMOL/L (ref 3.4–5.1)
PROT SERPL-MCNC: 7.3 G/DL (ref 6.4–8.3)
RBC # BLD AUTO: 4.44 M/UL (ref 4.5–6)
SODIUM SERPL-SCNC: 138 MMOL/L (ref 136–145)
TSH SERPL DL<=0.005 MIU/L-ACNC: 2.49 UIU/ML (ref 0.27–4.2)
WBC # BLD AUTO: 10.8 K/UL (ref 4–11)

## 2025-06-09 PROCEDURE — 80053 COMPREHEN METABOLIC PANEL: CPT

## 2025-06-09 PROCEDURE — 36415 COLL VENOUS BLD VENIPUNCTURE: CPT

## 2025-06-09 PROCEDURE — 84443 ASSAY THYROID STIM HORMONE: CPT

## 2025-06-09 PROCEDURE — 85025 COMPLETE CBC W/AUTO DIFF WBC: CPT

## 2025-06-09 PROCEDURE — 83036 HEMOGLOBIN GLYCOSYLATED A1C: CPT

## 2025-06-10 ENCOUNTER — RESULTS FOLLOW-UP (OUTPATIENT)
Dept: INTERNAL MEDICINE CLINIC | Facility: HOSPITAL | Age: 70
End: 2025-06-10

## 2025-06-24 ENCOUNTER — OFFICE VISIT (OUTPATIENT)
Age: 70
End: 2025-06-24
Payer: MEDICARE

## 2025-06-24 VITALS
WEIGHT: 315 LBS | HEART RATE: 78 BPM | DIASTOLIC BLOOD PRESSURE: 71 MMHG | SYSTOLIC BLOOD PRESSURE: 109 MMHG | RESPIRATION RATE: 18 BRPM | OXYGEN SATURATION: 90 % | BODY MASS INDEX: 44.84 KG/M2

## 2025-06-24 DIAGNOSIS — N18.31 TYPE 2 DIABETES MELLITUS WITH STAGE 3A CHRONIC KIDNEY DISEASE, WITH LONG-TERM CURRENT USE OF INSULIN (HCC): Primary | ICD-10-CM

## 2025-06-24 DIAGNOSIS — I10 PRIMARY HYPERTENSION: ICD-10-CM

## 2025-06-24 DIAGNOSIS — E03.9 HYPOTHYROIDISM, UNSPECIFIED TYPE: ICD-10-CM

## 2025-06-24 DIAGNOSIS — E11.22 TYPE 2 DIABETES MELLITUS WITH STAGE 3A CHRONIC KIDNEY DISEASE, WITH LONG-TERM CURRENT USE OF INSULIN (HCC): Primary | ICD-10-CM

## 2025-06-24 DIAGNOSIS — Z79.4 TYPE 2 DIABETES MELLITUS WITH STAGE 3A CHRONIC KIDNEY DISEASE, WITH LONG-TERM CURRENT USE OF INSULIN (HCC): Primary | ICD-10-CM

## 2025-06-24 DIAGNOSIS — E66.813 CLASS 3 SEVERE OBESITY WITH SERIOUS COMORBIDITY AND BODY MASS INDEX (BMI) OF 40.0 TO 44.9 IN ADULT, UNSPECIFIED OBESITY TYPE (HCC): ICD-10-CM

## 2025-06-24 PROCEDURE — 3017F COLORECTAL CA SCREEN DOC REV: CPT | Performed by: INTERNAL MEDICINE

## 2025-06-24 PROCEDURE — 3074F SYST BP LT 130 MM HG: CPT | Performed by: INTERNAL MEDICINE

## 2025-06-24 PROCEDURE — 3078F DIAST BP <80 MM HG: CPT | Performed by: INTERNAL MEDICINE

## 2025-06-24 PROCEDURE — 1123F ACP DISCUSS/DSCN MKR DOCD: CPT | Performed by: INTERNAL MEDICINE

## 2025-06-24 PROCEDURE — 1159F MED LIST DOCD IN RCRD: CPT | Performed by: INTERNAL MEDICINE

## 2025-06-24 PROCEDURE — G8417 CALC BMI ABV UP PARAM F/U: HCPCS | Performed by: INTERNAL MEDICINE

## 2025-06-24 PROCEDURE — 3044F HG A1C LEVEL LT 7.0%: CPT | Performed by: INTERNAL MEDICINE

## 2025-06-24 PROCEDURE — 99214 OFFICE O/P EST MOD 30 MIN: CPT | Performed by: INTERNAL MEDICINE

## 2025-06-24 PROCEDURE — G8427 DOCREV CUR MEDS BY ELIG CLIN: HCPCS | Performed by: INTERNAL MEDICINE

## 2025-06-24 PROCEDURE — 1036F TOBACCO NON-USER: CPT | Performed by: INTERNAL MEDICINE

## 2025-06-24 PROCEDURE — 2022F DILAT RTA XM EVC RTNOPTHY: CPT | Performed by: INTERNAL MEDICINE

## 2025-06-24 PROCEDURE — 1160F RVW MEDS BY RX/DR IN RCRD: CPT | Performed by: INTERNAL MEDICINE

## 2025-06-24 RX ORDER — CYCLOBENZAPRINE HCL 10 MG
10 TABLET ORAL 3 TIMES DAILY
COMMUNITY
Start: 2025-05-02

## 2025-06-24 RX ORDER — GLY/DIMETH/PETROLAT,WHT/WATER
CREAM (GRAM) TOPICAL
Qty: 453 G | Refills: 1 | Status: SHIPPED | OUTPATIENT
Start: 2025-06-24

## 2025-06-24 RX ORDER — CLOTRIMAZOLE AND BETAMETHASONE DIPROPIONATE 10; .64 MG/G; MG/G
CREAM TOPICAL
Qty: 45 G | Refills: 1 | Status: SHIPPED | OUTPATIENT
Start: 2025-06-24

## 2025-06-24 ASSESSMENT — ENCOUNTER SYMPTOMS
NAUSEA: 0
SINUS PRESSURE: 0
BACK PAIN: 0
VOMITING: 0
EYE DISCHARGE: 0
COUGH: 0
ABDOMINAL PAIN: 0
ROS SKIN COMMENTS: AS IN HPI
SHORTNESS OF BREATH: 0
WHEEZING: 0

## 2025-06-24 NOTE — PROGRESS NOTES
SUBJECTIVE:    Patient ID: Alfredo Perez is a 70 y.o.male.    Chief Complaint   Patient presents with    Diabetes     Needs foot exam          HPI:  Patient has had diabetes for the past few several years.  He has been compliant with the medications and denies any side effects from it. He has been monitoring fingersticks on a daily basis.  His fingerstick range is between . He has had some hypoglycemic symptoms. He has  been following a diabetic diet and has  been active.  His last eye exam was  than a year ago.  He is using oral meds and Insulin.He takes Lantus 25 units nightly. He is also on Ozempic 2mg weekly .    Patient reported significant dryness to his feet.  This been going on for a long time and getting worse.  She reported prior history of small ulceration/cracking at the time.  Patient reported ongoing numbness to his feet.    Patient's medications, allergies, past medical, surgical, social and family histories were reviewed and updated as appropriate in the electronic medical record/chart.        Outpatient Medications Marked as Taking for the 6/24/25 encounter (Office Visit) with Starr Morrison MD   Medication Sig Dispense Refill    cyclobenzaprine (FLEXERIL) 10 MG tablet Take 1 tablet by mouth 3 times daily      cloNIDine (CATAPRES) 0.1 MG tablet TAKE ONE TABLET BY MOUTH TWICE A DAY AS NEEDED FOR HIGH BLOOD PRESSURE (TAKE IF TOP NUMBER  OR GREATER) 180 tablet 0    insulin glargine (LANTUS SOLOSTAR) 100 UNIT/ML injection pen Inject 45 Units into the skin nightly 90 mL 3    Insulin Pen Needle (UNIFINE PENTIPS) 31G X 6 MM MISC USE TO INJECT INSULIN EVERY  each 5    diclofenac sodium (VOLTAREN) 1 % GEL Apply topically 2 times daily 150 g 1    lisinopril (PRINIVIL;ZESTRIL) 20 MG tablet TAKE 1 TABLET EVERY DAY 90 tablet 1    omeprazole (PRILOSEC) 40 MG delayed release capsule TAKE 1 CAPSULE EVERY DAY FOR STOMACH 90 capsule 1    allopurinol (ZYLOPRIM) 100 MG tablet TAKE ONE TABLET BY

## 2025-07-11 DIAGNOSIS — I10 PRIMARY HYPERTENSION: ICD-10-CM

## 2025-07-11 RX ORDER — FOLIC ACID 1 MG/1
1000 TABLET ORAL DAILY
Qty: 90 TABLET | Refills: 1 | Status: SHIPPED | OUTPATIENT
Start: 2025-07-11

## 2025-07-11 RX ORDER — LISINOPRIL 20 MG/1
20 TABLET ORAL DAILY
Qty: 90 TABLET | Refills: 3 | Status: SHIPPED | OUTPATIENT
Start: 2025-07-11

## 2025-07-25 ENCOUNTER — HOSPITAL ENCOUNTER (OUTPATIENT)
Dept: GENERAL RADIOLOGY | Facility: HOSPITAL | Age: 70
Discharge: HOME | End: 2025-07-25
Payer: MEDICARE

## 2025-07-25 ENCOUNTER — HOSPITAL ENCOUNTER (OUTPATIENT)
Facility: HOSPITAL | Age: 70
Discharge: HOME | End: 2025-07-25
Payer: MEDICARE

## 2025-07-25 DIAGNOSIS — M25.562 PAIN IN JOINT OF LEFT KNEE: ICD-10-CM

## 2025-07-25 PROCEDURE — 73562 X-RAY EXAM OF KNEE 3: CPT

## 2025-08-23 DIAGNOSIS — K21.9 GASTROESOPHAGEAL REFLUX DISEASE, UNSPECIFIED WHETHER ESOPHAGITIS PRESENT: Chronic | ICD-10-CM

## 2025-08-25 RX ORDER — OMEPRAZOLE 40 MG/1
CAPSULE, DELAYED RELEASE ORAL
Qty: 90 CAPSULE | Refills: 1 | Status: SHIPPED | OUTPATIENT
Start: 2025-08-25

## (undated) DEVICE — BLD BEAVER MICROSHARP 15D 3MM BLU LF

## (undated) DEVICE — CANN IRR/INJ AIR ANT CHAMBER 6MM BEND 27G

## (undated) DEVICE — Device

## (undated) DEVICE — HP CONCL INTREPID COAX I/A CRV .3MM

## (undated) DEVICE — GLV SURG SENSICARE PI LF PF 8 GRN STRL

## (undated) DEVICE — SOL IRRIG H2O 1000ML STRL

## (undated) DEVICE — THE MONARCH® "D" CARTRIDGE IS A SINGLE-USE POLYPROPYLENE CARTRIDGE FOR POSTERIOR CHAMBER IOL DELIVERY: Brand: MONARCH® III

## (undated) DEVICE — CLEARCUT® HP2 SLIT KNIFE INTREPID MICRO-COAXIAL SYSTEM 2.4 DB: Brand: CLEARCUT®; INTREPID

## (undated) DEVICE — SYR LUERLOK 5CC